# Patient Record
Sex: FEMALE | Race: WHITE | Employment: OTHER | URBAN - METROPOLITAN AREA
[De-identification: names, ages, dates, MRNs, and addresses within clinical notes are randomized per-mention and may not be internally consistent; named-entity substitution may affect disease eponyms.]

---

## 2015-11-25 LAB — PAP SMEAR - HIM PATIENT REPORTED: NEGATIVE

## 2017-01-18 ENCOUNTER — TRANSFERRED RECORDS (OUTPATIENT)
Dept: HEALTH INFORMATION MANAGEMENT | Facility: CLINIC | Age: 62
End: 2017-01-18

## 2017-01-18 LAB — PAP SMEAR - HIM PATIENT REPORTED: NEGATIVE

## 2017-12-08 ENCOUNTER — TRANSFERRED RECORDS (OUTPATIENT)
Dept: HEALTH INFORMATION MANAGEMENT | Facility: CLINIC | Age: 62
End: 2017-12-08

## 2018-04-16 ENCOUNTER — TRANSFERRED RECORDS (OUTPATIENT)
Dept: HEALTH INFORMATION MANAGEMENT | Facility: CLINIC | Age: 63
End: 2018-04-16

## 2018-04-16 LAB — PAP SMEAR - HIM PATIENT REPORTED: NEGATIVE

## 2018-06-18 ENCOUNTER — TRANSFERRED RECORDS (OUTPATIENT)
Dept: HEALTH INFORMATION MANAGEMENT | Facility: CLINIC | Age: 63
End: 2018-06-18

## 2018-07-23 ENCOUNTER — OFFICE VISIT (OUTPATIENT)
Dept: PEDIATRICS | Facility: CLINIC | Age: 63
End: 2018-07-23
Payer: COMMERCIAL

## 2018-07-23 VITALS
SYSTOLIC BLOOD PRESSURE: 124 MMHG | WEIGHT: 175 LBS | HEIGHT: 63 IN | BODY MASS INDEX: 31.01 KG/M2 | TEMPERATURE: 98.4 F | HEART RATE: 61 BPM | OXYGEN SATURATION: 97 % | DIASTOLIC BLOOD PRESSURE: 86 MMHG

## 2018-07-23 DIAGNOSIS — M54.41 ACUTE MIDLINE LOW BACK PAIN WITH RIGHT-SIDED SCIATICA: Primary | ICD-10-CM

## 2018-07-23 PROCEDURE — 99213 OFFICE O/P EST LOW 20 MIN: CPT | Performed by: PHYSICIAN ASSISTANT

## 2018-07-23 RX ORDER — CYCLOBENZAPRINE HCL 10 MG
10 TABLET ORAL 3 TIMES DAILY PRN
Qty: 15 TABLET | Refills: 0 | Status: SHIPPED | OUTPATIENT
Start: 2018-07-23 | End: 2019-02-18

## 2018-07-23 NOTE — PATIENT INSTRUCTIONS
Low Back Pain            What is low back pain?   Low back pain is pain and stiffness in the lower back. It is one of the most common reasons people miss work.   How does it occur?   Your lower back is called your lumbar spine. It is made up of 5 bones called lumbar vertebrae. In between the vertebrae are shock absorbers called disks. Back pain can occur from an injury to the vertebrae or when a disk bulges or herniates.   Low back pain is usually caused when a ligament or muscle holding a vertebra in its proper position is strained. Vertebrae are bones that make up the spinal column through which the spinal cord passes. When these muscles or ligaments become weak or strained, the spine loses its stability, resulting in pain.   Low back pain can occur if your job involves lifting and carrying heavy objects, or if you spend a lot of time sitting or standing in one position or bending over. It can be caused by a fall or by unusually strenuous exercise. It can be brought on by the tension and stress that cause headaches in some people. It can even be brought on by violent sneezing or coughing.   People who are overweight may have low back pain because of the added stress on their back.   Back pain may occur when the muscles, joints, bones, and connective tissues of the back become inflamed as a result of an infection or an immune system problem. Arthritic disorders as well as some congenital and degenerative conditions may cause back pain.   Back pain accompanied by loss of bladder or bowel control, trouble moving your legs, or numbness or tingling in your arms or legs requires immediate medical treatment.   What are the symptoms?   Symptoms include:   pain in the back or legs   stiffness, spasm, or limited motion   The pain may be constant or may happen only in certain positions. It may get worse when you cough, sneeze, bend, twist, or strain during a bowel movement. The pain may be in only one spot or may  spread to other areas, most commonly down the buttocks and into the back of the thigh.   A low back strain typically does not produce pain past the knee into the calf or foot. Tingling or numbness in the calf or foot may indicate a herniated disk or pinched nerve.   Be sure to see your healthcare provider if:   You have weakness in your leg, especially if you cannot lift your foot, because this may be a sign of nerve damage.   You have new bowel or bladder problems as well as back pain, which may be a sign of severe injury to your spinal cord.   You have pain that gets worse despite treatment.   How is it diagnosed?   Your healthcare provider will review your medical history and examine you. You may have X-rays, an MRI, CT scan, or a bone scan.   How is it treated?   To treat this condition:   Put an ice pack, gel pack, or package of frozen vegetables, wrapped in a cloth on the area every 3 to 4 hours, for up to 20 minutes at a time for the first 2 or 3 days.   Use a heating pad or hot water bottle. Don't let the heating pad get too hot, and don't fall asleep with it. You could get a burn.   Rest in bed on a firm mattress. Often it helps to lie on your back with your knees raised on a pillow. However, some people prefer to lie on their side with their knees bent. It's best to try to stay active, so try not to rest in bed longer than 1 to 2 days.   Take muscle relaxants as recommended by your healthcare provider.   Take an anti-inflammatory such as ibuprofen, or other medicine as directed by your provider. Nonsteroidal anti-inflammatory medicines (NSAIDs) may cause stomach bleeding and other problems. These risks increase with age. Read the label and take as directed. Unless recommended by your healthcare provider, do not take for more than 10 days.   Get a back massage by a trained person.   Wear a belt or corset to support your back.   Do the exercises recommended by your provider. Your provider may also prescribe  physical therapy.   Talk with a counselor, if your back pain is related to tension caused by emotional problems.   When the pain is gone, ask your healthcare provider about starting an exercise program such as the following:   Exercise moderately every day, using stretching and warm-up exercises suggested by your provider or physical therapist.   Exercise vigorously for about 30 minutes 3 times a week by walking, swimming, using a stationary bicycle, or doing low-impact aerobics.   Exercising regularly will not only help your back, it will also help keep you healthier overall.   How long will the effects last?   The effects of back pain last as long as the cause exists or until your body recovers from the strain, usually a day or two but sometimes weeks.   How can I take care of myself?   In addition to the treatment described above, keep in mind these suggestions:   Practice good posture. Stand with your head up, shoulders straight, chest forward, weight balanced evenly on both feet, and pelvis tucked in.   Lose weight if you are overweight   Keep your core muscles strong. These are your abdominal and back muscles.   Sleep without a pillow under your head.   Pain is the best way to  the pace you should set in increasing your activity and exercise. Minor discomfort, stiffness, soreness, and mild aches need not interfere with activity. However, limit your activities temporarily if:   Your symptoms return.   The pain increases when you are more active.   The pain increases within 24 hours after a new or higher level of activity.   When can I return to my normal activities?   Everyone recovers from an injury at a different rate. Return to your activities depends on how soon your back recovers, not by how many days or weeks it has been since your injury has occurred. In general, the longer you have symptoms before you start treatment, the longer it will take to get better. The goal is to return to your normal  activities as soon as is safely possible. If you return too soon you may worsen your injury.   It is important that you have fully recovered from your low back pain before you return to any strenuous activity. You must be able to have the same range of motion that you had before your injury. You must be able to walk and twist without pain.   What can I do to help prevent low back pain?   You can reduce the strain on your back by doing the following:   Don't push with your arms when you move a heavy object. Turn around and push backwards so the strain is taken by your legs.   Whenever you sit, sit in a straight-backed chair and hold your spine against the back of the chair.   Bend your knees and hips and keep your back straight when you lift a heavy object.   Avoid lifting heavy objects higher than your waist.   Hold packages you carry close to your body, with your arms bent.   Use a footrest for one foot when you stand or sit in one spot for a long time. This keeps your back straight.   Bend your knees when you bend over.   Sit close to the pedals when you drive and use your seat belt and a hard backrest or pillow.   Lie on your side with your knees bent when you sleep or rest. It may help to put a pillow between your knees.   Put a pillow under your knees when you sleep on your back.   Raise the foot of the bed 8 inches to discourage sleeping on your stomach unless you have other problems that require that you keep your head elevated.   To rest your back, hold each of these positions for 5?minutes or longer:   Lie on your back, bend your knees, and put pillows under your knees.   Lie on your back on the floor with a pillow under your neck. Bend your knees to a 90-degree angle, and put your lower legs and feet on a chair.   Lie on your back, bend your knees, and bring one knee up to your chest and hold it there. Repeat with the other knee, then bring both knees to your chest. When holding your knee to your chest,  grab your thigh rather than your lower leg to avoid over flexing your knee.     Published by [employer].  This content is reviewed periodically and is subject to change as new health information becomes available. The information is intended to inform and educate and is not a replacement for medical evaluation, advice, diagnosis or treatment by a healthcare professional.   Developed by Yolanda Alexis RN, [place], and [facility].   ? 2010 [facility] and/or its affiliates. All Rights Reserved.           Low Back Pain Exercise          Standing hamstring stretch: Put the heel of one leg on a stool about 15 inches high. Keep your leg straight. Lean forward, bending at the hips until you feel a mild stretch in the back of your thigh. Make sure you do not roll your shoulders or bend at the waist when doing this. You want to stretch your leg, not your lower back. Hold the stretch for 15 to 30 seconds. Repeat with each leg 3 times.   Cat and camel: Get down on your hands and knees. Let your stomach sag, allowing your back to curve downward. Hold this position for 5 seconds. Then arch your back and hold for 5 seconds. Do 3 sets of 10.   Quadruped arm and leg raise: Get down on your hands and knees. Pull in your belly button and tighten your abdominal muscles to stiffen your spine. While keeping your abdominals tight, raise one arm and the opposite leg away from you. Hold this position for 5 seconds. Lower your arm and leg slowly and change sides. Do this 10 times on each side.   Pelvic tilt: Lie on your back with your knees bent and your feet flat on the floor. Tighten your abdominal muscles and push your lower back into the floor. Hold this position for 5 seconds, then relax. Do 3 sets of 10.   Partial curl: Lie on your back with your knees bent and your feet flat on the floor. Tighten your stomach muscles. Tuck your chin to your chest. With your hands stretched out in front of you, curl your upper body forward until your  shoulders clear the floor. Hold this position for 3 seconds. Don't hold your breath. It helps to breathe out as you lift your shoulders up. Relax back to the floor. Repeat 10 times. Build to 3 sets of 10. To challenge yourself, clasp your hands behind your head and keep your elbows out to the side.   Gluteal stretch: Lie on your back with both knees bent. Rest the ankle of one leg over the knee of your other leg. Grasp the thigh of the bottom leg and pull toward your chest. You will feel a stretch along the buttocks and possibly along the outside of your hip. Hold the stretch for 15 to 30 seconds. Repeat 3 times with each leg.   Extension exercise:   0. Lie face down on the floor for 5 minutes. If this hurts too much, lie face down with a pillow under your stomach. This should relieve your leg or back pain. When you can lie on your stomach for 5 minutes without a pillow, you can continue with Part B of this exercise.   0. After lying on your stomach for 5 minutes, prop yourself up on your elbows for another 5 minutes. If you can do this without having more leg or buttock pain, you can start doing part C of this exercise.   0. Lie on your stomach with your hands under your shoulders. Then press down on your hands and extend your elbows while keeping your hips flat on the floor. Hold for 1 second and lower yourself to the floor. Do 3 to 5 sets of 10 repetitions. Rest for 1 minute between sets. You should have no pain in your legs when you do this, but it is normal to feel some pain in your lower back.   Do this exercise several times a day.   Side plank: Lie on your side with your legs, hips, and shoulders in a straight line. Prop yourself up onto your forearm so your elbow is directly under your shoulder. Lift your hips off the floor and balance on your forearm and the outside of your foot. Try to hold this position for 15 seconds, then slowly lower your hip to the ground. Switch sides and repeat. Work up to holding  for 1 minute or longer. This exercise can be made easier by starting with your knees and hips flexed toward your chest.   Published by [employer].  This content is reviewed periodically and is subject to change as new health information becomes available. The information is intended to inform and educate and is not a replacement for medical evaluation, advice, diagnosis or treatment by a healthcare professional.   Written by Romana Augustin, MS, PT, and Yolanda Bui PT, [facility], [facility], for [facility]   ? 2010 [facility] and/or its affiliates. All Rights Reserved.         Copyright   [employer] 2011

## 2018-07-23 NOTE — PROGRESS NOTES
"  SUBJECTIVE:   Marivel Hernandez is a 63 year old female who presents to clinic today for the following health issues:    Back Pain     Duration: 1 day ago        Specific cause: turning/bending    Description:   Location of pain: low back right  Character of pain: sharp, dull ache and constant  Pain radiation:radiates into the right leg  New numbness or weakness in legs, not attributed to pain:  no     Intensity: Currently 5/10, At its worst 9/10    History:   Pain interferes with job: YES  History of back problems: no prior back problems  Any previous MRI or X-rays: None  Sees a specialist for back pain:  No  Therapies tried without relief: ibuprofen, stretching and cold    Alleviating factors:   Improved by: rest      Precipitating factors:  Worsened by: any type of movement    Accompanying Signs & Symptoms:  Risk of Fracture:  None  Risk of Cauda Equina:  None  Risk of Infection:  None  Risk of Cancer:  None  Risk of Ankylosing Spondylitis:  Onset at age <35, male, AND morning back stiffness. no     ROS:  ROS otherwise negative    OBJECTIVE:                                                    /86 (BP Location: Right arm, Cuff Size: Adult Regular)  Pulse 61  Temp 98.4  F (36.9  C) (Oral)  Ht 5' 3\" (1.6 m)  Wt 175 lb (79.4 kg)  SpO2 97%  BMI 31 kg/m2  Body mass index is 31 kg/(m^2).   GENERAL: healthy, alert, well nourished, well hydrated, no distress  Lumber/Thoracic Spine Exam: Tender:  lumbar spinous processes, right para lumbar muscles  Range of Motion:  lumbar flexion  decreased, painful, left lateral lumbar bending  decreased, painful, right lateral lumbar bending  full  Strength: full  Special tests:  Unable to test SLR due to pain    Diagnostic test results:  No results found for this or any previous visit (from the past 24 hour(s)).     ASSESSMENT/PLAN:                                                    (M54.41) Acute midline low back pain with right-sided sciatica  (primary encounter " diagnosis)  Comment: begin heat, NSAIDs and flexeril PRN. Gentle stretching.   Plan: cyclobenzaprine (FLEXERIL) 10 MG tablet          See Patient Instructions    Arabella Butcher PA-C  [facility]

## 2018-07-23 NOTE — MR AVS SNAPSHOT
After Visit Summary   7/23/2018    Marivel Hernandez    MRN: [id number]           Patient Information     Date Of Birth          1955        Visit Information        Provider Department      7/23/2018 10:50 AM Arabella Butcher PA-C [facility]        Today's Diagnoses     Acute midline low back pain with right-sided sciatica    -  1      Care Instructions             Low Back Pain            What is low back pain?   Low back pain is pain and stiffness in the lower back. It is one of the most common reasons people miss work.   How does it occur?   Your lower back is called your lumbar spine. It is made up of 5 bones called lumbar vertebrae. In between the vertebrae are shock absorbers called disks. Back pain can occur from an injury to the vertebrae or when a disk bulges or herniates.   Low back pain is usually caused when a ligament or muscle holding a vertebra in its proper position is strained. Vertebrae are bones that make up the spinal column through which the spinal cord passes. When these muscles or ligaments become weak or strained, the spine loses its stability, resulting in pain.   Low back pain can occur if your job involves lifting and carrying heavy objects, or if you spend a lot of time sitting or standing in one position or bending over. It can be caused by a fall or by unusually strenuous exercise. It can be brought on by the tension and stress that cause headaches in some people. It can even be brought on by violent sneezing or coughing.   People who are overweight may have low back pain because of the added stress on their back.   Back pain may occur when the muscles, joints, bones, and connective tissues of the back become inflamed as a result of an infection or an immune system problem. Arthritic disorders as well as some congenital and degenerative conditions may cause back pain.   Back pain accompanied by loss of bladder or bowel control, trouble moving  your legs, or numbness or tingling in your arms or legs requires immediate medical treatment.   What are the symptoms?   Symptoms include:   pain in the back or legs   stiffness, spasm, or limited motion   The pain may be constant or may happen only in certain positions. It may get worse when you cough, sneeze, bend, twist, or strain during a bowel movement. The pain may be in only one spot or may spread to other areas, most commonly down the buttocks and into the back of the thigh.   A low back strain typically does not produce pain past the knee into the calf or foot. Tingling or numbness in the calf or foot may indicate a herniated disk or pinched nerve.   Be sure to see your healthcare provider if:   You have weakness in your leg, especially if you cannot lift your foot, because this may be a sign of nerve damage.   You have new bowel or bladder problems as well as back pain, which may be a sign of severe injury to your spinal cord.   You have pain that gets worse despite treatment.   How is it diagnosed?   Your healthcare provider will review your medical history and examine you. You may have X-rays, an MRI, CT scan, or a bone scan.   How is it treated?   To treat this condition:   Put an ice pack, gel pack, or package of frozen vegetables, wrapped in a cloth on the area every 3 to 4 hours, for up to 20 minutes at a time for the first 2 or 3 days.   Use a heating pad or hot water bottle. Don't let the heating pad get too hot, and don't fall asleep with it. You could get a burn.   Rest in bed on a firm mattress. Often it helps to lie on your back with your knees raised on a pillow. However, some people prefer to lie on their side with their knees bent. It's best to try to stay active, so try not to rest in bed longer than 1 to 2 days.   Take muscle relaxants as recommended by your healthcare provider.   Take an anti-inflammatory such as ibuprofen, or other medicine as directed by your provider. Nonsteroidal  anti-inflammatory medicines (NSAIDs) may cause stomach bleeding and other problems. These risks increase with age. Read the label and take as directed. Unless recommended by your healthcare provider, do not take for more than 10 days.   Get a back massage by a trained person.   Wear a belt or corset to support your back.   Do the exercises recommended by your provider. Your provider may also prescribe physical therapy.   Talk with a counselor, if your back pain is related to tension caused by emotional problems.   When the pain is gone, ask your healthcare provider about starting an exercise program such as the following:   Exercise moderately every day, using stretching and warm-up exercises suggested by your provider or physical therapist.   Exercise vigorously for about 30 minutes 3 times a week by walking, swimming, using a stationary bicycle, or doing low-impact aerobics.   Exercising regularly will not only help your back, it will also help keep you healthier overall.   How long will the effects last?   The effects of back pain last as long as the cause exists or until your body recovers from the strain, usually a day or two but sometimes weeks.   How can I take care of myself?   In addition to the treatment described above, keep in mind these suggestions:   Practice good posture. Stand with your head up, shoulders straight, chest forward, weight balanced evenly on both feet, and pelvis tucked in.   Lose weight if you are overweight   Keep your core muscles strong. These are your abdominal and back muscles.   Sleep without a pillow under your head.   Pain is the best way to  the pace you should set in increasing your activity and exercise. Minor discomfort, stiffness, soreness, and mild aches need not interfere with activity. However, limit your activities temporarily if:   Your symptoms return.   The pain increases when you are more active.   The pain increases within 24 hours after a new or higher level  of activity.   When can I return to my normal activities?   Everyone recovers from an injury at a different rate. Return to your activities depends on how soon your back recovers, not by how many days or weeks it has been since your injury has occurred. In general, the longer you have symptoms before you start treatment, the longer it will take to get better. The goal is to return to your normal activities as soon as is safely possible. If you return too soon you may worsen your injury.   It is important that you have fully recovered from your low back pain before you return to any strenuous activity. You must be able to have the same range of motion that you had before your injury. You must be able to walk and twist without pain.   What can I do to help prevent low back pain?   You can reduce the strain on your back by doing the following:   Don't push with your arms when you move a heavy object. Turn around and push backwards so the strain is taken by your legs.   Whenever you sit, sit in a straight-backed chair and hold your spine against the back of the chair.   Bend your knees and hips and keep your back straight when you lift a heavy object.   Avoid lifting heavy objects higher than your waist.   Hold packages you carry close to your body, with your arms bent.   Use a footrest for one foot when you stand or sit in one spot for a long time. This keeps your back straight.   Bend your knees when you bend over.   Sit close to the pedals when you drive and use your seat belt and a hard backrest or pillow.   Lie on your side with your knees bent when you sleep or rest. It may help to put a pillow between your knees.   Put a pillow under your knees when you sleep on your back.   Raise the foot of the bed 8 inches to discourage sleeping on your stomach unless you have other problems that require that you keep your head elevated.   To rest your back, hold each of these positions for 5?minutes or longer:   Lie on your  back, bend your knees, and put pillows under your knees.   Lie on your back on the floor with a pillow under your neck. Bend your knees to a 90-degree angle, and put your lower legs and feet on a chair.   Lie on your back, bend your knees, and bring one knee up to your chest and hold it there. Repeat with the other knee, then bring both knees to your chest. When holding your knee to your chest, grab your thigh rather than your lower leg to avoid over flexing your knee.     Published by [employer].  This content is reviewed periodically and is subject to change as new health information becomes available. The information is intended to inform and educate and is not a replacement for medical evaluation, advice, diagnosis or treatment by a healthcare professional.   Developed by Yolanda Alexis RN, [place], and [employer].   ? 2010 [facility] and/or its affiliates. All Rights Reserved.           Low Back Pain Exercise          Standing hamstring stretch: Put the heel of one leg on a stool about 15 inches high. Keep your leg straight. Lean forward, bending at the hips until you feel a mild stretch in the back of your thigh. Make sure you do not roll your shoulders or bend at the waist when doing this. You want to stretch your leg, not your lower back. Hold the stretch for 15 to 30 seconds. Repeat with each leg 3 times.   Cat and camel: Get down on your hands and knees. Let your stomach sag, allowing your back to curve downward. Hold this position for 5 seconds. Then arch your back and hold for 5 seconds. Do 3 sets of 10.   Quadruped arm and leg raise: Get down on your hands and knees. Pull in your belly button and tighten your abdominal muscles to stiffen your spine. While keeping your abdominals tight, raise one arm and the opposite leg away from you. Hold this position for 5 seconds. Lower your arm and leg slowly and change sides. Do this 10 times on each side.   Pelvic tilt: Lie on your back with your knees bent and  your feet flat on the floor. Tighten your abdominal muscles and push your lower back into the floor. Hold this position for 5 seconds, then relax. Do 3 sets of 10.   Partial curl: Lie on your back with your knees bent and your feet flat on the floor. Tighten your stomach muscles. Tuck your chin to your chest. With your hands stretched out in front of you, curl your upper body forward until your shoulders clear the floor. Hold this position for 3 seconds. Don't hold your breath. It helps to breathe out as you lift your shoulders up. Relax back to the floor. Repeat 10 times. Build to 3 sets of 10. To challenge yourself, clasp your hands behind your head and keep your elbows out to the side.   Gluteal stretch: Lie on your back with both knees bent. Rest the ankle of one leg over the knee of your other leg. Grasp the thigh of the bottom leg and pull toward your chest. You will feel a stretch along the buttocks and possibly along the outside of your hip. Hold the stretch for 15 to 30 seconds. Repeat 3 times with each leg.   Extension exercise:   0. Lie face down on the floor for 5 minutes. If this hurts too much, lie face down with a pillow under your stomach. This should relieve your leg or back pain. When you can lie on your stomach for 5 minutes without a pillow, you can continue with Part B of this exercise.   0. After lying on your stomach for 5 minutes, prop yourself up on your elbows for another 5 minutes. If you can do this without having more leg or buttock pain, you can start doing part C of this exercise.   0. Lie on your stomach with your hands under your shoulders. Then press down on your hands and extend your elbows while keeping your hips flat on the floor. Hold for 1 second and lower yourself to the floor. Do 3 to 5 sets of 10 repetitions. Rest for 1 minute between sets. You should have no pain in your legs when you do this, but it is normal to feel some pain in your lower back.   Do this exercise several  times a day.   Side plank: Lie on your side with your legs, hips, and shoulders in a straight line. Prop yourself up onto your forearm so your elbow is directly under your shoulder. Lift your hips off the floor and balance on your forearm and the outside of your foot. Try to hold this position for 15 seconds, then slowly lower your hip to the ground. Switch sides and repeat. Work up to holding for 1 minute or longer. This exercise can be made easier by starting with your knees and hips flexed toward your chest.   Published by [employer].  This content is reviewed periodically and is subject to change as new health information becomes available. The information is intended to inform and educate and is not a replacement for medical evaluation, advice, diagnosis or treatment by a healthcare professional.   Written by Romana Augustin MS, PT, and Yolanda Bui PT, [facility], \[facility] [place] [facility]\"", for [facility]   ? 2010 [facility] and/or its affiliates. All Rights Reserved.         Copyright   [employer] 2011                      Follow-ups after your visit        Who to contact     If you have questions or need follow up information about today's clinic visit or your schedule please contact [facility] directly at [phone].  Normal or non-critical lab and imaging results will be communicated to you by MyChart, letter or phone within 4 business days after the clinic has received the results. If you do not hear from us within 7 days, please contact the clinic through MyChart or phone. If you have a critical or abnormal lab result, we will notify you by phone as soon as possible.  Submit refill requests through [employer] or call your pharmacy and they will forward the refill request to us. Please allow 3 business days for your refill to be completed.          Additional Information About Your Visit        [employer] Information     [employer] lets you send messages to your doctor, view your test results, renew your  "prescriptions, schedule appointments and more. To sign up, go to [email or web address] . Click on \"Log in\" on the left side of the screen, which will take you to the Welcome page. Then click on \"Sign up Now\" on the right side of the page.     You will be asked to enter the access code listed below, as well as some personal information. Please follow the directions to create your username and password.     Your access code is: [email or web address]  Expires: 10/21/2018 11:13 AM     Your access code will  in 90 days. If you need help or a new code, please call your [place] clinic or [phone].        Care EveryWhere ID     This is your Care EveryWhere ID. This could be used by other organizations to access your [place] medical records  [id number]        Your Vitals Were     Pulse Temperature Height Pulse Oximetry BMI (Body Mass Index)       61 98.4  F (36.9  C) (Oral) 5' 3\" (1.6 m) 97% 31 kg/m2        Blood Pressure from Last 3 Encounters:   18 124/86   16 136/86   14 122/72    Weight from Last 3 Encounters:   18 175 lb (79.4 kg)   16 166 lb (75.3 kg)   14 162 lb 11.2 oz (73.8 kg)              Today, you had the following     No orders found for display         Today's Medication Changes          These changes are accurate as of 18 11:13 AM.  If you have any questions, ask your nurse or doctor.               Start taking these medicines.        Dose/Directions    cyclobenzaprine 10 MG tablet   Commonly known as:  FLEXERIL   Used for:  Acute midline low back pain with right-sided sciatica   Started by:  Arabella Butcher PA-C        Dose:  10 mg   Take 1 tablet (10 mg) by mouth 3 times daily as needed   Quantity:  15 tablet   Refills:  0            Where to get your medicines      These medications were sent to [id number]  [place], [place] - [address] [id number] [facility], [place] [address]     Phone:  [phone]     cyclobenzaprine 10 MG tablet "                Primary Care Provider Office Phone # Fax #    Consuelo Pimentel [phone] [address] [place] [address]        Equal Access to Services     HAILEE SIERRA : Hadelaine joe curtis lexijane Qi, walavonneda luqadaha, qaybta kamarcusda laverne, herminia castaneda trentonreese pedraza lagennymorgan liang. So [facility] [phone].    ATENCIÓN: Si habla español, tiene a del toro disposición servicios gratuitos de asistencia lingüística. Llame al [phone].    We comply with applicable federal civil rights laws and [place] laws. We do not discriminate on the basis of race, color, national origin, age, disability, sex, sexual orientation, or gender identity.            Thank you!     Thank you for choosing [facility]  for your care. Our goal is always to provide you with excellent care. Hearing back from our patients is one way we can continue to improve our services. Please take a few minutes to complete the written survey that you may receive in the mail after your visit with us. Thank you!             Your Updated Medication List - Protect others around you: Learn how to safely use, store and throw away your medicines at www.disposemymeds.org.          This list is accurate as of 7/23/18 11:13 AM.  Always use your most recent med list.                   Brand Name Dispense Instructions for use Diagnosis    calcium carbonate 500 MG tablet    OS-AMBER 500 mg [place]. Ca     Take 500 mg by mouth 2 times daily        cyclobenzaprine 10 MG tablet    FLEXERIL    15 tablet    Take 1 tablet (10 mg) by mouth 3 times daily as needed    Acute midline low back pain with right-sided sciatica       Multi-vitamin Tabs tablet      Take 1 tablet by mouth daily        SUMAtriptan 100 MG tablet    IMITREX    18 tablet    Take 1 tablet (100 mg) by mouth at onset of headache for migraine May repeat in 2 hours if needed: max 2/day    Migraine without aura and without status migrainosus, not intractable

## 2018-11-12 ENCOUNTER — TELEPHONE (OUTPATIENT)
Dept: PEDIATRICS | Facility: CLINIC | Age: 63
End: 2018-11-12

## 2018-11-12 NOTE — TELEPHONE ENCOUNTER
Panel Management Review          Composite cancer screening  Chart review shows that this patient is due/due soon for the following Mammogram and Colonoscopy  Summary:    Patient is due/failing the following:   COLONOSCOPY and MAMMOGRAM    Action needed:   Patient needs referral/order: mammogram/colonoscopy    Type of outreach:    Sent letter.    Questions for provider review:    None                                                                                                                                    Michaela Arzate CMA(AAMA)

## 2018-11-12 NOTE — LETTER
November 12, 2018      Marivel Hernandez  [id number]  [place] [address]        Dear Marivel,       We care about your health and have reviewed your health plan including your medical conditions, medications, and lab results.  Based on this review, it is recommended that you follow up regarding the following health topic(s):  -Breast Cancer Screening  -Colon Cancer Screening    We recommend you take the following action(s):  -schedule a MAMMOGRAM which is due. Please disregard this reminder if you have had this exam elsewhere within the last 1-2 years please let us know so we can update your records.  -schedule a COLONOSCOPY to look for colon cancer (due every 10 years or 5 years in higher risk situations.)  Colonoscopies can prevent 90-95% of colon cancer deaths.  Problem lesions can be removed before they ever become cancer.  If you do not wish to do a colonoscopy or cannot afford to do one at this time, there is another option called a Fecal Immunochemical Occult Blood Test (FIT) a take home stool sample kit.  It does not replace the colonoscopy for colorectal cancer screening, but it can detect hidden bleeding in the lower colon.  It does need to be repeated every year and if a positive result is obtained, you would be referred for a colonoscopy.  If you have completed either one of these tests at another facility, please have the records sent to our clinic for our records.     Please call us at the [facility] - [phone] (or use [place]) to address the above recommendations.     Thank you for trusting [facility] and we appreciate the opportunity to serve you.  We look forward to supporting your healthcare needs in the future.    Healthy Regards,    Your Health Care Team  [facility]

## 2019-02-18 ENCOUNTER — OFFICE VISIT (OUTPATIENT)
Dept: PEDIATRICS | Facility: CLINIC | Age: 64
End: 2019-02-18
Payer: COMMERCIAL

## 2019-02-18 VITALS
HEIGHT: 64 IN | TEMPERATURE: 98.3 F | HEART RATE: 68 BPM | SYSTOLIC BLOOD PRESSURE: 122 MMHG | OXYGEN SATURATION: 98 % | WEIGHT: 177.2 LBS | BODY MASS INDEX: 30.25 KG/M2 | DIASTOLIC BLOOD PRESSURE: 70 MMHG

## 2019-02-18 DIAGNOSIS — Z00.00 ROUTINE GENERAL MEDICAL EXAMINATION AT A HEALTH CARE FACILITY: Primary | ICD-10-CM

## 2019-02-18 DIAGNOSIS — G43.009 MIGRAINE WITHOUT AURA AND WITHOUT STATUS MIGRAINOSUS, NOT INTRACTABLE: ICD-10-CM

## 2019-02-18 DIAGNOSIS — Z13.1 SCREENING FOR DIABETES MELLITUS: ICD-10-CM

## 2019-02-18 DIAGNOSIS — Z13.220 SCREENING CHOLESTEROL LEVEL: ICD-10-CM

## 2019-02-18 DIAGNOSIS — E66.811 CLASS 1 OBESITY DUE TO EXCESS CALORIES WITHOUT SERIOUS COMORBIDITY WITH BODY MASS INDEX (BMI) OF 30.0 TO 30.9 IN ADULT: ICD-10-CM

## 2019-02-18 DIAGNOSIS — E66.09 CLASS 1 OBESITY DUE TO EXCESS CALORIES WITHOUT SERIOUS COMORBIDITY WITH BODY MASS INDEX (BMI) OF 30.0 TO 30.9 IN ADULT: ICD-10-CM

## 2019-02-18 DIAGNOSIS — Z12.31 VISIT FOR SCREENING MAMMOGRAM: ICD-10-CM

## 2019-02-18 DIAGNOSIS — Z11.4 SCREENING FOR HIV (HUMAN IMMUNODEFICIENCY VIRUS): ICD-10-CM

## 2019-02-18 DIAGNOSIS — Z11.59 NEED FOR HEPATITIS C SCREENING TEST: ICD-10-CM

## 2019-02-18 LAB
ALBUMIN SERPL-MCNC: 4 G/DL (ref 3.4–5)
ALP SERPL-CCNC: 117 U/L (ref 40–150)
ALT SERPL W P-5'-P-CCNC: 22 U/L (ref 0–50)
ANION GAP SERPL CALCULATED.3IONS-SCNC: 4 MMOL/L (ref 3–14)
AST SERPL W P-5'-P-CCNC: 19 U/L (ref 0–45)
BILIRUB SERPL-MCNC: 0.4 MG/DL (ref 0.2–1.3)
BUN SERPL-MCNC: 21 MG/DL (ref 7–30)
CALCIUM SERPL-MCNC: 9.4 MG/DL (ref 8.5–10.1)
CHLORIDE SERPL-SCNC: 108 MMOL/L (ref 94–109)
CHOLEST SERPL-MCNC: 194 MG/DL
CO2 SERPL-SCNC: 28 MMOL/L (ref 20–32)
CREAT SERPL-MCNC: 0.97 MG/DL (ref 0.52–1.04)
GFR SERPL CREATININE-BSD FRML MDRD: 62 ML/MIN/{1.73_M2}
GLUCOSE SERPL-MCNC: 96 MG/DL (ref 70–99)
HCV AB SERPL QL IA: NONREACTIVE
HDLC SERPL-MCNC: 59 MG/DL
HIV 1+2 AB+HIV1 P24 AG SERPL QL IA: NONREACTIVE
LDLC SERPL CALC-MCNC: 104 MG/DL
NONHDLC SERPL-MCNC: 135 MG/DL
POTASSIUM SERPL-SCNC: 4 MMOL/L (ref 3.4–5.3)
PROT SERPL-MCNC: 7.5 G/DL (ref 6.8–8.8)
SODIUM SERPL-SCNC: 140 MMOL/L (ref 133–144)
TRIGL SERPL-MCNC: 155 MG/DL

## 2019-02-18 PROCEDURE — 80053 COMPREHEN METABOLIC PANEL: CPT | Performed by: INTERNAL MEDICINE

## 2019-02-18 PROCEDURE — 87389 HIV-1 AG W/HIV-1&-2 AB AG IA: CPT | Performed by: INTERNAL MEDICINE

## 2019-02-18 PROCEDURE — 99396 PREV VISIT EST AGE 40-64: CPT | Performed by: INTERNAL MEDICINE

## 2019-02-18 PROCEDURE — 80061 LIPID PANEL: CPT | Performed by: INTERNAL MEDICINE

## 2019-02-18 PROCEDURE — 86803 HEPATITIS C AB TEST: CPT | Performed by: INTERNAL MEDICINE

## 2019-02-18 PROCEDURE — 36415 COLL VENOUS BLD VENIPUNCTURE: CPT | Performed by: INTERNAL MEDICINE

## 2019-02-18 RX ORDER — SUMATRIPTAN 100 MG/1
TABLET, FILM COATED ORAL
Qty: 9 TABLET | Refills: 3 | Status: SHIPPED | OUTPATIENT
Start: 2019-02-18 | End: 2019-11-14

## 2019-02-18 ASSESSMENT — ENCOUNTER SYMPTOMS
NERVOUS/ANXIOUS: 0
EYE PAIN: 0
HEADACHES: 0
DYSURIA: 0
DIZZINESS: 0
SORE THROAT: 0
FEVER: 0
JOINT SWELLING: 0
HEARTBURN: 0
DIARRHEA: 0
CONSTIPATION: 0
MYALGIAS: 1
ABDOMINAL PAIN: 0
SHORTNESS OF BREATH: 0
PARESTHESIAS: 0
HEMATURIA: 0
CHILLS: 0
BREAST MASS: 0
NAUSEA: 0
COUGH: 0
PALPITATIONS: 0
FREQUENCY: 0
ARTHRALGIAS: 0
HEMATOCHEZIA: 0

## 2019-02-18 ASSESSMENT — MIFFLIN-ST. JEOR: SCORE: 1340.83

## 2019-02-18 NOTE — PATIENT INSTRUCTIONS
It was nice to see you in clinic.    Please stop at the lab on your way out of clinic. I will be in touch with your results.    Mammogram should call you to schedule.    If you are over 50 I recommend the new Shingrix vaccine. Two doses of Shingrix provides more than 90% protection against shingles and postherpetic neuralgia (PHN), a type of chronic pain that is the most common complication of shingles.   - even if you've had the older shingles vaccine (Zostavax) it's okay to get the new vaccine.   - even if you've had singles before the vaccine can be helpful.    Typically the best (and cheapest!) place to get this vaccine is our pharmacy downstairs. You do not need an appointment and can walk in any time they are open. The vaccine is a two shot series - I recommend getting the second shot 2-6 months after the first dose.    You may have a sore arm and feel mild flu-like symptoms for a day or two after the vaccine. Most people do not need to adjust their regular activities. It's okay to take tylenol or ibuprofen if you have side effects.       Preventive Health Recommendations  Female Ages 50 - 64    Yearly exam: See your health care provider every year in order to  o Review health changes.   o Discuss preventive care.    o Review your medicines if your doctor has prescribed any.      Get a Pap test every three years (unless you have an abnormal result and your provider advises testing more often).    If you get Pap tests with HPV test, you only need to test every 5 years, unless you have an abnormal result.     You do not need a Pap test if your uterus was removed (hysterectomy) and you have not had cancer.    You should be tested each year for STDs (sexually transmitted diseases) if you're at risk.     Have a mammogram every 1 to 2 years.    Have a colonoscopy at age 50, or have a yearly FIT test (stool test). These exams screen for colon cancer.      Have a cholesterol test every 5 years, or more often if  advised.    Have a diabetes test (fasting glucose) every three years. If you are at risk for diabetes, you should have this test more often.     If you are at risk for osteoporosis (brittle bone disease), think about having a bone density scan (DEXA).    Shots: Get a flu shot each year. Get a tetanus shot every 10 years.    Nutrition:     Eat at least 5 servings of fruits and vegetables each day.    Eat whole-grain bread, whole-wheat pasta and brown rice instead of white grains and rice.    Get adequate Calcium and Vitamin D.     Lifestyle    Exercise at least 150 minutes a week (30 minutes a day, 5 days a week). This will help you control your weight and prevent disease.    Limit alcohol to one drink per day.    No smoking.     Wear sunscreen to prevent skin cancer.     See your dentist every six months for an exam and cleaning.    See your eye doctor every 1 to 2 years.

## 2019-02-18 NOTE — PROGRESS NOTES
SUBJECTIVE:   CC: Marivel Hernandez is an 63 year old woman who presents for preventive health visit.     Physical   Annual:     Getting at least 3 servings of Calcium per day:  Yes    Bi-annual eye exam:  Yes    Dental care twice a year:  Yes    Sleep apnea or symptoms of sleep apnea:  None    Diet:  Regular (no restrictions)    Frequency of exercise:  None    Taking medications regularly:  Yes    Medication side effects:  None    Additional concerns today:  No    PHQ-2 Total Score: 0    # Migraine  - goes in spurts, will go 3-4 months without one  - related to stress and positioning at the keyboard  - prescription usually lasts 6 months    # history of uterine prolapse  - partial hysterectomy   - sees OB-Gyn - Roel Valenzuela MD  - still doing pap smears    # Colonoscopy  - Did one within the last few years in [place].  - will get records    Today's PHQ-2 Score:   PHQ-2 ( 1999 Pfizer) 2/18/2019   Q1: Little interest or pleasure in doing things 0   Q2: Feeling down, depressed or hopeless 0   PHQ-2 Score 0   Q1: Little interest or pleasure in doing things Not at all   Q2: Feeling down, depressed or hopeless Not at all   PHQ-2 Score 0       Abuse: Current or Past(Physical, Sexual or Emotional)- No  Do you feel safe in your environment? Yes    Social History     Tobacco Use     Smoking status: Never Smoker     Smokeless tobacco: Never Used   Substance Use Topics     Alcohol use: Yes     Comment: occasional     Alcohol Use 2/18/2019   If you drink alcohol do you typically have greater than 3 drinks per day OR greater than 7 drinks per week? No     Reviewed orders with patient.  Reviewed health maintenance and updated orders accordingly - Yes  Patient Active Problem List   Diagnosis     Migraine     Sciatica     CARDIOVASCULAR SCREENING; LDL GOAL LESS THAN 160     Class 1 obesity due to excess calories without serious comorbidity with body mass index (BMI) of 30.0 to 30.9 in adult     Past Surgical History:   Procedure  "Laterality Date     C NONSPECIFIC PROCEDURE  1994    Tubal Ligation     HYSTERECTOMY, AMY  2006       Social History     Tobacco Use     Smoking status: Never Smoker     Smokeless tobacco: Never Used   Substance Use Topics     Alcohol use: Yes     Comment: occasional     Family History   Problem Relation Age of Onset     Hypertension Mother      Cancer Mother         colon at age 90     Heart Disease Father 60        aneurysm     Breast Cancer Sister 47     Hypertension Maternal Grandmother      Diabetes Maternal Aunt         all 6 aunts (Mother's sisters)         Current Outpatient Medications   Medication Sig Dispense Refill     calcium carbonate (OS-AMBER 500 MG [place]. CA) 500 MG tablet Take 500 mg by mouth 2 times daily       multivitamin, therapeutic with minerals (MULTI-VITAMIN) TABS Take 1 tablet by mouth daily       SUMAtriptan (IMITREX) 100 MG tablet TAKE ONE TABLET BY MOUTH AT ONSET OF HEADACHE FOR MIGRAINE. MAY REPEAT IN 2 HOURS IF NEEDED. MAX OF 2 PER DAY 9 tablet 3     Allergies   Allergen Reactions     No Known Allergies        Mammogram Screening: Patient over age 50, mutual decision to screen reflected in health maintenance.    Pertinent mammograms are reviewed under the imaging tab.  History of abnormal Pap smear: NO - age 30-65 PAP every 5 years with negative HPV co-testing recommended  -- had \"partial hysterectomy\" - will get OB records as she has been doing paps with them.     Reviewed and updated as needed this visit by clinical staff  Tobacco  Allergies  Meds  Enrico Hx         Reviewed and updated as needed this visit by Provider  Enrico Blanco        Past Medical History:   Diagnosis Date     Migraine, unspecified, without mention of intractable migraine without mention of status migrainosus      Sciatica     MRI 1999; L4L5 mild disc protrusion     Ulcerative colitis (H)       Past Surgical History:   Procedure Laterality Date     C NONSPECIFIC PROCEDURE  1994    Tubal Ligation     HYSTERECTOMY, AMY  " "2006       Review of Systems   Constitutional: Negative for chills and fever.   HENT: Negative for congestion, ear pain, hearing loss and sore throat.    Eyes: Negative for pain and visual disturbance.   Respiratory: Negative for cough and shortness of breath.    Cardiovascular: Negative for chest pain, palpitations and peripheral edema.   Gastrointestinal: Negative for abdominal pain, constipation, diarrhea, heartburn, hematochezia and nausea.   Breasts:  Negative for tenderness, breast mass and discharge.   Genitourinary: Negative for dysuria, frequency, genital sores, hematuria, pelvic pain, urgency, vaginal bleeding and vaginal discharge.   Musculoskeletal: Positive for myalgias. Negative for arthralgias and joint swelling.   Skin: Negative for rash.   Neurological: Negative for dizziness, headaches and paresthesias.   Psychiatric/Behavioral: Negative for mood changes. The patient is not nervous/anxious.        OBJECTIVE:   /70 (BP Location: Right arm, Patient Position: Sitting, Cuff Size: Adult Regular)   Pulse 68   Temp 98.3  F (36.8  C) (Tympanic)   Ht 1.613 m (5' 3.5\")   Wt 80.4 kg (177 lb 3.2 oz)   SpO2 98%   BMI 30.90 kg/m    Physical Exam  GENERAL: healthy, alert and no distress  EYES: Eyes grossly normal to inspection, PERRL and conjunctivae and sclerae normal  HENT: ear canals and TM's normal, nose and mouth without ulcers or lesions  NECK: no adenopathy, no asymmetry, masses, or scars and thyroid normal to palpation  RESP: lungs clear to auscultation - no rales, rhonchi or wheezes  CV: regular rate and rhythm, normal S1 S2, no S3 or S4, no murmur, click or rub, no peripheral edema and peripheral pulses strong  ABDOMEN: soft, nontender, no hepatosplenomegaly, no masses and bowel sounds normal  MS: no gross musculoskeletal defects noted, no edema  SKIN: no suspicious lesions or rashes  NEURO: Normal strength and tone, mentation intact and speech normal  PSYCH: mentation appears normal, affect " normal/bright    Diagnostic Test Results:  See lab results.    ASSESSMENT/PLAN:       ICD-10-CM    1. Routine general medical examination at a health care facility Z00.00    2. Class 1 obesity due to excess calories without serious comorbidity with body mass index (BMI) of 30.0 to 30.9 in adult E66.09 Comprehensive metabolic panel    Z68.30 Lipid panel reflex to direct LDL Fasting   3. Migraine without aura and without status migrainosus, not intractable G43.009 SUMAtriptan (IMITREX) 100 MG tablet   4. Visit for screening mammogram Z12.31 MA SCREENING DIGITAL BILAT - Future  (s+30)   5. Need for hepatitis C screening test Z11.59 Hepatitis C Screen Reflex to HCV RNA Quant and Genotype   6. Screening for HIV (human immunodeficiency virus) Z11.4 HIV Screening   7. Screening for diabetes mellitus Z13.1 Comprehensive metabolic panel   8. Screening cholesterol level Z13.220 Lipid panel reflex to direct LDL Fasting     Will get records from OB and GI.     Refilled imitrex.     She likes exercising with people but  has arthritis - will think about getting together with a girlfriend to walk.    --------  PATIENT INSTRUCTIONS    It was nice to see you in clinic.    Please stop at the lab on your way out of clinic. I will be in touch with your results.    Mammogram should call you to schedule.    If you are over 50 I recommend the new Shingrix vaccine. Two doses of Shingrix provides more than 90% protection against shingles and postherpetic neuralgia (PHN), a type of chronic pain that is the most common complication of shingles.   - even if you've had the older shingles vaccine (Zostavax) it's okay to get the new vaccine.   - even if you've had singles before the vaccine can be helpful.    Typically the best (and cheapest!) place to get this vaccine is our pharmacy downstairs. You do not need an appointment and can walk in any time they are open. The vaccine is a two shot series - I recommend getting the second shot 2-6  "months after the first dose.    You may have a sore arm and feel mild flu-like symptoms for a day or two after the vaccine. Most people do not need to adjust their regular activities. It's okay to take tylenol or ibuprofen if you have side effects.   -------------------    COUNSELING:  Reviewed preventive health counseling, as reflected in patient instructions       Regular exercise       Healthy diet/nutrition       Immunizations    Discussed shingles.       Colon cancer screening       Consider Hep C screening for patients born between 1945 and        HIV screeninx in teen years, 1x in adult years, and at intervals if high risk    BP Readings from Last 1 Encounters:   19 122/70     Estimated body mass index is 30.9 kg/m  as calculated from the following:    Height as of this encounter: 1.613 m (5' 3.5\").    Weight as of this encounter: 80.4 kg (177 lb 3.2 oz).           reports that  has never smoked. she has never used smokeless tobacco.      Counseling Resources:  ATP IV Guidelines  Pooled Cohorts Equation Calculator  Breast Cancer Risk Calculator  FRAX Risk Assessment  ICSI Preventive Guidelines  Dietary Guidelines for Americans,   USDA's MyPlate  ASA Prophylaxis  Lung CA Screening    Daniel Lao MD  [facility] [place]  "

## 2019-02-19 ENCOUNTER — ANCILLARY PROCEDURE (OUTPATIENT)
Dept: MAMMOGRAPHY | Facility: CLINIC | Age: 64
End: 2019-02-19
Attending: INTERNAL MEDICINE
Payer: COMMERCIAL

## 2019-02-19 DIAGNOSIS — Z12.31 VISIT FOR SCREENING MAMMOGRAM: ICD-10-CM

## 2019-02-19 PROCEDURE — 77067 SCR MAMMO BI INCL CAD: CPT | Mod: TC

## 2019-02-20 PROBLEM — M85.88 OSTEOPENIA OF SPINE: Status: ACTIVE | Noted: 2019-02-20

## 2019-09-26 ENCOUNTER — OFFICE VISIT (OUTPATIENT)
Dept: PEDIATRICS | Facility: CLINIC | Age: 64
End: 2019-09-26
Payer: COMMERCIAL

## 2019-09-26 VITALS
DIASTOLIC BLOOD PRESSURE: 102 MMHG | HEART RATE: 69 BPM | SYSTOLIC BLOOD PRESSURE: 170 MMHG | WEIGHT: 178 LBS | OXYGEN SATURATION: 96 % | BODY MASS INDEX: 30.39 KG/M2 | HEIGHT: 64 IN | TEMPERATURE: 98.4 F

## 2019-09-26 DIAGNOSIS — R03.0 ELEVATED BLOOD PRESSURE READING WITHOUT DIAGNOSIS OF HYPERTENSION: Primary | ICD-10-CM

## 2019-09-26 PROCEDURE — 99213 OFFICE O/P EST LOW 20 MIN: CPT | Mod: GE | Performed by: STUDENT IN AN ORGANIZED HEALTH CARE EDUCATION/TRAINING PROGRAM

## 2019-09-26 PROCEDURE — 93000 ELECTROCARDIOGRAM COMPLETE: CPT | Performed by: STUDENT IN AN ORGANIZED HEALTH CARE EDUCATION/TRAINING PROGRAM

## 2019-09-26 PROCEDURE — 36415 COLL VENOUS BLD VENIPUNCTURE: CPT | Performed by: INTERNAL MEDICINE

## 2019-09-26 PROCEDURE — 80053 COMPREHEN METABOLIC PANEL: CPT | Performed by: INTERNAL MEDICINE

## 2019-09-26 PROCEDURE — 84443 ASSAY THYROID STIM HORMONE: CPT | Performed by: INTERNAL MEDICINE

## 2019-09-26 RX ORDER — LISINOPRIL 10 MG/1
10 TABLET ORAL DAILY
Qty: 30 TABLET | Refills: 1 | Status: SHIPPED | OUTPATIENT
Start: 2019-09-26 | End: 2019-11-14

## 2019-09-26 ASSESSMENT — ANXIETY QUESTIONNAIRES
2. NOT BEING ABLE TO STOP OR CONTROL WORRYING: SEVERAL DAYS
IF YOU CHECKED OFF ANY PROBLEMS ON THIS QUESTIONNAIRE, HOW DIFFICULT HAVE THESE PROBLEMS MADE IT FOR YOU TO DO YOUR WORK, TAKE CARE OF THINGS AT HOME, OR GET ALONG WITH OTHER PEOPLE: NOT DIFFICULT AT ALL
GAD7 TOTAL SCORE: 3
1. FEELING NERVOUS, ANXIOUS, OR ON EDGE: SEVERAL DAYS
5. BEING SO RESTLESS THAT IT IS HARD TO SIT STILL: NOT AT ALL
7. FEELING AFRAID AS IF SOMETHING AWFUL MIGHT HAPPEN: NOT AT ALL
6. BECOMING EASILY ANNOYED OR IRRITABLE: SEVERAL DAYS
3. WORRYING TOO MUCH ABOUT DIFFERENT THINGS: NOT AT ALL

## 2019-09-26 ASSESSMENT — MIFFLIN-ST. JEOR: SCORE: 1339.46

## 2019-09-26 ASSESSMENT — PATIENT HEALTH QUESTIONNAIRE - PHQ9: 5. POOR APPETITE OR OVEREATING: NOT AT ALL

## 2019-09-26 NOTE — PATIENT INSTRUCTIONS
I would recommend purchasing a home blood pressure cuff and checking your blood pressure once/day. Record these numbers and bring them to your next clinic visit. Check your blood pressure while seated.     Please follow up in clinic in 4 weeks to recheck your blood pressure and new medication.

## 2019-09-26 NOTE — PROGRESS NOTES
Subjective     Marivel Hernandez is a 64 year old female who presents to clinic today for the following health issues:    Hypertension: She presents for follow up of hypertension.  She does not check blood pressure  regularly outside of the clinic. Outside blood pressures have been over 140/90. She does not follow a low salt diet.   History of Present Illness        Hypertension: She presents for follow up of hypertension.  She does not check blood pressure  regularly outside of the clinic. Outside blood pressures have been over 140/90. She does not follow a low salt diet.     She eats 2-3 servings of fruits and vegetables daily.She consumes 0 sweetened beverage(s) daily.  She is taking medications regularly.     Hypertension Follow-up    She recently visited her gynecologist a month ago and her blood pressure was elevated, so she was advised to follow up with her PCP to have it checked out - however she felt fine so she did not schedule an appointment at that time. She then saw her dentist a few weeks ago and it was elevated again so she decided to make an appointment.    She is otherwise feeling well - has some brief ringing in her ears in the morning when she wakes up and has noticed she has been veering off pathway when she walks. She feels like she has to be more careful when walking down stairs due to feeling off balance. She did just get glasses this year.     She drinks about a cup and a half of coffee in the morning; no caffeine the rest of the day. She takes no medications on a daily basis; takes imitrex maybe once a month for migraines. She takes calcium, multi-vitamin, and fish oil. 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 She does not follow any particular diet; she does not watch sodium content of foods.    She has had some weight gain over past two years - she thinks due to eating out more often and having a  "desk job.    No recent fevers, chills. Has occasional night sweats for past 6-9 months. No increased urinary frequency. No lower extremity swelling. No chest pain (w/ or w/o exertion). No lightheadedness, dizziness. No headaches other than migraines. Occasionally she feels a sensation of her heart pounding, palpitations - usually while she is sitting, resting.    No history of coronary artery disease, heart failure, CABG.      Current Outpatient Medications   Medication Sig Dispense Refill     lisinopril (PRINIVIL/ZESTRIL) 10 MG tablet Take 1 tablet (10 mg) by mouth daily 30 tablet 1     calcium carbonate (OS-AMBER 500 MG [place]. CA) 500 MG tablet Take 500 mg by mouth 2 times daily       multivitamin, therapeutic with minerals (MULTI-VITAMIN) TABS Take 1 tablet by mouth daily       SUMAtriptan (IMITREX) 100 MG tablet TAKE ONE TABLET BY MOUTH AT ONSET OF HEADACHE FOR MIGRAINE. MAY REPEAT IN 2 HOURS IF NEEDED. MAX OF 2 PER DAY 9 tablet 3     Reviewed and updated as needed this visit by Provider       Review of Systems   ROS COMP: Constitutional, HEENT, cardiovascular, pulmonary, gi and gu systems are negative, except as otherwise noted.      Objective    BP (!) 170/102 (BP Location: Right arm, Cuff Size: Adult Large)   Pulse 69   Temp 98.4  F (36.9  C) (Oral)   Ht 1.613 m (5' 3.5\")   Wt 80.7 kg (178 lb)   SpO2 96%   BMI 31.04 kg/m    Body mass index is 31.04 kg/m .  Physical Exam   GENERAL: healthy, alert and no distress  NECK: no adenopathy, no asymmetry, masses, or scars and thyroid normal to palpation  RESP: lungs clear to auscultation - no rales, rhonchi or wheezes  CV: regular rate and rhythm, normal S1 S2, no S3 or S4, no murmur, click or rub, no peripheral edema and peripheral pulses strong  ABDOMEN: soft, nontender, no hepatosplenomegaly, no masses and bowel sounds normal  MS: no gross musculoskeletal defects noted, no edema  NEURO: normal strength and tone, mentation intact and speech normal  PSYCH: " "mentation appears normal, affect normal/bright    Diagnostic Test Results:  Labs reviewed in Epic  No results found for this or any previous visit (from the past 24 hour(s)).        Assessment & Plan     1. Elevated blood pressure reading without diagnosis of hypertension  BP quite elevated in clinic today - initially 170/102, 160/88 on re-check during visit. This is a significant and somewhat unexpected change from just 7 months ago when her blood pressure was well within normal range at 122/70, however it seems per her report that it was elevated in the last several months at other clinic visits (gyn, dentist). Considered etiologies other than essential HTN like renal artery stenosis, pheo, however these seem unlikely and with her FH of HTN it is most likely essential hypertension. Will check baseline labs and EKG today and start lisinopril with plan for close follow-up. Reviewed possible side effects of lisinopril including cough; she will call with any concerning adverse effects.   - EKG 12-lead complete w/read - Clinics  - Comprehensive metabolic panel (BMP + Alb, Alk Phos, ALT, AST, Total. Bili, TP)  - TSH  - lisinopril (PRINIVIL/ZESTRIL) 10 MG tablet; Take 1 tablet (10 mg) by mouth daily  Dispense: 30 tablet; Refill: 1  - Recommended she purchase a home blood pressure cuff, check BP once daily and record + bring to next visit  - RTC in 4 weeks for BP re-check, med check, BMP     BMI:   Estimated body mass index is 31.04 kg/m  as calculated from the following:    Height as of this encounter: 1.613 m (5' 3.5\").    Weight as of this encounter: 80.7 kg (178 lb).   Weight management plan: Discussed healthy diet and exercise guidelines      Return in about 4 weeks (around 10/24/2019) for BP Recheck.    Jeannette Canchola MD  [facility] [place]    I discussed this case in depth with Dr. Canchola and agree with the key components of the history, assessment and plan.      Viji Najera MD          "

## 2019-09-27 LAB
ALBUMIN SERPL-MCNC: 4.1 G/DL (ref 3.4–5)
ALP SERPL-CCNC: 107 U/L (ref 40–150)
ALT SERPL W P-5'-P-CCNC: 28 U/L (ref 0–50)
ANION GAP SERPL CALCULATED.3IONS-SCNC: 5 MMOL/L (ref 3–14)
AST SERPL W P-5'-P-CCNC: 19 U/L (ref 0–45)
BILIRUB SERPL-MCNC: 0.3 MG/DL (ref 0.2–1.3)
BUN SERPL-MCNC: 20 MG/DL (ref 7–30)
CALCIUM SERPL-MCNC: 10.1 MG/DL (ref 8.5–10.1)
CHLORIDE SERPL-SCNC: 105 MMOL/L (ref 94–109)
CO2 SERPL-SCNC: 30 MMOL/L (ref 20–32)
CREAT SERPL-MCNC: 1.04 MG/DL (ref 0.52–1.04)
GFR SERPL CREATININE-BSD FRML MDRD: 57 ML/MIN/{1.73_M2}
GLUCOSE SERPL-MCNC: 84 MG/DL (ref 70–99)
POTASSIUM SERPL-SCNC: 3.9 MMOL/L (ref 3.4–5.3)
PROT SERPL-MCNC: 7.4 G/DL (ref 6.8–8.8)
SODIUM SERPL-SCNC: 140 MMOL/L (ref 133–144)
TSH SERPL DL<=0.005 MIU/L-ACNC: 2.94 MU/L (ref 0.4–4)

## 2019-09-27 ASSESSMENT — ANXIETY QUESTIONNAIRES: GAD7 TOTAL SCORE: 3

## 2019-10-30 ENCOUNTER — HEALTH MAINTENANCE LETTER (OUTPATIENT)
Age: 64
End: 2019-10-30

## 2019-11-14 ENCOUNTER — OFFICE VISIT (OUTPATIENT)
Dept: PEDIATRICS | Facility: CLINIC | Age: 64
End: 2019-11-14
Payer: COMMERCIAL

## 2019-11-14 VITALS
OXYGEN SATURATION: 98 % | HEART RATE: 76 BPM | DIASTOLIC BLOOD PRESSURE: 68 MMHG | HEIGHT: 64 IN | TEMPERATURE: 97.9 F | SYSTOLIC BLOOD PRESSURE: 126 MMHG | BODY MASS INDEX: 30.4 KG/M2 | RESPIRATION RATE: 16 BRPM | WEIGHT: 178.1 LBS

## 2019-11-14 DIAGNOSIS — R03.0 ELEVATED BLOOD PRESSURE READING WITHOUT DIAGNOSIS OF HYPERTENSION: Primary | ICD-10-CM

## 2019-11-14 DIAGNOSIS — G43.009 MIGRAINE WITHOUT AURA AND WITHOUT STATUS MIGRAINOSUS, NOT INTRACTABLE: ICD-10-CM

## 2019-11-14 PROCEDURE — 99213 OFFICE O/P EST LOW 20 MIN: CPT | Mod: GE | Performed by: STUDENT IN AN ORGANIZED HEALTH CARE EDUCATION/TRAINING PROGRAM

## 2019-11-14 PROCEDURE — 84132 ASSAY OF SERUM POTASSIUM: CPT | Performed by: INTERNAL MEDICINE

## 2019-11-14 PROCEDURE — 82565 ASSAY OF CREATININE: CPT | Performed by: INTERNAL MEDICINE

## 2019-11-14 PROCEDURE — 36415 COLL VENOUS BLD VENIPUNCTURE: CPT | Performed by: INTERNAL MEDICINE

## 2019-11-14 RX ORDER — SUMATRIPTAN 100 MG/1
TABLET, FILM COATED ORAL
Qty: 9 TABLET | Refills: 3 | Status: SHIPPED | OUTPATIENT
Start: 2019-11-14 | End: 2020-10-05

## 2019-11-14 RX ORDER — LISINOPRIL 10 MG/1
10 TABLET ORAL DAILY
Qty: 90 TABLET | Refills: 1 | Status: SHIPPED | OUTPATIENT
Start: 2019-11-14 | End: 2020-06-01

## 2019-11-14 ASSESSMENT — MIFFLIN-ST. JEOR: SCORE: 1339.92

## 2019-11-14 NOTE — PROGRESS NOTES
Subjective     Marivel Heranndez is a 64 year old female who presents to clinic today for the following health issues:    HPI   Hypertension Follow-up      Do you check your blood pressure regularly outside of the clinic? Yes - brought results     Are you following a low salt diet? Not at this time     Are your blood pressures ever more than 140 on the top number (systolic) OR more   than 90 on the bottom number (diastolic), for example 140/90? Yes a few times - mostly 120s over 60-70s    How many servings of fruits and vegetables do you eat daily?  2-3    On average, how many sweetened beverages do you drink each day (soda, juice, sweet tea, etc)?   0  How many days per week do you miss taking your medication? 1    What makes it hard for you to take your medications?  remembering to take     No lightheadedness or dizziness upon standing since starting the blood pressure medication.  Notes that she does have a dry cough that started in the last day or 2 however she also has some sinus congestion and thinks that this is just due to a cold.  Notes she has not really been watching her salt at this time but does think she is adding less salt to things.  She would like a refill of the lisinopril with 90 days because that is what her insurance covers best      Also, notes she would like a refill of her Imitrex for migraines.    -------------------------------------    Patient Active Problem List   Diagnosis     Migraine     Sciatica     CARDIOVASCULAR SCREENING; LDL GOAL LESS THAN 160     Class 1 obesity due to excess calories without serious comorbidity with body mass index (BMI) of 30.0 to 30.9 in adult     Osteopenia of spine     Past Surgical History:   Procedure Laterality Date     C NONSPECIFIC PROCEDURE  1994    Tubal Ligation     HYSTERECTOMY, [facility]  2006       Social History     Tobacco Use     Smoking status: Never Smoker     Smokeless tobacco: Never Used   Substance Use Topics     Alcohol use: Yes     Comment:  "occasional     Family History   Problem Relation Age of Onset     Hypertension Mother      Cancer Mother         colon at age 90     Heart Disease Father 60        aneurysm     Breast Cancer Sister 47     Hypertension Maternal Grandmother      Diabetes Maternal Aunt         all 6 aunts (Mother's sisters)           -------------------------------------  Reviewed and updated as needed this visit by Provider  Tobacco  Allergies  Meds  Problems  Med Hx  Surg Hx  Fam Hx         Review of Systems   ROS COMP: Constitutional, HEENT, cardiovascular, pulmonary, GI, , musculoskeletal, neuro, skin, endocrine and psych systems are negative, except as otherwise noted.      Objective    /68 (BP Location: Right arm, Patient Position: Chair, Cuff Size: Adult Regular)   Pulse 76   Temp 97.9  F (36.6  C) (Oral)   Resp 16   Ht 1.613 m (5' 3.5\")   Wt 80.8 kg (178 lb 1.6 oz)   SpO2 98%   BMI 31.05 kg/m    Body mass index is 31.05 kg/m .  Physical Exam   GENERAL: healthy, alert and no distress  NECK: no adenopathy, no asymmetry, masses, or scars and thyroid normal to palpation  RESP: lungs clear to auscultation - no rales, rhonchi or wheezes  CV: regular rate and rhythm, normal S1 S2, no S3 or S4, no murmur, click or rub, no peripheral edema and peripheral pulses strong  ABDOMEN: soft, nontender, no hepatosplenomegaly, no masses and bowel sounds normal  MS: no gross musculoskeletal defects noted, no edema    Diagnostic Test Results:  Labs reviewed in Epic        Assessment & Plan     1. Elevated blood pressure reading without diagnosis of hypertension  Blood pressure has come down nicely since starting the 10 mg of lisinopril daily at the end of September.  She does not appear to be having any side effects of the medication.  Will plan to recheck creatinine and potassium today.  Medication was refilled and plan for follow-up for annual exam sometimes in the next 6 months or so.  - lisinopril (PRINIVIL/ZESTRIL) 10 MG " "tablet; Take 1 tablet (10 mg) by mouth daily  Dispense: 90 tablet; Refill: 1  - Creatinine  - Potassium    2. Migraine without aura and without status migrainosus, not intractable  - SUMAtriptan (IMITREX) 100 MG tablet; TAKE ONE TABLET BY MOUTH AT ONSET OF HEADACHE FOR MIGRAINE. MAY REPEAT IN 2 HOURS IF NEEDED. MAX OF 2 PER DAY  Dispense: 9 tablet; Refill: 3     BMI:   Estimated body mass index is 31.05 kg/m  as calculated from the following:    Height as of this encounter: 1.613 m (5' 3.5\").    Weight as of this encounter: 80.8 kg (178 lb 1.6 oz).   Weight management plan: Discussed healthy diet and exercise guidelines    Return in about 3 months (around 2/14/2020) for Physical Exam.     Irina Arteaga MD  Internal Medicine & Pediatrics PGY3  [facility]      I have discussed the patient with Dr. Arteaga and agree with the jointly developed plan as documented above.    Irina Manning MD   Internal Medicine-Pediatrics            "

## 2019-11-15 LAB
CREAT SERPL-MCNC: 1.2 MG/DL (ref 0.52–1.04)
GFR SERPL CREATININE-BSD FRML MDRD: 48 ML/MIN/{1.73_M2}
POTASSIUM SERPL-SCNC: 4.2 MMOL/L (ref 3.4–5.3)

## 2020-02-24 ENCOUNTER — TELEPHONE (OUTPATIENT)
Dept: PEDIATRICS | Facility: CLINIC | Age: 65
End: 2020-02-24

## 2020-02-24 NOTE — TELEPHONE ENCOUNTER
Dr. Pimentel,    Please see message and advise    Call placed to Marivel to get more details    Pt reports off and on itching for 1 month. Concerned this may be related to Lisinopril  Started Lisinopril in Sept 2019  -back of hands/wrists bilaterally, jawline face and neck  -no redness, no rash  -itches more after touching that area  -uses lotion every day  -hydrocortisone crm not helpful  -no soap, lotion or dietary changes    Due for refill next week. Wants to know if she should start another medication  Pt will need a call back with recommendations    LOV: 11/14/19 HTN follow up  1. Elevated blood pressure reading without diagnosis of hypertension  Blood pressure has come down nicely since starting the 10 mg of lisinopril daily at the end of September.  She does not appear to be having any side effects of the medication.  Will plan to recheck creatinine and potassium today.  Medication was refilled and plan for follow-up for annual exam sometimes in the next 6 months or so.  - lisinopril (PRINIVIL/ZESTRIL) 10 MG tablet; Take 1 tablet (10 mg) by mouth daily  Dispense: 90 tablet; Refill: 1  - Creatinine  - Potassium    Thank you, Anabel SHERMAN

## 2020-02-24 NOTE — TELEPHONE ENCOUNTER
Itching is an uncommon reaction to lisinopril (1%).  Given the timing, more concern that it is something else.  She should be seen. Has been seeing residents (last saw me 3 yrs ago), so may be able to get her in quickly there.    Consuelo Pimentel MD

## 2020-02-24 NOTE — TELEPHONE ENCOUNTER
Medication Question or Refill  Who is calling: Pt  What medication are you calling about (include dose and sig)?: lisinopril    Pt is having skin irritations-sometimes on face, hands, elbow-on and off x 1 month.    Pt is due for a refill but asking for a different BP med as this may be a side effect of lisinopril.  Please advise.    Controlled Substance Agreement on file: n/a  Who prescribed the medication?: unsure  Do you need a refill? Question/side effects  When did you use the medication last? daily  Patient offered an appointment? No  Do you have any questions or concerns?  Yes  Requested Pharmacy: n/a  Okay to leave a detailed message?: Yes at Cell number on file:    Telephone Information:   Mobile [phone]

## 2020-02-27 ENCOUNTER — OFFICE VISIT (OUTPATIENT)
Dept: PEDIATRICS | Facility: CLINIC | Age: 65
End: 2020-02-27
Payer: COMMERCIAL

## 2020-02-27 VITALS — DIASTOLIC BLOOD PRESSURE: 80 MMHG | SYSTOLIC BLOOD PRESSURE: 117 MMHG | HEART RATE: 7 BPM

## 2020-02-27 DIAGNOSIS — I10 BENIGN ESSENTIAL HYPERTENSION: ICD-10-CM

## 2020-02-27 DIAGNOSIS — L29.9 ITCHING: Primary | ICD-10-CM

## 2020-02-27 LAB
BASOPHILS # BLD AUTO: 0 10E9/L (ref 0–0.2)
BASOPHILS NFR BLD AUTO: 0.1 %
DIFFERENTIAL METHOD BLD: NORMAL
EOSINOPHIL # BLD AUTO: 0.1 10E9/L (ref 0–0.7)
EOSINOPHIL NFR BLD AUTO: 1.4 %
ERYTHROCYTE [DISTWIDTH] IN BLOOD BY AUTOMATED COUNT: 12.9 % (ref 10–15)
ERYTHROCYTE [SEDIMENTATION RATE] IN BLOOD BY WESTERGREN METHOD: 18 MM/H (ref 0–30)
HCT VFR BLD AUTO: 38.2 % (ref 35–47)
HGB BLD-MCNC: 12.3 G/DL (ref 11.7–15.7)
LYMPHOCYTES # BLD AUTO: 2 10E9/L (ref 0.8–5.3)
LYMPHOCYTES NFR BLD AUTO: 27.5 %
MCH RBC QN AUTO: 30.3 PG (ref 26.5–33)
MCHC RBC AUTO-ENTMCNC: 32.2 G/DL (ref 31.5–36.5)
MCV RBC AUTO: 94 FL (ref 78–100)
MONOCYTES # BLD AUTO: 0.6 10E9/L (ref 0–1.3)
MONOCYTES NFR BLD AUTO: 7.6 %
NEUTROPHILS # BLD AUTO: 4.6 10E9/L (ref 1.6–8.3)
NEUTROPHILS NFR BLD AUTO: 63.4 %
PLATELET # BLD AUTO: 216 10E9/L (ref 150–450)
RBC # BLD AUTO: 4.06 10E12/L (ref 3.8–5.2)
WBC # BLD AUTO: 7.3 10E9/L (ref 4–11)

## 2020-02-27 PROCEDURE — 80053 COMPREHEN METABOLIC PANEL: CPT | Performed by: INTERNAL MEDICINE

## 2020-02-27 PROCEDURE — 36415 COLL VENOUS BLD VENIPUNCTURE: CPT | Performed by: INTERNAL MEDICINE

## 2020-02-27 PROCEDURE — 85652 RBC SED RATE AUTOMATED: CPT | Performed by: INTERNAL MEDICINE

## 2020-02-27 PROCEDURE — 85025 COMPLETE CBC W/AUTO DIFF WBC: CPT | Performed by: INTERNAL MEDICINE

## 2020-02-27 PROCEDURE — 99213 OFFICE O/P EST LOW 20 MIN: CPT | Performed by: INTERNAL MEDICINE

## 2020-02-27 PROCEDURE — 86140 C-REACTIVE PROTEIN: CPT | Performed by: INTERNAL MEDICINE

## 2020-02-27 ASSESSMENT — MIFFLIN-ST. JEOR: SCORE: 1328.22

## 2020-02-27 NOTE — PATIENT INSTRUCTIONS
Please follow up with your primary care provider or Dr. Genao after your dermatology appointment, sooner if any increasing, worsening or new symptoms as discussed.

## 2020-02-27 NOTE — PROGRESS NOTES
Subjective     Marivel Hernandez is a 64 year old female who presents to clinic today for the following health issues:    HPI   Rash      Duration: 3x wks     Description  Location: bilateral jaw line and outer wrists  Itching: moderate    Intensity:  moderate    Accompanying signs and symptoms: None    History (similar episodes/previous evaluation): None    Precipitating or alleviating factors:  New exposures:  None and lotions same brand with added SF  New in the past 6 months   Recent travel: no      Therapies tried and outcome: hydrocortisone cream -  not effective    Patient is a 64 year old female with a history of HTN (on lisinopril sin 9/19) who presents with itching only on her outer wrists and along her jaw line.  She cannot think of any specific etiology and denies any changes in lotions detergents or soaps.  She says that she has been using the same shampoo for a while and she does not think that that is really related.  She is wondering about lisinopril but she has been on that since September and does not have any rash or diffuse body itching.  She says nothing in particular is made it better.  She is tried lotion hydrocortisone without help.  She has not noticed any redness or swelling of any her joints.  She will maybe will note of little bit of faint erythema over the wrists but no cruz rash.  She says it is worse in the evening right before going to bed.  She denies any hives or raised lesions.  She has had no other new medications.  Review of systems was reviewed in its entirety and is unremarkable.  She has no other complaints today.    Patient Active Problem List   Diagnosis     Migraine     Sciatica     CARDIOVASCULAR SCREENING; LDL GOAL LESS THAN 160     Class 1 obesity due to excess calories without serious comorbidity with body mass index (BMI) of 30.0 to 30.9 in adult     Osteopenia of spine     Benign essential hypertension     Past Surgical History:   Procedure Laterality Date     C  "NONSPECIFIC PROCEDURE  1994    Tubal Ligation     HYSTERECTOMY, [facility]  2006       Social History     Tobacco Use     Smoking status: Never Smoker     Smokeless tobacco: Never Used   Substance Use Topics     Alcohol use: Yes     Comment: occasional     Family History   Problem Relation Age of Onset     Hypertension Mother      Cancer Mother         colon at age 90     Heart Disease Father 60        aneurysm     Breast Cancer Sister 47     Hypertension Maternal Grandmother      Diabetes Maternal Aunt         all 6 aunts (Mother's sisters)         Current Outpatient Medications   Medication Sig Dispense Refill     calcium carbonate (OS-AMBER 500 MG [place]. [place]) 500 MG tablet Take 500 mg by mouth 2 times daily       lisinopril (PRINIVIL/ZESTRIL) 10 MG tablet Take 1 tablet (10 mg) by mouth daily 90 tablet 1     multivitamin, therapeutic with minerals (MULTI-VITAMIN) TABS Take 1 tablet by mouth daily       Omega-3 Fatty Acids (FISH OIL BURP-LESS PO)        SUMAtriptan (IMITREX) 100 MG tablet TAKE ONE TABLET BY MOUTH AT ONSET OF HEADACHE FOR MIGRAINE. MAY REPEAT IN 2 HOURS IF NEEDED. MAX OF 2 PER DAY 9 tablet 3     Allergies   Allergen Reactions     Chocolate Headache     Dairy Digestive      No Known Allergies       ROS: 10 point ROS neg other than the symptoms noted above in the HPI.      Objective    /80   Pulse (P) 71   Temp (P) 98.4  F (36.9  C) (Oral)   Ht (P) 1.592 m (5' 2.68\")   Wt (P) 80.9 kg (178 lb 6.4 oz)   SpO2 (P) 98%   BMI (P) 31.93 kg/m    Body mass index is 31.93 kg/m  (pended).  Physical Exam   GENERAL: alert and no distress  EYES: conjunctivae and sclerae normal  HENT: oropharynx clear  NECK: supple  RESP: lungs clear to auscultation - no rales, rhonchi or wheezes  CV: regular rate and rhythm, normal S1 S2, no S3 or S4, no murmur, click or rub, no peripheral edema   SKIN: no suspicious lesions or rashes.  Specifically no rash or erythema over the wrists or jaw line.  She does have findings " consistent with rosacea on her face.    Assessment/Plan:    1. Benign essential hypertension:  Under good control  Plan:  On lisinopril    2. Itching:  Etiology not clear.  Less likely lisinopril since there is no rash or systemic symptoms.  Plan  - CBC with platelets differential  - Comprehensive metabolic panel  - CRP inflammation  - Erythrocyte sedimentation rate auto  - DERMATOLOGY REFERRAL    Signs and symptoms that should prompt immediate reevaluation were discussed at length with the patient today.    Please follow up with your primary care provider or Dr. Genao after your dermatology appointment, sooner if any increasing, worsening or new symptoms as discussed.

## 2020-02-28 LAB
ALBUMIN SERPL-MCNC: 3.8 G/DL (ref 3.4–5)
ALP SERPL-CCNC: 112 U/L (ref 40–150)
ALT SERPL W P-5'-P-CCNC: 30 U/L (ref 0–50)
ANION GAP SERPL CALCULATED.3IONS-SCNC: 2 MMOL/L (ref 3–14)
AST SERPL W P-5'-P-CCNC: 15 U/L (ref 0–45)
BILIRUB SERPL-MCNC: 0.3 MG/DL (ref 0.2–1.3)
BUN SERPL-MCNC: 22 MG/DL (ref 7–30)
CALCIUM SERPL-MCNC: 9.7 MG/DL (ref 8.5–10.1)
CHLORIDE SERPL-SCNC: 107 MMOL/L (ref 94–109)
CO2 SERPL-SCNC: 30 MMOL/L (ref 20–32)
CREAT SERPL-MCNC: 1.07 MG/DL (ref 0.52–1.04)
CRP SERPL-MCNC: 4.8 MG/L (ref 0–8)
GFR SERPL CREATININE-BSD FRML MDRD: 55 ML/MIN/{1.73_M2}
GLUCOSE SERPL-MCNC: 86 MG/DL (ref 70–99)
POTASSIUM SERPL-SCNC: 4.4 MMOL/L (ref 3.4–5.3)
PROT SERPL-MCNC: 7.7 G/DL (ref 6.8–8.8)
SODIUM SERPL-SCNC: 139 MMOL/L (ref 133–144)

## 2020-02-28 NOTE — TELEPHONE ENCOUNTER
I called and spoke to the pt and she was seen in clinic yesterday.   Lucy Haque on 2/28/2020 at 9:00 AM

## 2020-03-03 ENCOUNTER — TELEPHONE (OUTPATIENT)
Dept: PEDIATRICS | Facility: CLINIC | Age: 65
End: 2020-03-03

## 2020-03-03 NOTE — TELEPHONE ENCOUNTER
Please notify patient of test results.  CBC with diff, ESR and CRP are acceptable.  CMP shows mildly elevated creatinine/decreased GFR similar to previous readings, otherwise looks good.    Plan:  Follow up with Dermatology as discussed.  Follow up with PCP or Dr. Genao after Dermatology appointment, sooner if needed.

## 2020-03-22 ENCOUNTER — HEALTH MAINTENANCE LETTER (OUTPATIENT)
Age: 65
End: 2020-03-22

## 2020-06-17 ENCOUNTER — ANCILLARY PROCEDURE (OUTPATIENT)
Dept: MAMMOGRAPHY | Facility: CLINIC | Age: 65
End: 2020-06-17
Attending: INTERNAL MEDICINE
Payer: COMMERCIAL

## 2020-06-17 DIAGNOSIS — Z12.31 VISIT FOR SCREENING MAMMOGRAM: ICD-10-CM

## 2020-06-17 PROCEDURE — 77067 SCR MAMMO BI INCL CAD: CPT | Mod: TC

## 2020-09-02 DIAGNOSIS — I10 BENIGN ESSENTIAL HYPERTENSION: Primary | ICD-10-CM

## 2020-09-02 DIAGNOSIS — Z13.6 CARDIOVASCULAR SCREENING; LDL GOAL LESS THAN 160: ICD-10-CM

## 2020-09-02 DIAGNOSIS — R03.0 ELEVATED BLOOD PRESSURE READING WITHOUT DIAGNOSIS OF HYPERTENSION: ICD-10-CM

## 2020-09-02 RX ORDER — LISINOPRIL 10 MG/1
TABLET ORAL
Qty: 30 TABLET | Refills: 0 | Status: SHIPPED | OUTPATIENT
Start: 2020-09-02 | End: 2020-10-05

## 2020-09-02 NOTE — TELEPHONE ENCOUNTER
Routing refill request to provider for review/approval because:  Labs out of range:  Creatinine    Flor Harmon RN

## 2020-09-02 NOTE — TELEPHONE ENCOUNTER
I have not seen her for 4 yrs.  Is very overdue for follow-up.  Please call to schedule lab and BP check then virtual visit or else physical with [facility]

## 2020-09-02 NOTE — TELEPHONE ENCOUNTER
Spoke with pt and states she is wanting to est care with Tashia who she had seen her last Px with in 2019. She declines scheduling labs and BP check and would rather come for just a Px will call back in 1-2wks to schedule    Felipa Beckham MA 3:35 PM 9/2/2020

## 2020-10-01 NOTE — PROGRESS NOTES
# HTN  BP Readings from Last 6 Encounters:   02/27/20 117/80   11/14/19 126/68   09/26/19 (!) 170/102   02/18/19 122/70   07/23/18 124/86   12/23/16 136/86   - lisinopril 10mg     # Migraine  - imitrex    # Osteopenia   - last dexa 2018    # history of colonic polyps  - last colon 12/2017 - need path to determine q5 or q10    # HCM  - mammo utd  - colon utd  - due for zoster  - pap utd

## 2020-10-05 ENCOUNTER — TELEPHONE (OUTPATIENT)
Dept: PEDIATRICS | Facility: CLINIC | Age: 65
End: 2020-10-05

## 2020-10-05 ENCOUNTER — OFFICE VISIT (OUTPATIENT)
Dept: PEDIATRICS | Facility: CLINIC | Age: 65
End: 2020-10-05
Payer: COMMERCIAL

## 2020-10-05 VITALS
DIASTOLIC BLOOD PRESSURE: 68 MMHG | WEIGHT: 182.9 LBS | RESPIRATION RATE: 14 BRPM | BODY MASS INDEX: 32.41 KG/M2 | HEART RATE: 66 BPM | OXYGEN SATURATION: 95 % | SYSTOLIC BLOOD PRESSURE: 124 MMHG | HEIGHT: 63 IN | TEMPERATURE: 98.2 F

## 2020-10-05 DIAGNOSIS — E83.52 HYPERCALCEMIA: ICD-10-CM

## 2020-10-05 DIAGNOSIS — Z00.00 ROUTINE GENERAL MEDICAL EXAMINATION AT A HEALTH CARE FACILITY: Primary | ICD-10-CM

## 2020-10-05 DIAGNOSIS — M85.88 OSTEOPENIA OF SPINE: ICD-10-CM

## 2020-10-05 DIAGNOSIS — I10 BENIGN ESSENTIAL HYPERTENSION: ICD-10-CM

## 2020-10-05 DIAGNOSIS — E66.09 CLASS 1 OBESITY DUE TO EXCESS CALORIES WITH SERIOUS COMORBIDITY AND BODY MASS INDEX (BMI) OF 32.0 TO 32.9 IN ADULT: ICD-10-CM

## 2020-10-05 DIAGNOSIS — G43.009 MIGRAINE WITHOUT AURA AND WITHOUT STATUS MIGRAINOSUS, NOT INTRACTABLE: ICD-10-CM

## 2020-10-05 DIAGNOSIS — N18.31 STAGE 3A CHRONIC KIDNEY DISEASE (H): ICD-10-CM

## 2020-10-05 DIAGNOSIS — E66.811 CLASS 1 OBESITY DUE TO EXCESS CALORIES WITH SERIOUS COMORBIDITY AND BODY MASS INDEX (BMI) OF 32.0 TO 32.9 IN ADULT: ICD-10-CM

## 2020-10-05 DIAGNOSIS — R42 DIZZINESS: ICD-10-CM

## 2020-10-05 PROCEDURE — 36415 COLL VENOUS BLD VENIPUNCTURE: CPT | Performed by: INTERNAL MEDICINE

## 2020-10-05 PROCEDURE — 80048 BASIC METABOLIC PNL TOTAL CA: CPT | Performed by: INTERNAL MEDICINE

## 2020-10-05 PROCEDURE — 82306 VITAMIN D 25 HYDROXY: CPT | Performed by: INTERNAL MEDICINE

## 2020-10-05 PROCEDURE — 99213 OFFICE O/P EST LOW 20 MIN: CPT | Mod: 25 | Performed by: INTERNAL MEDICINE

## 2020-10-05 PROCEDURE — 99396 PREV VISIT EST AGE 40-64: CPT | Performed by: INTERNAL MEDICINE

## 2020-10-05 RX ORDER — LISINOPRIL 10 MG/1
10 TABLET ORAL DAILY
Qty: 90 TABLET | Refills: 3 | Status: SHIPPED | OUTPATIENT
Start: 2020-10-05 | End: 2021-07-05

## 2020-10-05 RX ORDER — SUMATRIPTAN 100 MG/1
TABLET, FILM COATED ORAL
Qty: 9 TABLET | Refills: 3 | Status: SHIPPED | OUTPATIENT
Start: 2020-10-05 | End: 2021-07-05

## 2020-10-05 ASSESSMENT — ENCOUNTER SYMPTOMS
ARTHRALGIAS: 0
WEAKNESS: 0
FEVER: 0
COUGH: 0
HEMATOCHEZIA: 0
HEADACHES: 0
PALPITATIONS: 0
HEARTBURN: 0
ABDOMINAL PAIN: 0
SORE THROAT: 0
NERVOUS/ANXIOUS: 1
PARESTHESIAS: 0
DIZZINESS: 1
HEMATURIA: 0
CONSTIPATION: 0
NAUSEA: 0
CHILLS: 0
EYE PAIN: 0
FREQUENCY: 0
DYSURIA: 0
SHORTNESS OF BREATH: 0
DIARRHEA: 0
JOINT SWELLING: 0
MYALGIAS: 0

## 2020-10-05 ASSESSMENT — MIFFLIN-ST. JEOR: SCORE: 1348.76

## 2020-10-05 NOTE — LETTER
October 9, 2020      Marivel Hernandez  [address] [place] [address]        Hi Ms. Marivel TRUONG David,     It was great to see you in clinic.     I did get a copy of your colonoscopy pathology report and you're right - 10 years!     Your vitamin D level was normal.     Your kidney function and your calcium were both a little elevated- I wonder if you were a bit dehydrated. I'd like to repeat these next week and make sure you're hydrated. If the calcium is still elevated I'll have you back off the supplement.     My team will call you to help schedule this.     Please let me know if you have any questions,   E. Lashae Lao MD   Internal Medicine-Pediatrics     Resulted Orders   **Vitamin D Deficiency FUTURE anytime   Result Value Ref Range    Vitamin D Deficiency screening 72 20 - 75 ug/L      Comment:      Season, race, dietary intake, and treatment affect the concentration of   25-hydroxy-Vitamin D. Values may decrease during winter months and increase   during summer months. Values 20-29 ug/L may indicate Vitamin D insufficiency   and values <20 ug/L may indicate Vitamin D deficiency.  Vitamin D determination is routinely performed by an immunoassay specific for   25 hydroxyvitamin D3.  If an individual is on vitamin D2 (ergocalciferol)   supplementation, please specify 25 OH vitamin D2 and D3 level determination by   LCMSMS test [id number].     Basic metabolic panel  (Ca, Cl, CO2, Creat, Gluc, K, Na, BUN)   Result Value Ref Range    Sodium 139 133 - 144 mmol/L    Potassium 4.1 3.4 - 5.3 mmol/L    Chloride 105 94 - 109 mmol/L    Carbon Dioxide 30 20 - 32 mmol/L    Anion Gap 4 3 - 14 mmol/L    Glucose 87 70 - 99 mg/dL    Urea Nitrogen 22 7 - 30 mg/dL    Creatinine 1.13 (H) 0.52 - 1.04 mg/dL    GFR Estimate 51 (L) >60 mL/min/[1.73_m2]      Comment:      Non  GFR Calc  Starting 12/18/2018, serum creatinine based estimated GFR (eGFR) will be   calculated using the Chronic Kidney Disease  Epidemiology Collaboration   (CKD-EPI) equation.      GFR Estimate If Black 59 (L) >60 mL/min/[1.73_m2]      Comment:       GFR Calc  Starting 12/18/2018, serum creatinine based estimated GFR (eGFR) will be   calculated using the Chronic Kidney Disease Epidemiology Collaboration   (CKD-EPI) equation.      Calcium 10.8 (H) 8.5 - 10.1 mg/dL

## 2020-10-05 NOTE — PATIENT INSTRUCTIONS
For the dizziness I do wonder about BPPV.   Let me know if not improving in the next few weeks or new neuro symptoms.   [employer] - these are the exercises that can be helpful.     I do highly recommend the flu shot.     If you are over 50 I recommend the new Shingrix vaccine. Two doses of Shingrix provides more than 90% protection against shingles and postherpetic neuralgia (PHN), a type of chronic pain that is the most common complication of shingles.   - even if you've had the older shingles vaccine (Zostavax) it's okay to get the new vaccine.   - even if you've had singles before the vaccine can be helpful.    Typically the best (and cheapest!) place to get this vaccine is our pharmacy downstairs. You do not need an appointment and can walk in any time they are open. The vaccine is a two shot series - I recommend getting the second shot 2-6 months after the first dose.    You may have a sore arm and feel mild flu-like symptoms for a day or two after the vaccine. Most people do not need to adjust their regular activities. It's okay to take tylenol or ibuprofen if you have side effects.     Preventive Health Recommendations  Female Ages 50 - 64    Yearly exam: See your health care provider every year in order to  o Review health changes.   o Discuss preventive care.    o Review your medicines if your doctor has prescribed any.      Get a Pap test every three years (unless you have an abnormal result and your provider advises testing more often).    If you get Pap tests with HPV test, you only need to test every 5 years, unless you have an abnormal result.     You do not need a Pap test if your uterus was removed (hysterectomy) and you have not had cancer.    You should be tested each year for STDs (sexually transmitted diseases) if you're at risk.     Have a mammogram every 1 to 2 years.    Have a colonoscopy at age 50, or have a yearly FIT test (stool test). These exams screen for colon cancer.       Have a cholesterol test every 5 years, or more often if advised.    Have a diabetes test (fasting glucose) every three years. If you are at risk for diabetes, you should have this test more often.     If you are at risk for osteoporosis (brittle bone disease), think about having a bone density scan (DEXA).    Shots: Get a flu shot each year. Get a tetanus shot every 10 years.    Nutrition:     Eat at least 5 servings of fruits and vegetables each day.    Eat whole-grain bread, whole-wheat pasta and brown rice instead of white grains and rice.    Get adequate Calcium and Vitamin D.     Lifestyle    Exercise at least 150 minutes a week (30 minutes a day, 5 days a week). This will help you control your weight and prevent disease.    Limit alcohol to one drink per day.    No smoking.     Wear sunscreen to prevent skin cancer.     See your dentist every six months for an exam and cleaning.    See your eye doctor every 1 to 2 years.    Preventive Health Recommendations  Female Ages 50 - 64    Yearly exam: See your health care provider every year in order to  o Review health changes.   o Discuss preventive care.    o Review your medicines if your doctor has prescribed any.      Get a Pap test every three years (unless you have an abnormal result and your provider advises testing more often).    If you get Pap tests with HPV test, you only need to test every 5 years, unless you have an abnormal result.     You do not need a Pap test if your uterus was removed (hysterectomy) and you have not had cancer.    You should be tested each year for STDs (sexually transmitted diseases) if you're at risk.     Have a mammogram every 1 to 2 years.    Have a colonoscopy at age 50, or have a yearly FIT test (stool test). These exams screen for colon cancer.      Have a cholesterol test every 5 years, or more often if advised.    Have a diabetes test (fasting glucose) every three years. If you are at risk for diabetes, you should have  this test more often.     If you are at risk for osteoporosis (brittle bone disease), think about having a bone density scan (DEXA).    Shots: Get a flu shot each year. Get a tetanus shot every 10 years.    Nutrition:     Eat at least 5 servings of fruits and vegetables each day.    Eat whole-grain bread, whole-wheat pasta and brown rice instead of white grains and rice.    Get adequate Calcium and Vitamin D.     Lifestyle    Exercise at least 150 minutes a week (30 minutes a day, 5 days a week). This will help you control your weight and prevent disease.    Limit alcohol to one drink per day.    No smoking.     Wear sunscreen to prevent skin cancer.     See your dentist every six months for an exam and cleaning.    See your eye doctor every 1 to 2 years.

## 2020-10-05 NOTE — PROGRESS NOTES
SUBJECTIVE:   CC: Marivel Hernandez is an 65 year old woman who presents for preventive health visit.     Patient has been advised of split billing requirements and indicates understanding: Yes  Healthy Habits:     Getting at least 3 servings of Calcium per day:  Yes    Bi-annual eye exam:  Yes    Dental care twice a year:  Yes    Sleep apnea or symptoms of sleep apnea:  None    Diet:  Regular (no restrictions)    Frequency of exercise:  2-3 days/week    Duration of exercise:  Less than 15 minutes    Taking medications regularly:  Yes    Medication side effects:  None    PHQ-2 Total Score: 0    Additional concerns today:  Yes     Today's PHQ-2 Score:   PHQ-2 ( 1999 Pfizer) 10/5/2020   Q1: Little interest or pleasure in doing things 0   Q2: Feeling down, depressed or hopeless 0   PHQ-2 Score 0   Q1: Little interest or pleasure in doing things Not at all   Q2: Feeling down, depressed or hopeless Not at all   PHQ-2 Score 0     Abuse: Current or Past (Physical, Sexual or Emotional) - No  Do you feel safe in your environment? Yes    Have you ever done Advance Care Planning? (For example, a Health Directive, POLST, or a discussion with a medical provider or your loved ones about your wishes): Yes, patient states has an Advance Care Planning document and will bring a copy to the clinic.    Social History     Tobacco Use     Smoking status: Never Smoker     Smokeless tobacco: Never Used   Substance Use Topics     Alcohol use: Yes     Comment: occasional     Alcohol Use 10/5/2020   Prescreen: >3 drinks/day or >7 drinks/week? No   Prescreen: >3 drinks/day or >7 drinks/week? -     Reviewed orders with patient.  Reviewed health maintenance and updated orders accordingly - Yes  Patient Active Problem List   Diagnosis     Migraine     Sciatica     CARDIOVASCULAR SCREENING; LDL GOAL LESS THAN 160     Class 1 obesity due to excess calories without serious comorbidity with body mass index (BMI) of 30.0 to 30.9 in adult      Osteopenia of spine     Benign essential hypertension     Past Surgical History:   Procedure Laterality Date     HYSTERECTOMY, AMY  2006     ZZC NONSPECIFIC PROCEDURE  1994    Tubal Ligation       Social History     Tobacco Use     Smoking status: Never Smoker     Smokeless tobacco: Never Used   Substance Use Topics     Alcohol use: Yes     Comment: occasional     Family History   Problem Relation Age of Onset     Hypertension Mother      Cancer Mother         colon at age 90     Heart Disease Father 60        aneurysm     Breast Cancer Sister 47     Hypertension Maternal Grandmother      Diabetes Maternal Aunt         all 6 aunts (Mother's sisters)         Current Outpatient Medications   Medication Sig Dispense Refill     calcium carbonate (OS-AMBER 500 MG [place]. [place]) 500 MG tablet Take 500 mg by mouth 2 times daily       lisinopril (ZESTRIL) 10 MG tablet Take 1 tablet (10 mg) by mouth daily 90 tablet 3     multivitamin, therapeutic with minerals (MULTI-VITAMIN) TABS Take 1 tablet by mouth daily       SUMAtriptan (IMITREX) 100 MG tablet TAKE ONE TABLET BY MOUTH AT ONSET OF HEADACHE FOR MIGRAINE. MAY REPEAT IN 2 HOURS IF NEEDED. MAX OF 2 PER DAY 9 tablet 3     Omega-3 Fatty Acids (FISH OIL BURP-LESS PO)        Allergies   Allergen Reactions     Chocolate Headache     Dairy Digestive      No Known Allergies        Mammogram Screening: Patient over age 50, mutual decision to screen reflected in health maintenance.    Pertinent mammograms are reviewed under the imaging tab.  History of abnormal Pap smear: NO - age 65 - see link Cervical Cytology Screening Guidelines     Reviewed and updated as needed this visit by clinical staff  Tobacco  Allergies    Med Hx  Surg Hx  Fam Hx  Soc Hx        Reviewed and updated as needed this visit by Provider                Past Medical History:   Diagnosis Date     Migraine, unspecified, without mention of intractable migraine without mention of status migrainosus      Sciatica      MRI 1999; L4L5 mild disc protrusion     Ulcerative colitis (H)       Past Surgical History:   Procedure Laterality Date     HYSTERECTOMY, AMY  2006     Z NONSPECIFIC PROCEDURE  1994    Tubal Ligation      # HTN  BP Readings from Last 6 Encounters:   02/27/20 117/80   11/14/19 126/68   09/26/19 (!) 170/102   02/18/19 122/70   07/23/18 124/86   12/23/16 136/86   - lisinopril 10mg     # Migraine  - imitrex    # Osteopenia   - last dexa 2018    # history of colonic polyps  - last colon 12/2017 - need path to determine q5 or q10    The 10-year ASCVD risk score (Eric LLANES Jr., et al., 2013) is: 6%    Values used to calculate the score:      Age: 65 years      Sex: Female      Is Non- : No      Diabetic: No      Tobacco smoker: No      Systolic Blood Pressure: 124 mmHg      Is BP treated: Yes      HDL Cholesterol: 59 mg/dL      Total Cholesterol: 194 mg/dL    # Lightheaded  - started Friday  - looking down or laying in the bed  - no ear pain  - not going away  - bp was good okay  - no recent colds   - left ear occasionally hurts but better if she pushes on it.  - no speech/senstaion/numbness/tingling  - had a migraine last week - went away with one imitrex   - lasts for seconds     # Mental health   - some days a little down  - does have a little anxiety  Luiz in the car - worse as passendger, doesn't like to drive bc her reaction time has slowed down    # HCM  - mammo utd  - colon utd  - due for zoster  - pap utd    Review of Systems   Constitutional: Negative for chills and fever.   HENT: Negative for congestion, ear pain, hearing loss and sore throat.    Eyes: Negative for pain and visual disturbance.   Respiratory: Negative for cough and shortness of breath.    Cardiovascular: Negative for chest pain, palpitations and peripheral edema.   Gastrointestinal: Negative for abdominal pain, constipation, diarrhea, heartburn, hematochezia and nausea.   Genitourinary: Negative for dysuria, frequency,  "genital sores, hematuria and urgency.   Musculoskeletal: Negative for arthralgias, joint swelling and myalgias.   Skin: Negative for rash.   Neurological: Positive for dizziness. Negative for weakness, headaches and paresthesias.   Psychiatric/Behavioral: Negative for mood changes. The patient is nervous/anxious.      CONSTITUTIONAL: NEGATIVE for fever, chills, change in weight  INTEGUMENTARY/SKIN: NEGATIVE for worrisome rashes, moles or lesions  EYES: NEGATIVE for vision changes or irritation  ENT: NEGATIVE for ear, mouth and throat problems  RESP: NEGATIVE for significant cough or SOB  BREAST: NEGATIVE for masses, tenderness or discharge  CV: NEGATIVE for chest pain, palpitations or peripheral edema  GI: NEGATIVE for nausea, abdominal pain, heartburn, or change in bowel habits  : NEGATIVE for unusual urinary or vaginal symptoms. No vaginal bleeding.  MUSCULOSKELETAL: NEGATIVE for significant arthralgias or myalgia  NEURO: NEGATIVE for weakness, dizziness or paresthesias  PSYCHIATRIC: NEGATIVE for changes in mood or affect      OBJECTIVE:   /68 (BP Location: Right arm, Patient Position: Chair, Cuff Size: Adult Large)   Pulse 66   Temp 98.2  F (36.8  C) (Tympanic)   Resp 14   Ht 1.6 m (5' 3\")   Wt 83 kg (182 lb 14.4 oz)   SpO2 95%   Breastfeeding No   BMI 32.40 kg/m    Physical Exam  GENERAL: healthy, alert and no distress  EYES: Eyes grossly normal to inspection, PERRL and conjunctivae and sclerae normal  HENT: ear canals and TM's normal, nose and mouth without ulcers or lesions  NECK: no adenopathy, no asymmetry, masses, or scars and thyroid normal to palpation  RESP: lungs clear to auscultation - no rales, rhonchi or wheezes  CV: regular rate and rhythm, normal S1 S2, no S3 or S4, no murmur, click or rub, no peripheral edema and peripheral pulses strong  ABDOMEN: soft, nontender, no hepatosplenomegaly, no masses and bowel sounds normal  MS: no gross musculoskeletal defects noted, no edema  SKIN: " no suspicious lesions or rashes  NEURO: Normal strength and tone, mentation intact and speech normal; CN2-12 grossly intact, normal finger/nose, normal rapid hand movement, normal gait  PSYCH: mentation appears normal, affect normal/bright    Diagnostic Test Results:  Labs reviewed in Epic    ASSESSMENT/PLAN:   1. Routine general medical examination at a health care facility  - completed paps  - utd mammo  - will get [facility] from [facility] to determine colonoscopy frequency (pt recalls q10)  - declines flu vaccine today   - will look into shingles vaccine    2. Class 1 obesity due to excess calories with serious comorbidity and body mass index (BMI) of 32.0 to 32.9 in adult  Discussed staying active.     3. Benign essential hypertension  BP Readings from Last 6 Encounters:   10/05/20 124/68   02/27/20 117/80   11/14/19 126/68   09/26/19 (!) 170/102   02/18/19 122/70   07/23/18 124/86   BP looks good - continue meds.  - Albumin Random Urine Quantitative with Creat Ratio; Future  - Basic metabolic panel  (Ca, Cl, CO2, Creat, Gluc, K, Na, BUN)  - lisinopril (ZESTRIL) 10 MG tablet; Take 1 tablet (10 mg) by mouth daily  Dispense: 90 tablet; Refill: 3    4. Dizziness  Suspect BPPV. No s/s of posterior circulation stroke. If no improvement she will call and can refer to vestibular physical therapy.    5. Osteopenia of spine  - **Vitamin D Deficiency FUTURE anytime    6. Migraine without aura and without status migrainosus, not intractable  Good control.   - SUMAtriptan (IMITREX) 100 MG tablet; TAKE ONE TABLET BY MOUTH AT ONSET OF HEADACHE FOR MIGRAINE. MAY REPEAT IN 2 HOURS IF NEEDED. MAX OF 2 PER DAY  Dispense: 9 tablet; Refill: 3    COUNSELING:  Reviewed preventive health counseling, as reflected in patient instructions       Regular exercise       Healthy diet/nutrition       Immunizations       Osteoporosis Prevention/Bone Health    Estimated body mass index is 32.4 kg/m  as calculated from the following:    Height as of  "this encounter: 1.6 m (5' 3\").    Weight as of this encounter: 83 kg (182 lb 14.4 oz).    Weight management plan: Discussed healthy diet and exercise guidelines    She reports that she has never smoked. She has never used smokeless tobacco.      Counseling Resources:  ATP IV Guidelines  Pooled Cohorts Equation Calculator  Breast Cancer Risk Calculator  BRCA-Related Cancer Risk Assessment: FHS-7 Tool  FRAX Risk Assessment  ICSI Preventive Guidelines  Dietary Guidelines for Americans, 2010  USDA's MyPlate  ASA Prophylaxis  Lung CA Screening    Daniel Lao MD  [facility] [place]  "

## 2020-10-05 NOTE — PROGRESS NOTES
"   SUBJECTIVE:   CC: Marivel Hernandez is an 65 year old woman who presents for preventive health visit.     Patient has been advised of split billing requirements and indicates understanding: Yes     HPI              Today's PHQ-2 Score:   PHQ-2 ( 1999 [employer]) 9/26/2019   Q1: Little interest or pleasure in doing things 0   Q2: Feeling down, depressed or hopeless 0   PHQ-2 Score 0   Q1: Little interest or pleasure in doing things Not at all   Q2: Feeling down, depressed or hopeless Not at all   PHQ-2 Score 0   Abuse: Current or Past (Physical, Sexual or Emotional) - { :294060}  Do you feel safe in your environment? { :092829}  Have you ever done Advance Care Planning? (For example, a Health Directive, POLST, or a discussion with a medical provider or your loved ones about your wishes): { :060200}    Social History     Tobacco Use     Smoking status: Never Smoker     Smokeless tobacco: Never Used   Substance Use Topics     Alcohol use: Yes     Comment: occasional         Alcohol Use 2/18/2019   Prescreen: >3 drinks/day or >7 drinks/week? No   Prescreen: >3 drinks/day or >7 drinks/week? -       Reviewed orders with patient.  Reviewed health maintenance and updated orders accordingly - { :712994::\"Yes\"}  {Chronicprobdata (optional):070647}    {Mammo Decision Support (Optional):998383}    Pertinent mammograms are reviewed under the imaging tab.  History of abnormal Pap smear: { :877056}     Reviewed and updated as needed this visit by clinical staff                 Reviewed and updated as needed this visit by Provider                {HISTORY OPTIONS (Optional):621853}    Review of Systems  {FEMALE ROS (Optional):091537}     OBJECTIVE:   There were no vitals taken for this visit.  Physical Exam  {Exam Choices (Optional):390588}    {Diagnostic Test Results (Optional):497713::\"Diagnostic Test Results:\",\"Labs reviewed in Epic\"}    ASSESSMENT/PLAN:   {Diag Picklist:590835}    Patient has been advised of split billing " "requirements and indicates understanding: {YES / NO:918212::\"Yes\"}  COUNSELING:  {FEMALE COUNSELING MESSAGES:246704::\"Reviewed preventive health counseling, as reflected in patient instructions\"}    Estimated body mass index is 31.93 kg/m  (pended) as calculated from the following:    Height as of 2/27/20: (P) 1.592 m (5' 2.68\").    Weight as of 2/27/20: (P) 80.9 kg (178 lb 6.4 oz).    {Weight Management Plan (ACO) Complete if BMI is abnormal-  Ages 18-64  BMI >24.9.  Age 65+ with BMI <23 or >30 (Optional):875692}    She reports that she has never smoked. She has never used smokeless tobacco.      Counseling Resources:  ATP IV Guidelines  Pooled Cohorts Equation Calculator  Breast Cancer Risk Calculator  BRCA-Related Cancer Risk Assessment: FHS-7 Tool  FRAX Risk Assessment  ICSI Preventive Guidelines  Dietary Guidelines for Americans, 2010  USDA's MyPlate  ASA Prophylaxis  Lung CA Screening    Daniel Lao MD  [facility] [place]  "

## 2020-10-06 LAB
ANION GAP SERPL CALCULATED.3IONS-SCNC: 4 MMOL/L (ref 3–14)
BUN SERPL-MCNC: 22 MG/DL (ref 7–30)
CALCIUM SERPL-MCNC: 10.8 MG/DL (ref 8.5–10.1)
CHLORIDE SERPL-SCNC: 105 MMOL/L (ref 94–109)
CO2 SERPL-SCNC: 30 MMOL/L (ref 20–32)
CREAT SERPL-MCNC: 1.13 MG/DL (ref 0.52–1.04)
DEPRECATED CALCIDIOL+CALCIFEROL SERPL-MC: 72 UG/L (ref 20–75)
GFR SERPL CREATININE-BSD FRML MDRD: 51 ML/MIN/{1.73_M2}
GLUCOSE SERPL-MCNC: 87 MG/DL (ref 70–99)
POTASSIUM SERPL-SCNC: 4.1 MMOL/L (ref 3.4–5.3)
SODIUM SERPL-SCNC: 139 MMOL/L (ref 133–144)

## 2020-10-06 NOTE — TELEPHONE ENCOUNTER
Please call [facility] and ask for most recent colon + pathology to be faxed (need path to determine interval).     ABY Lao MD  Internal Medicine-Pediatrics

## 2020-10-20 DIAGNOSIS — I10 BENIGN ESSENTIAL HYPERTENSION: ICD-10-CM

## 2020-10-20 PROCEDURE — 80048 BASIC METABOLIC PNL TOTAL CA: CPT | Performed by: INTERNAL MEDICINE

## 2020-10-20 PROCEDURE — 82043 UR ALBUMIN QUANTITATIVE: CPT | Performed by: INTERNAL MEDICINE

## 2020-10-20 PROCEDURE — 36415 COLL VENOUS BLD VENIPUNCTURE: CPT | Performed by: INTERNAL MEDICINE

## 2020-10-22 PROBLEM — N18.30 CHRONIC KIDNEY DISEASE, STAGE 3 (H): Status: ACTIVE | Noted: 2020-10-22

## 2020-10-22 LAB
ANION GAP SERPL CALCULATED.3IONS-SCNC: 5 MMOL/L (ref 3–14)
BUN SERPL-MCNC: 22 MG/DL (ref 7–30)
CALCIUM SERPL-MCNC: 10.6 MG/DL (ref 8.5–10.1)
CHLORIDE SERPL-SCNC: 106 MMOL/L (ref 94–109)
CO2 SERPL-SCNC: 30 MMOL/L (ref 20–32)
CREAT SERPL-MCNC: 1.22 MG/DL (ref 0.52–1.04)
CREAT UR-MCNC: 35 MG/DL
GFR SERPL CREATININE-BSD FRML MDRD: 47 ML/MIN/{1.73_M2}
GLUCOSE SERPL-MCNC: 72 MG/DL (ref 70–99)
MICROALBUMIN UR-MCNC: <5 MG/L
MICROALBUMIN/CREAT UR: NORMAL MG/G CR (ref 0–25)
POTASSIUM SERPL-SCNC: 4.2 MMOL/L (ref 3.4–5.3)
SODIUM SERPL-SCNC: 141 MMOL/L (ref 133–144)

## 2020-12-21 DIAGNOSIS — E83.52 HYPERCALCEMIA: ICD-10-CM

## 2020-12-21 PROCEDURE — 36415 COLL VENOUS BLD VENIPUNCTURE: CPT | Performed by: INTERNAL MEDICINE

## 2020-12-21 PROCEDURE — 80048 BASIC METABOLIC PNL TOTAL CA: CPT | Performed by: INTERNAL MEDICINE

## 2020-12-22 LAB
ANION GAP SERPL CALCULATED.3IONS-SCNC: 2 MMOL/L (ref 3–14)
BUN SERPL-MCNC: 17 MG/DL (ref 7–30)
CALCIUM SERPL-MCNC: 10.1 MG/DL (ref 8.5–10.1)
CHLORIDE SERPL-SCNC: 107 MMOL/L (ref 94–109)
CO2 SERPL-SCNC: 30 MMOL/L (ref 20–32)
CREAT SERPL-MCNC: 1.07 MG/DL (ref 0.52–1.04)
GFR SERPL CREATININE-BSD FRML MDRD: 55 ML/MIN/{1.73_M2}
GLUCOSE SERPL-MCNC: 124 MG/DL (ref 70–99)
POTASSIUM SERPL-SCNC: 3.9 MMOL/L (ref 3.4–5.3)
SODIUM SERPL-SCNC: 139 MMOL/L (ref 133–144)

## 2021-01-15 ENCOUNTER — HEALTH MAINTENANCE LETTER (OUTPATIENT)
Age: 66
End: 2021-01-15

## 2021-06-17 DIAGNOSIS — Z12.31 VISIT FOR SCREENING MAMMOGRAM: ICD-10-CM

## 2021-06-17 PROCEDURE — 77067 SCR MAMMO BI INCL CAD: CPT | Mod: TC | Performed by: RADIOLOGY

## 2021-07-05 ENCOUNTER — OFFICE VISIT (OUTPATIENT)
Dept: PEDIATRICS | Facility: CLINIC | Age: 66
End: 2021-07-05
Payer: COMMERCIAL

## 2021-07-05 VITALS
BODY MASS INDEX: 30.42 KG/M2 | TEMPERATURE: 98.4 F | OXYGEN SATURATION: 97 % | HEART RATE: 69 BPM | RESPIRATION RATE: 18 BRPM | DIASTOLIC BLOOD PRESSURE: 70 MMHG | HEIGHT: 63 IN | SYSTOLIC BLOOD PRESSURE: 110 MMHG | WEIGHT: 171.7 LBS

## 2021-07-05 DIAGNOSIS — R19.7 DIARRHEA, UNSPECIFIED TYPE: ICD-10-CM

## 2021-07-05 DIAGNOSIS — I10 BENIGN ESSENTIAL HYPERTENSION: ICD-10-CM

## 2021-07-05 DIAGNOSIS — M85.88 OSTEOPENIA OF SPINE: ICD-10-CM

## 2021-07-05 DIAGNOSIS — Z00.00 ENCOUNTER FOR MEDICARE ANNUAL WELLNESS EXAM: Primary | ICD-10-CM

## 2021-07-05 DIAGNOSIS — G43.009 MIGRAINE WITHOUT AURA AND WITHOUT STATUS MIGRAINOSUS, NOT INTRACTABLE: ICD-10-CM

## 2021-07-05 DIAGNOSIS — N18.31 STAGE 3A CHRONIC KIDNEY DISEASE (H): ICD-10-CM

## 2021-07-05 LAB
ERYTHROCYTE [DISTWIDTH] IN BLOOD BY AUTOMATED COUNT: 13 % (ref 10–15)
HCT VFR BLD AUTO: 37.2 % (ref 35–47)
HGB BLD-MCNC: 11.8 G/DL (ref 11.7–15.7)
MCH RBC QN AUTO: 30.9 PG (ref 26.5–33)
MCHC RBC AUTO-ENTMCNC: 31.7 G/DL (ref 31.5–36.5)
MCV RBC AUTO: 97 FL (ref 78–100)
PLATELET # BLD AUTO: 219 10E9/L (ref 150–450)
RBC # BLD AUTO: 3.82 10E12/L (ref 3.8–5.2)
WBC # BLD AUTO: 8.1 10E9/L (ref 4–11)

## 2021-07-05 PROCEDURE — 82306 VITAMIN D 25 HYDROXY: CPT | Performed by: INTERNAL MEDICINE

## 2021-07-05 PROCEDURE — 99397 PER PM REEVAL EST PAT 65+ YR: CPT | Mod: 25 | Performed by: INTERNAL MEDICINE

## 2021-07-05 PROCEDURE — 90670 PCV13 VACCINE IM: CPT | Performed by: INTERNAL MEDICINE

## 2021-07-05 PROCEDURE — 82043 UR ALBUMIN QUANTITATIVE: CPT | Performed by: INTERNAL MEDICINE

## 2021-07-05 PROCEDURE — 85027 COMPLETE CBC AUTOMATED: CPT | Performed by: INTERNAL MEDICINE

## 2021-07-05 PROCEDURE — 90471 IMMUNIZATION ADMIN: CPT | Performed by: INTERNAL MEDICINE

## 2021-07-05 PROCEDURE — 36415 COLL VENOUS BLD VENIPUNCTURE: CPT | Performed by: INTERNAL MEDICINE

## 2021-07-05 PROCEDURE — 80053 COMPREHEN METABOLIC PANEL: CPT | Performed by: INTERNAL MEDICINE

## 2021-07-05 PROCEDURE — 99214 OFFICE O/P EST MOD 30 MIN: CPT | Mod: 25 | Performed by: INTERNAL MEDICINE

## 2021-07-05 RX ORDER — LISINOPRIL 10 MG/1
10 TABLET ORAL DAILY
Qty: 90 TABLET | Refills: 3 | Status: SHIPPED | OUTPATIENT
Start: 2021-07-05 | End: 2022-08-05

## 2021-07-05 RX ORDER — SUMATRIPTAN 100 MG/1
TABLET, FILM COATED ORAL
Qty: 9 TABLET | Refills: 3 | Status: SHIPPED | OUTPATIENT
Start: 2021-07-05 | End: 2023-08-20

## 2021-07-05 ASSESSMENT — ACTIVITIES OF DAILY LIVING (ADL): CURRENT_FUNCTION: NO ASSISTANCE NEEDED

## 2021-07-05 ASSESSMENT — MIFFLIN-ST. JEOR: SCORE: 1292.96

## 2021-07-05 NOTE — PATIENT INSTRUCTIONS
Great to see you!    If you are over 50 I recommend the new Shingrix vaccine. Two doses of Shingrix provides more than 90% protection against shingles and postherpetic neuralgia (PHN), a type of chronic pain that is the most common complication of shingles.   - even if you've had the older shingles vaccine (Zostavax) it's okay to get the new vaccine.   - even if you've had singles before the vaccine can be helpful.    Typically the best (and cheapest!) place to get this vaccine is our pharmacy downstairs. You do not need an appointment and can walk in any time they are open. The vaccine is a two shot series - I recommend getting the second shot 2-6 months after the first dose.    You may have a sore arm and feel mild flu-like symptoms for a day or two after the vaccine. Most people do not need to adjust their regular activities. It's okay to take tylenol or ibuprofen if you have side effects.     Patient Education   Personalized Prevention Plan  You are due for the preventive services outlined below.  Your care team is available to assist you in scheduling these services.  If you have already completed any of these items, please share that information with your care team to update in your medical record.  Health Maintenance Due   Topic Date Due     ANNUAL REVIEW OF HM ORDERS  Never done     Zoster (Shingles) Vaccine (1 of 2) Never done     PHQ-2  01/01/2021     FALL RISK ASSESSMENT  Never done     Pneumococcal Vaccine (1 of 1 - PPSV23) 06/17/2021

## 2021-07-05 NOTE — PROGRESS NOTES
"SUBJECTIVE:   Marivel Hernandez is a 66 year old female who presents for Preventive Visit.  Patient has been advised of split billing requirements and indicates understanding: Yes   Are you in the first 12 months of your Medicare coverage?  Yes,  Patient declined visual acuity due to annual exams with an eye doctor.     Healthy Habits:    In general, how would you rate your overall health?  Excellent    Frequency of exercise:  2-3 days/week    Duration of exercise:  Greater than 60 minutes    Do you usually eat at least 4 servings of fruit and vegetables a day, include whole grains    & fiber and avoid regularly eating high fat or \"junk\" foods?  No    Taking medications regularly:  Yes    Barriers to taking medications:  None    Medication side effects:  None    Ability to successfully perform activities of daily living:  No assistance needed    Home Safety:  No safety concerns identified    Hearing Impairment:  No hearing concerns    In the past 6 months, have you been bothered by leaking of urine?  No    In general, how would you rate your overall mental or emotional health?  Excellent      PHQ-2 Total Score:    Additional concerns today:  No     # Social  - went on vacation - [place], [place]. \[facility].\""    # HTN  BP Readings from Last 6 Encounters:   07/05/21 110/70   10/05/20 124/68   02/27/20 117/80   11/14/19 126/68   09/26/19 (!) 170/102   02/18/19 122/70   - Lisinopril 10    # Migraine  - imitrex    # CKD stage 3  creatinine stable ~1.1    # Osteopenia  - last dexa 2018  Vitamin D Deficiency Screening Results:  Lab Results   Component Value Date    VITDT 72 10/05/2020     The 10-year ASCVD risk score (Eric LLANES Jr., et al., 2013) is: 5.3%    Values used to calculate the score:      Age: 66 years      Sex: Female      Is Non- : No      Diabetic: No      Tobacco smoker: No      Systolic Blood Pressure: 110 mmHg      Is BP treated: Yes      HDL Cholesterol: 59 mg/dL      Total " Cholesterol: 194 mg/dL    # Stomach   - bad cramping in lower pelvic area & diarrhea -> lasts 2-3 days and then back to normal ... cramping lessened over time  - noticed it with raw onions  - happened beginning of may and just a few days ago    Do you feel safe in your environment? Yes  Have you ever done Advance Care Planning? (For example, a Health Directive, POLST, or a discussion with a medical provider or your loved ones about your wishes): Yes, advance care planning is on file.     Fall risk  Fallen 2 or more times in the past year?: No  Any fall with injury in the past year?: No    Cognitive Screening   1) Repeat 3 items (Leader, Season, Table)    2) Clock draw: NORMAL  3) 3 item recall: Recalls 3 objects  Results: 3 items recalled: COGNITIVE IMPAIRMENT LESS LIKELY    Mini-CogTM Copyright S Santos. Licensed by the author for use in [facility]; reprinted with permission (janes@[facility]). All rights reserved.        Reviewed and updated as needed this visit by clinical staff  Tobacco  Allergies  Meds   Med Hx  Surg Hx  Fam Hx  Soc Hx        Reviewed and updated as needed this visit by Provider    Meds             Social History     Tobacco Use     Smoking status: Never Smoker     Smokeless tobacco: Never Used   Substance Use Topics     Alcohol use: Yes     Comment: occasional       Alcohol Use 10/5/2020   Prescreen: >3 drinks/day or >7 drinks/week? No   Prescreen: >3 drinks/day or >7 drinks/week? -         Current providers sharing in care for this patient include:   Patient Care Team:  Daniel Lao MD as PCP - General (Internal Medicine)  Daniel Lao MD as Assigned PCP    The following health maintenance items are reviewed in Epic and correct as of today:  Health Maintenance Due   Topic Date Due     ANNUAL REVIEW OF HM ORDERS  Never done     ZOSTER IMMUNIZATION (1 of 2) Never done     PHQ-2  01/01/2021     FALL RISK ASSESSMENT  Never done     Lab work is in  "process  Labs reviewed in EPIC  Pneumonia Vaccine:Adults age 65+ who have not received previous Pneumovax (PPSV23) or PCV13 as an adult: Should first be given PCV13 AND then should be given PPSV23 6-12 months after PCV13  Any new diagnosis of family breast, ovarian, or bowel cancer? No    FHS-7: No flowsheet data found.     Pertinent mammograms are reviewed under the imaging tab.    Review of Systems  Constitutional, HEENT, cardiovascular, pulmonary, gi and gu systems are negative, except as otherwise noted.    OBJECTIVE:   /70 (BP Location: Right arm, Patient Position: Sitting, Cuff Size: Adult Regular)   Pulse 69   Temp 98.4  F (36.9  C) (Tympanic)   Resp 18   Ht 1.6 m (5' 3\")   Wt 77.9 kg (171 lb 11.2 oz)   SpO2 97%   BMI 30.42 kg/m   Estimated body mass index is 30.42 kg/m  as calculated from the following:    Height as of this encounter: 1.6 m (5' 3\").    Weight as of this encounter: 77.9 kg (171 lb 11.2 oz).  Physical Exam  GENERAL: healthy, alert and no distress  EYES: Eyes grossly normal to inspection, PERRL and conjunctivae and sclerae normal  HENT: ear canals and TM's normal, nose and mouth without ulcers or lesions  NECK: no adenopathy, no asymmetry, masses, or scars and thyroid normal to palpation  RESP: lungs clear to auscultation - no rales, rhonchi or wheezes  CV: regular rate and rhythm, normal S1 S2, no S3 or S4, no murmur, click or rub, no peripheral edema and peripheral pulses strong  ABDOMEN: soft, nontender, no hepatosplenomegaly, no masses and bowel sounds normal  MS: no gross musculoskeletal defects noted, no edema  SKIN: no suspicious lesions or rashes  NEURO: Normal strength and tone, mentation intact and speech normal  PSYCH: mentation appears normal, affect normal/bright    Diagnostic Test Results:  Labs reviewed in Epic    ASSESSMENT / PLAN:   1. Encounter for Medicare annual wellness exam  - pap no longer indicated  - utd mammo  - next dexa 2022/2023  - colonoscopy utd (10 " years)  - she will get shingrix in the next year at pharmacy.    The 10-year ASCVD risk score (Eric LLANES Jr., et al., 2013) is: 5.3%    Values used to calculate the score:      Age: 66 years      Sex: Female      Is Non- : No      Diabetic: No      Tobacco smoker: No      Systolic Blood Pressure: 110 mmHg      Is BP treated: Yes      HDL Cholesterol: 59 mg/dL      Total Cholesterol: 194 mg/dL    2. Benign essential hypertension  BP Readings from Last 6 Encounters:   07/05/21 110/70   10/05/20 124/68   02/27/20 117/80   11/14/19 126/68   09/26/19 (!) 170/102   02/18/19 122/70     - Albumin Random Urine Quantitative with Creat Ratio  - lisinopril (ZESTRIL) 10 MG tablet; Take 1 tablet (10 mg) by mouth daily  Dispense: 90 tablet; Refill: 3  - Comprehensive metabolic panel (BMP + Alb, Alk Phos, ALT, AST, Total. Bili, TP)    3. Stage 3a chronic kidney disease  Stable - follow annually.    4. Migraine without aura and without status migrainosus, not intractable  Rare use.   - SUMAtriptan (IMITREX) 100 MG tablet; TAKE ONE TABLET BY MOUTH AT ONSET OF HEADACHE FOR MIGRAINE. MAY REPEAT IN 2 HOURS IF NEEDED. MAX OF 2 PER DAY  Dispense: 9 tablet; Refill: 3    5. Osteopenia of spine  Vitamin D Deficiency Screening Results:  Lab Results   Component Value Date    VITDT 72 10/05/2020   Dexa in 2022/2023  Stopped oscal supplementation with hypercalcemia.  - Vitamin D Deficiency    6. Diarrhea, unspecified type  ? If related to food.   Will check some basic labs.   If persistent consider CT, inflammatory markers.  - Comprehensive metabolic panel (BMP + Alb, Alk Phos, ALT, AST, Total. Bili, TP)  - CBC with platelets    Patient has been advised of split billing requirements and indicates understanding: No  COUNSELING:  Reviewed preventive health counseling, as reflected in patient instructions       Regular exercise       Healthy diet/nutrition       Vision screening       Immunizations       Osteoporosis  "prevention/bone health       Colon cancer screening    Estimated body mass index is 30.42 kg/m  as calculated from the following:    Height as of this encounter: 1.6 m (5' 3\").    Weight as of this encounter: 77.9 kg (171 lb 11.2 oz).        She reports that she has never smoked. She has never used smokeless tobacco.      Appropriate preventive services were discussed with this patient, including applicable screening as appropriate for cardiovascular disease, diabetes, osteopenia/osteoporosis, and glaucoma.  As appropriate for age/gender, discussed screening for colorectal cancer, prostate cancer, breast cancer, and cervical cancer. Checklist reviewing preventive services available has been given to the patient.    Reviewed patients plan of care and provided an AVS. The Basic Care Plan (routine screening as documented in Health Maintenance) for Marivel meets the Care Plan requirement. This Care Plan has been established and reviewed with the Patient.    Counseling Resources:  ATP IV Guidelines  Pooled Cohorts Equation Calculator  Breast Cancer Risk Calculator  Breast Cancer: Medication to Reduce Risk  FRAX Risk Assessment  ICSI Preventive Guidelines  Dietary Guidelines for Americans, 2010  USDA's MyPlate  ASA Prophylaxis  Lung CA Screening    Daniel Lao MD  [facility] [place]    Identified Health Risks:  "

## 2021-07-06 LAB
ALBUMIN SERPL-MCNC: 3.5 G/DL (ref 3.4–5)
ALP SERPL-CCNC: 146 U/L (ref 40–150)
ALT SERPL W P-5'-P-CCNC: 31 U/L (ref 0–50)
ANION GAP SERPL CALCULATED.3IONS-SCNC: 4 MMOL/L (ref 3–14)
AST SERPL W P-5'-P-CCNC: 17 U/L (ref 0–45)
BILIRUB SERPL-MCNC: 0.2 MG/DL (ref 0.2–1.3)
BUN SERPL-MCNC: 24 MG/DL (ref 7–30)
CALCIUM SERPL-MCNC: 9.2 MG/DL (ref 8.5–10.1)
CHLORIDE SERPL-SCNC: 109 MMOL/L (ref 94–109)
CO2 SERPL-SCNC: 28 MMOL/L (ref 20–32)
CREAT SERPL-MCNC: 1.08 MG/DL (ref 0.52–1.04)
CREAT UR-MCNC: 66 MG/DL
DEPRECATED CALCIDIOL+CALCIFEROL SERPL-MC: 57 UG/L (ref 20–75)
GFR SERPL CREATININE-BSD FRML MDRD: 54 ML/MIN/{1.73_M2}
GLUCOSE SERPL-MCNC: 89 MG/DL (ref 70–99)
MICROALBUMIN UR-MCNC: <5 MG/L
MICROALBUMIN/CREAT UR: NORMAL MG/G CR (ref 0–25)
POTASSIUM SERPL-SCNC: 4 MMOL/L (ref 3.4–5.3)
PROT SERPL-MCNC: 7.1 G/DL (ref 6.8–8.8)
SODIUM SERPL-SCNC: 141 MMOL/L (ref 133–144)

## 2021-09-04 ENCOUNTER — HEALTH MAINTENANCE LETTER (OUTPATIENT)
Age: 66
End: 2021-09-04

## 2022-06-27 ENCOUNTER — ANCILLARY PROCEDURE (OUTPATIENT)
Dept: MAMMOGRAPHY | Facility: CLINIC | Age: 67
End: 2022-06-27
Attending: INTERNAL MEDICINE
Payer: COMMERCIAL

## 2022-06-27 DIAGNOSIS — Z12.31 VISIT FOR SCREENING MAMMOGRAM: ICD-10-CM

## 2022-06-27 PROCEDURE — 77067 SCR MAMMO BI INCL CAD: CPT | Mod: TC | Performed by: RADIOLOGY

## 2022-08-01 DIAGNOSIS — I10 BENIGN ESSENTIAL HYPERTENSION: ICD-10-CM

## 2022-08-01 NOTE — TELEPHONE ENCOUNTER
From Pharmacy:  Pt has appt at end of the month but will be out of med by the end of the week.  Need refill.

## 2022-08-04 NOTE — TELEPHONE ENCOUNTER
Routing refill request to provider for review/approval because:  Labs out of range:  CR  Labs not current:  K  Outdated BP    Creatinine   Date Value Ref Range Status   07/05/2021 1.08 (H) 0.52 - 1.04 mg/dL Final     Lyla Blair RN on 8/4/2022 at 3:40 PM

## 2022-08-05 RX ORDER — LISINOPRIL 10 MG/1
10 TABLET ORAL DAILY
Qty: 90 TABLET | Refills: 0 | Status: SHIPPED | OUTPATIENT
Start: 2022-08-05 | End: 2022-08-24

## 2022-08-06 ENCOUNTER — HEALTH MAINTENANCE LETTER (OUTPATIENT)
Age: 67
End: 2022-08-06

## 2022-08-23 SDOH — ECONOMIC STABILITY: TRANSPORTATION INSECURITY
IN THE PAST 12 MONTHS, HAS LACK OF TRANSPORTATION KEPT YOU FROM MEETINGS, WORK, OR FROM GETTING THINGS NEEDED FOR DAILY LIVING?: NO

## 2022-08-23 SDOH — ECONOMIC STABILITY: TRANSPORTATION INSECURITY
IN THE PAST 12 MONTHS, HAS THE LACK OF TRANSPORTATION KEPT YOU FROM MEDICAL APPOINTMENTS OR FROM GETTING MEDICATIONS?: NO

## 2022-08-23 SDOH — HEALTH STABILITY: PHYSICAL HEALTH: ON AVERAGE, HOW MANY MINUTES DO YOU ENGAGE IN EXERCISE AT THIS LEVEL?: 40 MIN

## 2022-08-23 SDOH — ECONOMIC STABILITY: INCOME INSECURITY: IN THE LAST 12 MONTHS, WAS THERE A TIME WHEN YOU WERE NOT ABLE TO PAY THE MORTGAGE OR RENT ON TIME?: NO

## 2022-08-23 SDOH — ECONOMIC STABILITY: FOOD INSECURITY: WITHIN THE PAST 12 MONTHS, YOU WORRIED THAT YOUR FOOD WOULD RUN OUT BEFORE YOU GOT MONEY TO BUY MORE.: NEVER TRUE

## 2022-08-23 SDOH — HEALTH STABILITY: PHYSICAL HEALTH: ON AVERAGE, HOW MANY DAYS PER WEEK DO YOU ENGAGE IN MODERATE TO STRENUOUS EXERCISE (LIKE A BRISK WALK)?: 2 DAYS

## 2022-08-23 SDOH — ECONOMIC STABILITY: INCOME INSECURITY: HOW HARD IS IT FOR YOU TO PAY FOR THE VERY BASICS LIKE FOOD, HOUSING, MEDICAL CARE, AND HEATING?: NOT HARD AT ALL

## 2022-08-23 SDOH — ECONOMIC STABILITY: FOOD INSECURITY: WITHIN THE PAST 12 MONTHS, THE FOOD YOU BOUGHT JUST DIDN'T LAST AND YOU DIDN'T HAVE MONEY TO GET MORE.: NEVER TRUE

## 2022-08-23 ASSESSMENT — ENCOUNTER SYMPTOMS
DIARRHEA: 0
EYE PAIN: 0
SHORTNESS OF BREATH: 0
HEARTBURN: 0
JOINT SWELLING: 0
SORE THROAT: 0
BREAST MASS: 0
NERVOUS/ANXIOUS: 1
NAUSEA: 0
HEMATURIA: 0
FEVER: 0
PALPITATIONS: 0
WEAKNESS: 0
MYALGIAS: 0
COUGH: 0
FREQUENCY: 0
CONSTIPATION: 0
CHILLS: 0
HEADACHES: 0
DYSURIA: 0
PARESTHESIAS: 0
ARTHRALGIAS: 1
ABDOMINAL PAIN: 0
HEMATOCHEZIA: 0
DIZZINESS: 0

## 2022-08-23 ASSESSMENT — SOCIAL DETERMINANTS OF HEALTH (SDOH)
HOW OFTEN DO YOU ATTEND CHURCH OR RELIGIOUS SERVICES?: PATIENT DECLINED
DO YOU BELONG TO ANY CLUBS OR ORGANIZATIONS SUCH AS CHURCH GROUPS UNIONS, FRATERNAL OR ATHLETIC GROUPS, OR SCHOOL GROUPS?: NO
HOW OFTEN DO YOU GET TOGETHER WITH FRIENDS OR RELATIVES?: ONCE A WEEK
IN A TYPICAL WEEK, HOW MANY TIMES DO YOU TALK ON THE PHONE WITH FAMILY, FRIENDS, OR NEIGHBORS?: TWICE A WEEK

## 2022-08-23 ASSESSMENT — LIFESTYLE VARIABLES
HOW OFTEN DO YOU HAVE SIX OR MORE DRINKS ON ONE OCCASION: NEVER
SKIP TO QUESTIONS 9-10: 0
HOW MANY STANDARD DRINKS CONTAINING ALCOHOL DO YOU HAVE ON A TYPICAL DAY: 3 OR 4
AUDIT-C TOTAL SCORE: 4
HOW OFTEN DO YOU HAVE A DRINK CONTAINING ALCOHOL: 2-3 TIMES A WEEK

## 2022-08-23 ASSESSMENT — ACTIVITIES OF DAILY LIVING (ADL): CURRENT_FUNCTION: NO ASSISTANCE NEEDED

## 2022-08-24 ENCOUNTER — OFFICE VISIT (OUTPATIENT)
Dept: PEDIATRICS | Facility: CLINIC | Age: 67
End: 2022-08-24
Payer: COMMERCIAL

## 2022-08-24 VITALS
HEIGHT: 64 IN | SYSTOLIC BLOOD PRESSURE: 120 MMHG | RESPIRATION RATE: 16 BRPM | DIASTOLIC BLOOD PRESSURE: 70 MMHG | HEART RATE: 69 BPM | TEMPERATURE: 97.3 F | WEIGHT: 184.9 LBS | BODY MASS INDEX: 31.57 KG/M2 | OXYGEN SATURATION: 97 %

## 2022-08-24 DIAGNOSIS — Z00.00 ROUTINE HISTORY AND PHYSICAL EXAMINATION OF ADULT: Primary | ICD-10-CM

## 2022-08-24 DIAGNOSIS — Z13.220 SCREENING FOR HYPERLIPIDEMIA: ICD-10-CM

## 2022-08-24 DIAGNOSIS — N18.31 STAGE 3A CHRONIC KIDNEY DISEASE (H): ICD-10-CM

## 2022-08-24 DIAGNOSIS — D22.9 MULTIPLE PIGMENTED NEVI: ICD-10-CM

## 2022-08-24 DIAGNOSIS — F41.9 ANXIETY: ICD-10-CM

## 2022-08-24 DIAGNOSIS — I10 BENIGN ESSENTIAL HYPERTENSION: ICD-10-CM

## 2022-08-24 DIAGNOSIS — R10.13 EPIGASTRIC PAIN: ICD-10-CM

## 2022-08-24 DIAGNOSIS — R31.9 HEMATURIA, UNSPECIFIED TYPE: ICD-10-CM

## 2022-08-24 LAB
ANION GAP SERPL CALCULATED.3IONS-SCNC: 7 MMOL/L (ref 3–14)
BUN SERPL-MCNC: 21 MG/DL (ref 7–30)
CALCIUM SERPL-MCNC: 9.8 MG/DL (ref 8.5–10.1)
CHLORIDE BLD-SCNC: 108 MMOL/L (ref 94–109)
CHOLEST SERPL-MCNC: 197 MG/DL
CO2 SERPL-SCNC: 25 MMOL/L (ref 20–32)
CREAT SERPL-MCNC: 1.08 MG/DL (ref 0.52–1.04)
CREAT UR-MCNC: 96 MG/DL
FASTING STATUS PATIENT QL REPORTED: YES
GFR SERPL CREATININE-BSD FRML MDRD: 56 ML/MIN/1.73M2
GLUCOSE BLD-MCNC: 110 MG/DL (ref 70–99)
HDLC SERPL-MCNC: 52 MG/DL
HGB BLD-MCNC: 12.6 G/DL (ref 11.7–15.7)
LDLC SERPL CALC-MCNC: 101 MG/DL
MICROALBUMIN UR-MCNC: <5 MG/L
MICROALBUMIN/CREAT UR: NORMAL MG/G{CREAT}
NONHDLC SERPL-MCNC: 145 MG/DL
POTASSIUM BLD-SCNC: 4.5 MMOL/L (ref 3.4–5.3)
SODIUM SERPL-SCNC: 140 MMOL/L (ref 133–144)
TRIGL SERPL-MCNC: 221 MG/DL

## 2022-08-24 PROCEDURE — 82043 UR ALBUMIN QUANTITATIVE: CPT | Performed by: STUDENT IN AN ORGANIZED HEALTH CARE EDUCATION/TRAINING PROGRAM

## 2022-08-24 PROCEDURE — 36415 COLL VENOUS BLD VENIPUNCTURE: CPT | Performed by: STUDENT IN AN ORGANIZED HEALTH CARE EDUCATION/TRAINING PROGRAM

## 2022-08-24 PROCEDURE — 85018 HEMOGLOBIN: CPT | Performed by: STUDENT IN AN ORGANIZED HEALTH CARE EDUCATION/TRAINING PROGRAM

## 2022-08-24 PROCEDURE — 99214 OFFICE O/P EST MOD 30 MIN: CPT | Mod: 25 | Performed by: STUDENT IN AN ORGANIZED HEALTH CARE EDUCATION/TRAINING PROGRAM

## 2022-08-24 PROCEDURE — 80048 BASIC METABOLIC PNL TOTAL CA: CPT | Performed by: STUDENT IN AN ORGANIZED HEALTH CARE EDUCATION/TRAINING PROGRAM

## 2022-08-24 PROCEDURE — 80061 LIPID PANEL: CPT | Performed by: STUDENT IN AN ORGANIZED HEALTH CARE EDUCATION/TRAINING PROGRAM

## 2022-08-24 PROCEDURE — 99397 PER PM REEVAL EST PAT 65+ YR: CPT | Mod: GC | Performed by: STUDENT IN AN ORGANIZED HEALTH CARE EDUCATION/TRAINING PROGRAM

## 2022-08-24 RX ORDER — FAMOTIDINE 20 MG/1
20 TABLET, FILM COATED ORAL
Qty: 90 TABLET | Refills: 3 | Status: SHIPPED | OUTPATIENT
Start: 2022-08-24 | End: 2023-09-14

## 2022-08-24 RX ORDER — LISINOPRIL 10 MG/1
10 TABLET ORAL DAILY
Qty: 90 TABLET | Refills: 3 | Status: SHIPPED | OUTPATIENT
Start: 2022-08-24 | End: 2023-09-06

## 2022-08-24 ASSESSMENT — PAIN SCALES - GENERAL: PAINLEVEL: NO PAIN (0)

## 2022-08-24 ASSESSMENT — ENCOUNTER SYMPTOMS
NERVOUS/ANXIOUS: 1
MYALGIAS: 0
DYSURIA: 0
PALPITATIONS: 0
CONSTIPATION: 0
NAUSEA: 0
PARESTHESIAS: 0
CHILLS: 0
BREAST MASS: 0
DIARRHEA: 0
SHORTNESS OF BREATH: 0
FREQUENCY: 0
ARTHRALGIAS: 1
HEMATOCHEZIA: 0
DIZZINESS: 0
HEADACHES: 0
ABDOMINAL PAIN: 0
FEVER: 0
SORE THROAT: 0
HEARTBURN: 0
JOINT SWELLING: 0
WEAKNESS: 0
EYE PAIN: 0
HEMATURIA: 0
COUGH: 0

## 2022-08-24 ASSESSMENT — ACTIVITIES OF DAILY LIVING (ADL): CURRENT_FUNCTION: NO ASSISTANCE NEEDED

## 2022-08-24 NOTE — PROGRESS NOTES
"SUBJECTIVE:   Marivel Hernandez is a 67 year old female who presents for Preventive Visit.    Patient has been advised of split billing requirements and indicates understanding: Yes  Are you in the first 12 months of your Medicare coverage?  No    Has mole on right forearm is changing color for the past 3mo, not changing size. Has had it for the past 20yrs. No personal or family history of skin cancers.     Has had epigastric (stabby) abdominal pain, worse after eating (8hrs later). Mylanta is helpful. Happens once every few months, exacerbated by spicy and acidic foods. Notices hematuria the day after pain and also has diarrhea following this, then feels entirely normal between episodes. Denies heartburn, no dry cough. No dysuria, no vaginal bleeding.     Healthy Habits:     In general, how would you rate your overall health?  Excellent    Frequency of exercise:  2-3 days/week    Duration of exercise:  15-30 minutes    Do you usually eat at least 4 servings of fruit and vegetables a day, include whole grains    & fiber and avoid regularly eating high fat or \"junk\" foods?  Yes    Ability to successfully perform activities of daily living:  No assistance needed    Home Safety:  No safety concerns identified    Hearing Impairment:  No hearing concerns    In the past 6 months, have you been bothered by leaking of urine?  No    In general, how would you rate your overall mental or emotional health?  Good      PHQ-2 Total Score: 0    Additional concerns today:  Yes    Do you feel safe in your environment? Yes    Have you ever done Advance Care Planning? (For example, a Health Directive, POLST, or a discussion with a medical provider or your loved ones about your wishes): No, advance care planning information given to patient to review.  Patient declined advance care planning discussion at this time.    Fall risk  Fallen 2 or more times in the past year?: No  Any fall with injury in the past year?: No    Cognitive " Screening   1) Repeat 3 items (Leader, Season, Table)    2) Clock draw: NORMAL  3) 3 item recall: Recalls 1 object   Results: NORMAL clock, 1-2 items recalled: COGNITIVE IMPAIRMENT LESS LIKELY    Mini-CogTM Copyright NEPTALI Graham. Licensed by the author for use in [facility]; reprinted with permission (janes@[facility]). All rights reserved.      Do you have sleep apnea, excessive snoring or daytime drowsiness?: no    Reviewed and updated as needed this visit by clinical staff   Tobacco  Allergies  Meds   Med Hx  Surg Hx  Fam Hx          Social History     Tobacco Use     Smoking status: Never Smoker     Smokeless tobacco: Never Used   Substance Use Topics     Alcohol use: Yes     Comment: occasional     If you drink alcohol do you typically have >3 drinks per day or >7 drinks per week? No    Alcohol Use 8/24/2022   Prescreen: >3 drinks/day or >7 drinks/week? -   Prescreen: >3 drinks/day or >7 drinks/week? No     Current providers sharing in care for this patient include:   Patient Care Team:  Daniel Lao MD as PCP - General (Internal Medicine)  Daniel Lao MD as Assigned PCP    The following health maintenance items are reviewed in Epic and correct as of today:  Health Maintenance Due   Topic Date Due     ZOSTER IMMUNIZATION (1 of 2) Never done     LIPID  02/18/2020     COVID-19 Vaccine (4 - Booster for Pfizer series) 05/11/2022     BMP  07/05/2022     MICROALBUMIN  07/05/2022     Pneumococcal Vaccine: 65+ Years (2 - PPSV23 or PCV20) 07/05/2022     FHS-7:   Breast CA Risk Assessment (FHS-7) 6/27/2022 8/23/2022   Did any of your first-degree relatives have breast or ovarian cancer? Yes Yes   Did any of your relatives have bilateral breast cancer? No No   Did any man in your family have breast cancer? No No   Did any woman in your family have breast and ovarian cancer? No Yes   Did any woman in your family have breast cancer before age 50 y? No Yes   Do you have 2 or more  "relatives with breast and/or ovarian cancer? No No   Do you have 2 or more relatives with breast and/or bowel cancer? Yes No     Mammogram Screening: Recommended mammography every 1-2 years with patient discussion and risk factor consideration  Pertinent mammograms are reviewed under the imaging tab.    Review of Systems   Constitutional: Negative for chills and fever.   HENT: Negative for congestion, ear pain, hearing loss and sore throat.    Eyes: Positive for visual disturbance. Negative for pain.   Respiratory: Negative for cough and shortness of breath.    Cardiovascular: Negative for chest pain, palpitations and peripheral edema.   Gastrointestinal: Negative for abdominal pain, constipation, diarrhea, heartburn, hematochezia and nausea.   Breasts:  Negative for tenderness, breast mass and discharge.   Genitourinary: Negative for dysuria, frequency, genital sores, hematuria, pelvic pain, urgency, vaginal bleeding and vaginal discharge.   Musculoskeletal: Positive for arthralgias. Negative for joint swelling and myalgias.   Skin: Negative for rash.   Neurological: Negative for dizziness, weakness, headaches and paresthesias.   Psychiatric/Behavioral: Negative for mood changes. The patient is nervous/anxious.      OBJECTIVE:   /70   Pulse 69   Temp 97.3  F (36.3  C)   Resp 16   Ht 1.613 m (5' 3.5\")   Wt 83.9 kg (184 lb 14.4 oz)   SpO2 97%   BMI 32.24 kg/m   Estimated body mass index is 32.24 kg/m  as calculated from the following:    Height as of this encounter: 1.613 m (5' 3.5\").    Weight as of this encounter: 83.9 kg (184 lb 14.4 oz).     Physical Exam  GENERAL: healthy, alert and no distress  EYES: Eyes grossly normal to inspection, PERRL and conjunctivae and sclerae normal  HENT: ear canals and TM's normal, nose and mouth without ulcers or lesions  NECK: no adenopathy, no asymmetry, masses, or scars and thyroid normal to palpation  RESP: lungs clear to auscultation - no rales, rhonchi or " wheezes  CV: regular rate and rhythm, normal S1 S2, no S3 or S4, no murmur, click or rub, no peripheral edema and peripheral pulses strong  ABDOMEN: soft, nontender, no hepatosplenomegaly, no masses and bowel sounds normal  MS: no gross musculoskeletal defects noted, no edema  SKIN: no suspicious lesions or rashes, 5mm light brown raised mole on right forearm without concerning features  NEURO: Normal strength and tone, mentation intact and speech normal  PSYCH: mentation appears normal, affect normal/bright    ASSESSMENT / PLAN:   Marivel was seen today for physical.  Diagnoses and all orders for this visit:    Routine history and physical examination of adult - up to date with cancer screenings, no recent migraines. Will need colonoscopy in 2027, mammogram negative in June 2022.  -     UA Macro with Reflex to Micro and Culture - lab collect  -     Lipid panel reflex to direct LDL Non-fasting  -     BASIC METABOLIC PANEL  -     Albumin Random Urine Quantitative with Creat Ratio  -     Hemoglobin    Screening for hyperlipidemia  -     Lipid panel reflex to direct LDL Non-fasting    Benign essential hypertension - blood pressure well controlled on lisinopril, 120/70 today.   -     lisinopril (ZESTRIL) 10 MG tablet; Take 1 tablet (10 mg) by mouth daily  -     BASIC METABOLIC PANEL  -     Albumin Random Urine Quantitative with Creat Ratio    Anxiety - patient with anxiety when riding in the car such that she does not enjoy going out or spending time with her partner who enjoys driving. She feels that she assumes the worst will happen. Would like to talk to someone about this or about tools that she can use to redirect this anxiety.    -     Adult Mental Health  Referral; Future    Hematuria, unspecified type - very intermittent gross hematuria per patient, no clots, no dysuria. Will get UA to make sure that she does not have microhematuria, if present, would recommend urology referral.   -     UA Macro with  "Reflex to Micro and Culture - lab collect    Stage 3a chronic kidney disease (H)  -     Lipid panel reflex to direct LDL Non-fasting  -     BASIC METABOLIC PANEL  -     Albumin Random Urine Quantitative with Creat Ratio  -     Hemoglobin    Multiple pigmented nevi  -     Adult Dermatology Referral; Future    Epigastric pain - Epigastric pain after meals once every few months, described at stabbing. Not associated with other symptoms, worse with acidic or spicy foods. Favor gastritis or esophagitis, will trial pepcid PRN.   -     famotidine (PEPCID) 20 MG tablet; Take 1 tablet (20 mg) by mouth nightly as needed (for epigastric pain)    Patient has been advised of split billing requirements and indicates understanding: Yes    COUNSELING:  Reviewed preventive health counseling, as reflected in patient instructions       Regular exercise       Healthy diet/nutrition       Vision screening       Colon cancer screening    Estimated body mass index is 32.24 kg/m  as calculated from the following:    Height as of this encounter: 1.613 m (5' 3.5\").    Weight as of this encounter: 83.9 kg (184 lb 14.4 oz).    Weight management plan: Discussed healthy diet and exercise guidelines    She reports that she has never smoked. She has never used smokeless tobacco.    Appropriate preventive services were discussed with this patient, including applicable screening as appropriate for cardiovascular disease, diabetes, osteopenia/osteoporosis, and glaucoma.  As appropriate for age/gender, discussed screening for colorectal cancer, prostate cancer, breast cancer, and cervical cancer. Checklist reviewing preventive services available has been given to the patient.    Reviewed patients plan of care and provided an AVS. The Basic Care Plan (routine screening as documented in Health Maintenance) for Marivel meets the Care Plan requirement. This Care Plan has been established and reviewed with the Patient.    Counseling Resources:  ATP IV " Guidelines  Pooled Cohorts Equation Calculator  Breast Cancer Risk Calculator  Breast Cancer: Medication to Reduce Risk  FRAX Risk Assessment  ICSI Preventive Guidelines  Dietary Guidelines for Americans, 2010  USDA's MyPlate  ASA Prophylaxis  Lung CA Screening    Natacha Miles MD  Med/Peds, PGY-3  [facility]    I have seen the patient, discussed with the resident and agree with the history, physical and plan as documented above.    Katie Bates MD  Internal Medicine - Pediatrics

## 2022-10-22 ENCOUNTER — HEALTH MAINTENANCE LETTER (OUTPATIENT)
Age: 67
End: 2022-10-22

## 2022-12-13 ENCOUNTER — OFFICE VISIT (OUTPATIENT)
Dept: URGENT CARE | Facility: URGENT CARE | Age: 67
End: 2022-12-13
Payer: COMMERCIAL

## 2022-12-13 VITALS
TEMPERATURE: 98 F | SYSTOLIC BLOOD PRESSURE: 145 MMHG | OXYGEN SATURATION: 98 % | HEART RATE: 74 BPM | DIASTOLIC BLOOD PRESSURE: 78 MMHG | RESPIRATION RATE: 18 BRPM

## 2022-12-13 DIAGNOSIS — J39.2 THROAT MASS: Primary | ICD-10-CM

## 2022-12-13 DIAGNOSIS — J02.9 SORE THROAT: ICD-10-CM

## 2022-12-13 LAB
DEPRECATED S PYO AG THROAT QL EIA: NEGATIVE
GROUP A STREP BY PCR: NOT DETECTED

## 2022-12-13 PROCEDURE — 87651 STREP A DNA AMP PROBE: CPT | Performed by: PHYSICIAN ASSISTANT

## 2022-12-13 PROCEDURE — 99203 OFFICE O/P NEW LOW 30 MIN: CPT | Performed by: PHYSICIAN ASSISTANT

## 2022-12-13 NOTE — PROGRESS NOTES
Sore throat  - Streptococcus A Rapid Screen w/Reflex to PCR  - Group A Streptococcus PCR Throat Swab  - amoxicillin-clavulanate (AUGMENTIN) 875-125 MG tablet; Take 1 tablet by mouth 2 times daily for 10 days  - Adult ENT  Referral; Future    Throat mass  - amoxicillin-clavulanate (AUGMENTIN) 875-125 MG tablet; Take 1 tablet by mouth 2 times daily for 10 days  - Adult ENT  Referral; Future    Differential includes salivary stone, lipoma, cyst. I'd like the patient to take Augmentin and do salt water gargles for 10 days. Follow up with ENT if no improvement after that time.     LALIT Brandt [facility]    Subjective   67 year old who presents to clinic today for the following health issues:    Pharyngitis       HPI     Acute Illness  Acute illness concerns: Sore throat and white mass on the right side of her throat. No difficulty breathing. Slight difficulty swallowing.   Onset/Duration: 1 week  Symptoms:  Fever: No  Chills/Sweats: No  Headache (location?): No  Sinus Pressure: No  Conjunctivitis:  No  Ear Pain: no  Rhinorrhea: No  Congestion: No  Sore Throat: YES  Cough: no  Wheeze: No  Decreased Appetite: No  Nausea: No  Vomiting: No  Diarrhea: No  Dysuria/Freq.: No  Dysuria or Hematuria: No  Fatigue/Achiness: No  Sick/Strep Exposure: No  Therapies tried and outcome: None    Review of Systems   Review of Systems   See HPI    Objective    Temp: 98  F (36.7  C)   BP: (!) 145/78 Pulse: 74   Resp: 18 SpO2: 98 %       Physical Exam   Physical Exam  Constitutional:       General: She is not in acute distress.     Appearance: Normal appearance. She is not ill-appearing, toxic-appearing or diaphoretic.   HENT:      Head: Normocephalic and atraumatic.      Mouth/Throat:        Comments: Patient has a smooth, soft, white, round mass in the area shown above that is nontender to touch.  No surrounding erythema.  No bleeding or exudate.  Cardiovascular:      Rate and Rhythm: Normal  rate.      Pulses: Normal pulses.   Pulmonary:      Effort: Pulmonary effort is normal. No respiratory distress.   Neurological:      Mental Status: She is alert.   Psychiatric:         Mood and Affect: Mood normal.         Behavior: Behavior normal.         Thought Content: Thought content normal.         Judgment: Judgment normal.          Results for orders placed or performed in visit on 12/13/22 (from the past 24 hour(s))   Streptococcus A Rapid Screen w/Reflex to PCR    Specimen: Throat; Swab   Result Value Ref Range    Group A Strep antigen Negative Negative

## 2023-01-17 ENCOUNTER — OFFICE VISIT (OUTPATIENT)
Dept: OTOLARYNGOLOGY | Facility: CLINIC | Age: 68
End: 2023-01-17
Attending: PHYSICIAN ASSISTANT
Payer: COMMERCIAL

## 2023-01-17 DIAGNOSIS — Z00.00 ROUTINE HISTORY AND PHYSICAL EXAMINATION OF ADULT: ICD-10-CM

## 2023-01-17 DIAGNOSIS — R31.9 HEMATURIA, UNSPECIFIED TYPE: ICD-10-CM

## 2023-01-17 DIAGNOSIS — J35.8 MUCOCELE OF TONSIL: Primary | ICD-10-CM

## 2023-01-17 PROCEDURE — 42800 BIOPSY OF THROAT: CPT | Performed by: OTOLARYNGOLOGY

## 2023-01-17 PROCEDURE — 99203 OFFICE O/P NEW LOW 30 MIN: CPT | Mod: 25 | Performed by: OTOLARYNGOLOGY

## 2023-01-17 PROCEDURE — 88305 TISSUE EXAM BY PATHOLOGIST: CPT | Performed by: PATHOLOGY

## 2023-01-17 NOTE — LETTER
1/17/2023         RE: Marivel Hernandez  [address] [place] [address]        Dear Colleague,    Thank you for referring your patient, Marivel Hernandez, to the [facility]. Please see a copy of my visit note below.    HPI: This patient is a 66yo F who presents to clinic for evaluation of a tonsil mass at the request of Go Hunt PA-C. It has been present on the right side for a few months now. She has a history of mucus cysts in her mouth/throat that have come and gone. This one has not gone and has gotten large enough that she feels it most of the time. Denies fevers, unintentional weight loss, odynophagia, dysphagia, hemoptysis, voice changes, and shortness of breath.     Past medical history, surgical history, social history, family history, medications, and allergies have been reviewed with the patient and are documented above.    Review of Systems: a 10-system review was performed. Pertinent positives are noted in the HPI and on a separate scanned document in the chart.    PHYSICAL EXAMINATION:  GEN: no acute distress, normocephalic  EYES: extraocular movements are intact, pupils are equal and round. Sclera clear.   EARS: auricles are normally formed. The external auditory canals are clear with minimal to no cerumen. Tympanic membranes are intact bilaterally with no signs of infection, effusion, retractions, or perforations.  NOSE: anterior nares are patent. There are no masses or lesions. The septum is non-obstructing.  OC/OP: clear, dentition is in good repair. The tongue and palate are fully mobile and symmetric. The floor of mouth, base of tongue, and tonsils are soft and symmetric. The tonsils are 1+ bilaterally, the right tonsil is notable for a 1.5cm mucus cyst on the surface.  NECK: soft and supple. No lymphadenopathy or masses. Airway is midline.  NEURO: CN VII and XII symmetric. alert and oriented. No spontaneous nystagmus. Gait is normal.  PULM: breathing  comfortably on room air, normal chest expansion with respiration  CARDS: no cyanosis or clubbing, normal carotid pulses    PROCEDURE: Discussed unroofing the cyst so that she does not feel it anymore and she was on-board with this. Consent was obtained and scanned into the chart as a separate document. The right tonsil was anesthetized with topical cetacaine followed by 1% lidocaine with 1:100k epinephrine. A biopsy was taken without difficulty and about 2cc of mucus was expressed from the cyst. The patient tolerated the procedure well.     MEDICAL DECISION-MAKING: This patient is a 68yo F with a right tonsillar mucus cyst. It was unroofed today, the specimen will be sent for confirmation, but reassurance was already given that this will not be anything malignant. Will call her to confirm benign results when the pathology is available.         Again, thank you for allowing me to participate in the care of your patient.        Sincerely,        Kay Gee MD

## 2023-01-17 NOTE — PROGRESS NOTES
HPI: This patient is a 68yo F who presents to clinic for evaluation of a tonsil mass at the request of Go Hunt PA-C. It has been present on the right side for a few months now. She has a history of mucus cysts in her mouth/throat that have come and gone. This one has not gone and has gotten large enough that she feels it most of the time. Denies fevers, unintentional weight loss, odynophagia, dysphagia, hemoptysis, voice changes, and shortness of breath.     Past medical history, surgical history, social history, family history, medications, and allergies have been reviewed with the patient and are documented above.    Review of Systems: a 10-system review was performed. Pertinent positives are noted in the HPI and on a separate scanned document in the chart.    PHYSICAL EXAMINATION:  GEN: no acute distress, normocephalic  EYES: extraocular movements are intact, pupils are equal and round. Sclera clear.   EARS: auricles are normally formed. The external auditory canals are clear with minimal to no cerumen. Tympanic membranes are intact bilaterally with no signs of infection, effusion, retractions, or perforations.  NOSE: anterior nares are patent. There are no masses or lesions. The septum is non-obstructing.  OC/OP: clear, dentition is in good repair. The tongue and palate are fully mobile and symmetric. The floor of mouth, base of tongue, and tonsils are soft and symmetric. The tonsils are 1+ bilaterally, the right tonsil is notable for a 1.5cm mucus cyst on the surface.  NECK: soft and supple. No lymphadenopathy or masses. Airway is midline.  NEURO: CN VII and XII symmetric. alert and oriented. No spontaneous nystagmus. Gait is normal.  PULM: breathing comfortably on room air, normal chest expansion with respiration  CARDS: no cyanosis or clubbing, normal carotid pulses    PROCEDURE: Discussed unroofing the cyst so that she does not feel it anymore and she was on-board with this. Consent was obtained and  scanned into the chart as a separate document. The right tonsil was anesthetized with topical cetacaine followed by 1% lidocaine with 1:100k epinephrine. A biopsy was taken without difficulty and about 2cc of mucus was expressed from the cyst. The patient tolerated the procedure well.     MEDICAL DECISION-MAKING: This patient is a 66yo F with a right tonsillar mucus cyst. It was unroofed today, the specimen will be sent for confirmation, but reassurance was already given that this will not be anything malignant. Will call her to confirm benign results when the pathology is available.

## 2023-01-19 LAB
PATH REPORT.COMMENTS IMP SPEC: NORMAL
PATH REPORT.COMMENTS IMP SPEC: NORMAL
PATH REPORT.FINAL DX SPEC: NORMAL
PATH REPORT.GROSS SPEC: NORMAL
PATH REPORT.MICROSCOPIC SPEC OTHER STN: NORMAL
PATH REPORT.RELEVANT HX SPEC: NORMAL
PHOTO IMAGE: NORMAL

## 2023-01-24 ENCOUNTER — TELEPHONE (OUTPATIENT)
Dept: OTOLARYNGOLOGY | Facility: CLINIC | Age: 68
End: 2023-01-24
Payer: COMMERCIAL

## 2023-01-24 NOTE — TELEPHONE ENCOUNTER
Called pt and left a VM to call back about results.    [facility]      Alis Mcknight RN  [facility]  ENT  [address] [address]  [email or web address]   Office:[phone]  Employed by [facility]

## 2023-01-24 NOTE — TELEPHONE ENCOUNTER
----- Message from Kay Gee MD sent at 1/19/2023  6:53 PM CST -----  Can you please contact Marivel and confirm for her that the pathology from the biopsy the other day is basically normal, tonsillar tissue? No f/up needed. Thanks.

## 2023-02-01 NOTE — TELEPHONE ENCOUNTER
Spoke with Marivel and gave results listed below per Dr. Gee.  Pt expressed understating of results.    [facility]      Alis Mcknight RN  [facility]  ENT  [address] [address]  [email or web address]   Office:[phone]  Employed by [facility]

## 2023-06-01 ENCOUNTER — NURSE TRIAGE (OUTPATIENT)
Dept: PEDIATRICS | Facility: CLINIC | Age: 68
End: 2023-06-01
Payer: COMMERCIAL

## 2023-06-01 NOTE — TELEPHONE ENCOUNTER
"Nurse Triage SBAR    Is this a 2nd Level Triage? NO    Situation: Pt called with complaints of a cough starting one week ago.    Background: Pt reports being \"around someone who now has pneumonia\". Pt states she has tried resting, and was in bed for 2 days last weekend. Her cough has improved some. The cough disturbs her sleep, and she has tried mucinex and states it was not effective for her.     Assessment: Pt states she has a fullness in chest of phlegm that she can't loosen. Sometimes when she does cough up sputum it is yellow in color. Reports occasional runny nose. Reports no one else is sick at home. Cough worsens at night.     Denies SOB/difficulty breathing, chest pain, numbness/tingling, fever. Denies hx of cardiac disease, lung disease or blood clots.Denies traveling recently.     Protocol Recommended Disposition: Pt wants appointment next week.     Recommendation: Pt requesting appointment for next week, will attend if cough not approved- will cancel if feeling better. Will call back if symptoms worsen. Pt verbalizes understanding and agrees with plan.    Darian Carmichael RN on 6/1/2023 at 9:39 AM            "

## 2023-07-11 ENCOUNTER — ANCILLARY PROCEDURE (OUTPATIENT)
Dept: MAMMOGRAPHY | Facility: CLINIC | Age: 68
End: 2023-07-11
Attending: INTERNAL MEDICINE
Payer: COMMERCIAL

## 2023-07-11 DIAGNOSIS — Z12.31 VISIT FOR SCREENING MAMMOGRAM: ICD-10-CM

## 2023-07-11 PROCEDURE — 77067 SCR MAMMO BI INCL CAD: CPT | Mod: TC | Performed by: RADIOLOGY

## 2023-07-11 PROCEDURE — 77063 BREAST TOMOSYNTHESIS BI: CPT | Mod: TC | Performed by: RADIOLOGY

## 2023-08-20 ENCOUNTER — APPOINTMENT (OUTPATIENT)
Dept: ULTRASOUND IMAGING | Facility: CLINIC | Age: 68
DRG: 418 | End: 2023-08-20
Attending: EMERGENCY MEDICINE
Payer: COMMERCIAL

## 2023-08-20 ENCOUNTER — HOSPITAL ENCOUNTER (INPATIENT)
Facility: CLINIC | Age: 68
LOS: 6 days | Discharge: HOME | DRG: 418 | End: 2023-08-26
Attending: EMERGENCY MEDICINE | Admitting: INTERNAL MEDICINE
Payer: COMMERCIAL

## 2023-08-20 ENCOUNTER — APPOINTMENT (OUTPATIENT)
Dept: CT IMAGING | Facility: CLINIC | Age: 68
DRG: 418 | End: 2023-08-20
Attending: EMERGENCY MEDICINE
Payer: COMMERCIAL

## 2023-08-20 DIAGNOSIS — E87.20 LACTIC ACIDOSIS: ICD-10-CM

## 2023-08-20 DIAGNOSIS — K85.10 GALL STONE PANCREATITIS: ICD-10-CM

## 2023-08-20 LAB
ALBUMIN SERPL BCG-MCNC: 4.6 G/DL (ref 3.5–5.2)
ALBUMIN UR-MCNC: 30 MG/DL
ALP SERPL-CCNC: 294 U/L (ref 35–104)
ALT SERPL W P-5'-P-CCNC: 875 U/L (ref 0–50)
ANION GAP SERPL CALCULATED.3IONS-SCNC: 14 MMOL/L (ref 7–15)
APPEARANCE UR: CLEAR
AST SERPL W P-5'-P-CCNC: 901 U/L (ref 0–45)
BASOPHILS # BLD AUTO: 0.1 10E3/UL (ref 0–0.2)
BASOPHILS NFR BLD AUTO: 0 %
BILIRUB SERPL-MCNC: 2.4 MG/DL
BILIRUB UR QL STRIP: NEGATIVE
BUN SERPL-MCNC: 15.1 MG/DL (ref 8–23)
CALCIUM SERPL-MCNC: 10.4 MG/DL (ref 8.8–10.2)
CHLORIDE SERPL-SCNC: 101 MMOL/L (ref 98–107)
COLOR UR AUTO: YELLOW
CREAT SERPL-MCNC: 1.38 MG/DL (ref 0.51–0.95)
DEPRECATED HCO3 PLAS-SCNC: 25 MMOL/L (ref 22–29)
EOSINOPHIL # BLD AUTO: 0 10E3/UL (ref 0–0.7)
EOSINOPHIL NFR BLD AUTO: 0 %
ERYTHROCYTE [DISTWIDTH] IN BLOOD BY AUTOMATED COUNT: 13.2 % (ref 10–15)
GFR SERPL CREATININE-BSD FRML MDRD: 42 ML/MIN/1.73M2
GLUCOSE BLDC GLUCOMTR-MCNC: 122 MG/DL (ref 70–99)
GLUCOSE BLDC GLUCOMTR-MCNC: 132 MG/DL (ref 70–99)
GLUCOSE BLDC GLUCOMTR-MCNC: 135 MG/DL (ref 70–99)
GLUCOSE SERPL-MCNC: 250 MG/DL (ref 70–99)
GLUCOSE UR STRIP-MCNC: NEGATIVE MG/DL
HBA1C MFR BLD: 5.8 %
HCT VFR BLD AUTO: 46.5 % (ref 35–47)
HGB BLD-MCNC: 15.3 G/DL (ref 11.7–15.7)
HGB UR QL STRIP: ABNORMAL
IMM GRANULOCYTES # BLD: 0.1 10E3/UL
IMM GRANULOCYTES NFR BLD: 1 %
KETONES UR STRIP-MCNC: NEGATIVE MG/DL
LACTATE SERPL-SCNC: 2.8 MMOL/L (ref 0.7–2)
LACTATE SERPL-SCNC: 3.5 MMOL/L (ref 0.7–2)
LACTATE SERPL-SCNC: 4 MMOL/L (ref 0.7–2)
LEUKOCYTE ESTERASE UR QL STRIP: NEGATIVE
LIPASE SERPL-CCNC: >3000 U/L (ref 13–60)
LYMPHOCYTES # BLD AUTO: 1.6 10E3/UL (ref 0.8–5.3)
LYMPHOCYTES NFR BLD AUTO: 8 %
MCH RBC QN AUTO: 31 PG (ref 26.5–33)
MCHC RBC AUTO-ENTMCNC: 32.9 G/DL (ref 31.5–36.5)
MCV RBC AUTO: 94 FL (ref 78–100)
MONOCYTES # BLD AUTO: 0.9 10E3/UL (ref 0–1.3)
MONOCYTES NFR BLD AUTO: 4 %
NEUTROPHILS # BLD AUTO: 17 10E3/UL (ref 1.6–8.3)
NEUTROPHILS NFR BLD AUTO: 87 %
NITRATE UR QL: NEGATIVE
NRBC # BLD AUTO: 0 10E3/UL
NRBC BLD AUTO-RTO: 0 /100
PH UR STRIP: 5.5 [PH] (ref 5–7)
PLATELET # BLD AUTO: 327 10E3/UL (ref 150–450)
POTASSIUM SERPL-SCNC: 3.7 MMOL/L (ref 3.4–5.3)
PROT SERPL-MCNC: 7.8 G/DL (ref 6.4–8.3)
RBC # BLD AUTO: 4.94 10E6/UL (ref 3.8–5.2)
RBC URINE: 1 /HPF
SODIUM SERPL-SCNC: 140 MMOL/L (ref 136–145)
SP GR UR STRIP: 1.01 (ref 1–1.03)
SQUAMOUS EPITHELIAL: 1 /HPF
UROBILINOGEN UR STRIP-MCNC: NORMAL MG/DL
WBC # BLD AUTO: 19.7 10E3/UL (ref 4–11)
WBC URINE: 1 /HPF

## 2023-08-20 PROCEDURE — 250N000009 HC RX 250: Performed by: EMERGENCY MEDICINE

## 2023-08-20 PROCEDURE — 250N000011 HC RX IP 250 OP 636: Performed by: EMERGENCY MEDICINE

## 2023-08-20 PROCEDURE — 36415 COLL VENOUS BLD VENIPUNCTURE: CPT | Performed by: PHYSICIAN ASSISTANT

## 2023-08-20 PROCEDURE — 96365 THER/PROPH/DIAG IV INF INIT: CPT | Mod: 59

## 2023-08-20 PROCEDURE — 96375 TX/PRO/DX INJ NEW DRUG ADDON: CPT

## 2023-08-20 PROCEDURE — 99254 IP/OBS CNSLTJ NEW/EST MOD 60: CPT | Mod: 57 | Performed by: SURGERY

## 2023-08-20 PROCEDURE — 258N000003 HC RX IP 258 OP 636: Performed by: EMERGENCY MEDICINE

## 2023-08-20 PROCEDURE — 120N000001 HC R&B MED SURG/OB

## 2023-08-20 PROCEDURE — 85025 COMPLETE CBC W/AUTO DIFF WBC: CPT | Performed by: EMERGENCY MEDICINE

## 2023-08-20 PROCEDURE — 99222 1ST HOSP IP/OBS MODERATE 55: CPT | Mod: FS | Performed by: INTERNAL MEDICINE

## 2023-08-20 PROCEDURE — 99285 EMERGENCY DEPT VISIT HI MDM: CPT | Mod: 25

## 2023-08-20 PROCEDURE — 81001 URINALYSIS AUTO W/SCOPE: CPT | Performed by: PHYSICIAN ASSISTANT

## 2023-08-20 PROCEDURE — 83605 ASSAY OF LACTIC ACID: CPT | Performed by: EMERGENCY MEDICINE

## 2023-08-20 PROCEDURE — 83036 HEMOGLOBIN GLYCOSYLATED A1C: CPT | Performed by: PHYSICIAN ASSISTANT

## 2023-08-20 PROCEDURE — 96361 HYDRATE IV INFUSION ADD-ON: CPT

## 2023-08-20 PROCEDURE — 76705 ECHO EXAM OF ABDOMEN: CPT

## 2023-08-20 PROCEDURE — 87040 BLOOD CULTURE FOR BACTERIA: CPT | Performed by: PHYSICIAN ASSISTANT

## 2023-08-20 PROCEDURE — 96376 TX/PRO/DX INJ SAME DRUG ADON: CPT

## 2023-08-20 PROCEDURE — 74177 CT ABD & PELVIS W/CONTRAST: CPT

## 2023-08-20 PROCEDURE — 250N000011 HC RX IP 250 OP 636: Mod: JZ | Performed by: PHYSICIAN ASSISTANT

## 2023-08-20 PROCEDURE — 80053 COMPREHEN METABOLIC PANEL: CPT | Performed by: EMERGENCY MEDICINE

## 2023-08-20 PROCEDURE — 99207 PR APP CREDIT; MD BILLING SHARED VISIT: CPT | Performed by: PHYSICIAN ASSISTANT

## 2023-08-20 PROCEDURE — 83690 ASSAY OF LIPASE: CPT | Performed by: EMERGENCY MEDICINE

## 2023-08-20 PROCEDURE — 258N000003 HC RX IP 258 OP 636: Performed by: PHYSICIAN ASSISTANT

## 2023-08-20 PROCEDURE — 83605 ASSAY OF LACTIC ACID: CPT | Performed by: PHYSICIAN ASSISTANT

## 2023-08-20 PROCEDURE — 36415 COLL VENOUS BLD VENIPUNCTURE: CPT | Performed by: EMERGENCY MEDICINE

## 2023-08-20 RX ORDER — HYDROMORPHONE HCL IN WATER/PF 6 MG/30 ML
0.2 PATIENT CONTROLLED ANALGESIA SYRINGE INTRAVENOUS
Status: DISCONTINUED | OUTPATIENT
Start: 2023-08-20 | End: 2023-08-21

## 2023-08-20 RX ORDER — ACETAMINOPHEN 325 MG/1
650 TABLET ORAL EVERY 6 HOURS PRN
Status: DISCONTINUED | OUTPATIENT
Start: 2023-08-20 | End: 2023-08-26 | Stop reason: HOSPADM

## 2023-08-20 RX ORDER — HYDROMORPHONE HCL IN WATER/PF 6 MG/30 ML
0.4 PATIENT CONTROLLED ANALGESIA SYRINGE INTRAVENOUS
Status: DISCONTINUED | OUTPATIENT
Start: 2023-08-20 | End: 2023-08-21

## 2023-08-20 RX ORDER — LIDOCAINE 40 MG/G
CREAM TOPICAL
Status: DISCONTINUED | OUTPATIENT
Start: 2023-08-20 | End: 2023-08-22

## 2023-08-20 RX ORDER — SODIUM CHLORIDE, SODIUM LACTATE, POTASSIUM CHLORIDE, CALCIUM CHLORIDE 600; 310; 30; 20 MG/100ML; MG/100ML; MG/100ML; MG/100ML
INJECTION, SOLUTION INTRAVENOUS CONTINUOUS
Status: DISCONTINUED | OUTPATIENT
Start: 2023-08-20 | End: 2023-08-26

## 2023-08-20 RX ORDER — PIPERACILLIN SODIUM, TAZOBACTAM SODIUM 3; .375 G/15ML; G/15ML
3.38 INJECTION, POWDER, LYOPHILIZED, FOR SOLUTION INTRAVENOUS EVERY 6 HOURS
Status: DISCONTINUED | OUTPATIENT
Start: 2023-08-20 | End: 2023-08-25

## 2023-08-20 RX ORDER — OXYCODONE HYDROCHLORIDE 5 MG/1
5 TABLET ORAL EVERY 4 HOURS PRN
Status: DISCONTINUED | OUTPATIENT
Start: 2023-08-20 | End: 2023-08-22

## 2023-08-20 RX ORDER — NICOTINE POLACRILEX 4 MG
15-30 LOZENGE BUCCAL
Status: DISCONTINUED | OUTPATIENT
Start: 2023-08-20 | End: 2023-08-23

## 2023-08-20 RX ORDER — ONDANSETRON 4 MG/1
4 TABLET, ORALLY DISINTEGRATING ORAL EVERY 6 HOURS PRN
Status: DISCONTINUED | OUTPATIENT
Start: 2023-08-20 | End: 2023-08-26 | Stop reason: HOSPADM

## 2023-08-20 RX ORDER — HYDROMORPHONE HYDROCHLORIDE 1 MG/ML
0.5 INJECTION, SOLUTION INTRAMUSCULAR; INTRAVENOUS; SUBCUTANEOUS
Status: COMPLETED | OUTPATIENT
Start: 2023-08-20 | End: 2023-08-20

## 2023-08-20 RX ORDER — AMOXICILLIN 250 MG
2 CAPSULE ORAL 2 TIMES DAILY PRN
Status: DISCONTINUED | OUTPATIENT
Start: 2023-08-20 | End: 2023-08-22

## 2023-08-20 RX ORDER — ONDANSETRON 2 MG/ML
4 INJECTION INTRAMUSCULAR; INTRAVENOUS EVERY 6 HOURS PRN
Status: DISCONTINUED | OUTPATIENT
Start: 2023-08-20 | End: 2023-08-26 | Stop reason: HOSPADM

## 2023-08-20 RX ORDER — AMOXICILLIN 250 MG
1 CAPSULE ORAL 2 TIMES DAILY PRN
Status: DISCONTINUED | OUTPATIENT
Start: 2023-08-20 | End: 2023-08-22

## 2023-08-20 RX ORDER — HYDROMORPHONE HYDROCHLORIDE 1 MG/ML
0.5 INJECTION, SOLUTION INTRAMUSCULAR; INTRAVENOUS; SUBCUTANEOUS ONCE
Status: COMPLETED | OUTPATIENT
Start: 2023-08-20 | End: 2023-08-20

## 2023-08-20 RX ORDER — PIPERACILLIN SODIUM, TAZOBACTAM SODIUM 4; .5 G/20ML; G/20ML
4.5 INJECTION, POWDER, LYOPHILIZED, FOR SOLUTION INTRAVENOUS ONCE
Status: COMPLETED | OUTPATIENT
Start: 2023-08-20 | End: 2023-08-20

## 2023-08-20 RX ORDER — ONDANSETRON 2 MG/ML
4 INJECTION INTRAMUSCULAR; INTRAVENOUS ONCE
Status: COMPLETED | OUTPATIENT
Start: 2023-08-20 | End: 2023-08-20

## 2023-08-20 RX ORDER — NALOXONE HYDROCHLORIDE 0.4 MG/ML
0.4 INJECTION, SOLUTION INTRAMUSCULAR; INTRAVENOUS; SUBCUTANEOUS
Status: DISCONTINUED | OUTPATIENT
Start: 2023-08-20 | End: 2023-08-26 | Stop reason: HOSPADM

## 2023-08-20 RX ORDER — NALOXONE HYDROCHLORIDE 0.4 MG/ML
0.2 INJECTION, SOLUTION INTRAMUSCULAR; INTRAVENOUS; SUBCUTANEOUS
Status: DISCONTINUED | OUTPATIENT
Start: 2023-08-20 | End: 2023-08-26 | Stop reason: HOSPADM

## 2023-08-20 RX ORDER — IOPAMIDOL 755 MG/ML
500 INJECTION, SOLUTION INTRAVASCULAR ONCE
Status: COMPLETED | OUTPATIENT
Start: 2023-08-20 | End: 2023-08-20

## 2023-08-20 RX ORDER — DEXTROSE MONOHYDRATE 25 G/50ML
25-50 INJECTION, SOLUTION INTRAVENOUS
Status: DISCONTINUED | OUTPATIENT
Start: 2023-08-20 | End: 2023-08-23

## 2023-08-20 RX ADMIN — HYDROMORPHONE HYDROCHLORIDE 0.5 MG: 1 INJECTION, SOLUTION INTRAMUSCULAR; INTRAVENOUS; SUBCUTANEOUS at 12:00

## 2023-08-20 RX ADMIN — HYDROMORPHONE HYDROCHLORIDE 0.4 MG: 0.2 INJECTION, SOLUTION INTRAMUSCULAR; INTRAVENOUS; SUBCUTANEOUS at 20:52

## 2023-08-20 RX ADMIN — PIPERACILLIN AND TAZOBACTAM 4.5 G: 4; .5 INJECTION, POWDER, FOR SOLUTION INTRAVENOUS at 11:30

## 2023-08-20 RX ADMIN — ONDANSETRON 4 MG: 2 INJECTION INTRAMUSCULAR; INTRAVENOUS at 10:33

## 2023-08-20 RX ADMIN — IOPAMIDOL 92 ML: 755 INJECTION, SOLUTION INTRAVENOUS at 11:35

## 2023-08-20 RX ADMIN — HYDROMORPHONE HYDROCHLORIDE 0.5 MG: 1 INJECTION, SOLUTION INTRAMUSCULAR; INTRAVENOUS; SUBCUTANEOUS at 13:25

## 2023-08-20 RX ADMIN — SODIUM CHLORIDE, POTASSIUM CHLORIDE, SODIUM LACTATE AND CALCIUM CHLORIDE: 600; 310; 30; 20 INJECTION, SOLUTION INTRAVENOUS at 14:42

## 2023-08-20 RX ADMIN — PIPERACILLIN AND TAZOBACTAM 3.38 G: 3; .375 INJECTION, POWDER, FOR SOLUTION INTRAVENOUS at 17:56

## 2023-08-20 RX ADMIN — HYDROMORPHONE HYDROCHLORIDE 0.5 MG: 1 INJECTION, SOLUTION INTRAMUSCULAR; INTRAVENOUS; SUBCUTANEOUS at 15:47

## 2023-08-20 RX ADMIN — FAMOTIDINE 20 MG: 10 INJECTION, SOLUTION INTRAVENOUS at 21:01

## 2023-08-20 RX ADMIN — HYDROMORPHONE HYDROCHLORIDE 0.5 MG: 1 INJECTION, SOLUTION INTRAMUSCULAR; INTRAVENOUS; SUBCUTANEOUS at 10:33

## 2023-08-20 RX ADMIN — SODIUM CHLORIDE 63 ML: 9 INJECTION, SOLUTION INTRAVENOUS at 11:36

## 2023-08-20 RX ADMIN — SODIUM CHLORIDE, POTASSIUM CHLORIDE, SODIUM LACTATE AND CALCIUM CHLORIDE: 600; 310; 30; 20 INJECTION, SOLUTION INTRAVENOUS at 22:06

## 2023-08-20 RX ADMIN — HYDROMORPHONE HYDROCHLORIDE 0.4 MG: 0.2 INJECTION, SOLUTION INTRAMUSCULAR; INTRAVENOUS; SUBCUTANEOUS at 18:22

## 2023-08-20 RX ADMIN — PIPERACILLIN AND TAZOBACTAM 3.38 G: 3; .375 INJECTION, POWDER, FOR SOLUTION INTRAVENOUS at 22:54

## 2023-08-20 RX ADMIN — SODIUM CHLORIDE 1000 ML: 9 INJECTION, SOLUTION INTRAVENOUS at 10:33

## 2023-08-20 RX ADMIN — HYDROMORPHONE HYDROCHLORIDE 0.4 MG: 0.2 INJECTION, SOLUTION INTRAMUSCULAR; INTRAVENOUS; SUBCUTANEOUS at 22:49

## 2023-08-20 RX ADMIN — SODIUM CHLORIDE, POTASSIUM CHLORIDE, SODIUM LACTATE AND CALCIUM CHLORIDE 1000 ML: 600; 310; 30; 20 INJECTION, SOLUTION INTRAVENOUS at 12:01

## 2023-08-20 ASSESSMENT — ACTIVITIES OF DAILY LIVING (ADL)
ADLS_ACUITY_SCORE: 20
ADLS_ACUITY_SCORE: 35
ADLS_ACUITY_SCORE: 35
ADLS_ACUITY_SCORE: 20
ADLS_ACUITY_SCORE: 20

## 2023-08-20 NOTE — CONSULTS
General Surgery Consultation    Marivel Hernandez MRN# [id number]   Age: 68 year old YOB: 1955     Date of Admission:  8/20/2023    Reason for consult:            Abdominal pain, epigastric       Requesting physician:            Dr. Aburto                Assessment and Plan:   Assessment:   Marivel Hernandez is a 68 year old female with gallstone pancreatitis.     Comorbidities:   has a past medical history of Migraine, unspecified, without mention of intractable migraine without mention of status migrainosus, Sciatica, and Ulcerative colitis (H).      Plan:   I have offered the patient a laparoscopic cholecystectomy. We will wait until epigastric pain has subsided and labs have improved. It is not yet determined whether the common bile duct stone has passed, so she may need an ERCP prior to lap kimmy if bilirubin does not improve. Follow up labs and clinical exam tomorrow. NPO tonight.     We have discussed the indication, alternatives, risks and expected recovery for laparoscopic cholecystectomy.  Specifically we have discussed incisions, general anesthesia, potential postoperative infections, bleeding, open conversion, common bile duct injury, injury to intra-abdominal organs, adhesions leading to bowel obstruction, retained common bile duct stone, bile leak, DVT, PE, hernia, post cholecystectomy diarrhea, as well as expected recovery, postoperative dietary restrictions and physical limitations.  We have discussed the recommended interventions and treatments for complications.  All questions have been answered to the best of my ability.    She elects to proceed with surgery when pancreatitis has improved.       Irina Taylor MD           Chief Complaint:   Abdominal pain, epigastric     History is obtained from the patient.         History of Present Illness:   Marivel Hernandez is a 68 year old female who presents with epigastric region abdominal pain for the past 2 days.  The pain is constant.   She has had similar pain in the past, but much less severe, usually waking her from sleep at night over the past few months.  There is not an association with eating any type of food.  Positive for associated symptoms of vomiting, diarrhea, and chills.  She  does not have a history of jaundice or dark urine.  She  has not had pancreatitis in the past.              Past Medical History:   Migraine, unspecified, without mention of intractable migraine without mention of status migrainosus, Sciatica, and Ulcerative colitis (H).          Past Surgical History:     Past Surgical History:   Procedure Laterality Date    HYSTERECTOMY, AMY  2006    ZZC NONSPECIFIC PROCEDURE  1994    Tubal Ligation             Social History:     Social History     Tobacco Use    Smoking status: Never    Smokeless tobacco: Never   Substance Use Topics    Alcohol use: Yes     Comment: occasional             Family History:   Family history reviewed and is not pertinent.         Allergies:     Allergies   Allergen Reactions    Chocolate Headache    Dairy Digestive     No Known Allergies              Medications:   No current facility-administered medications on file prior to encounter.  famotidine (PEPCID) 20 MG tablet, Take 1 tablet (20 mg) by mouth nightly as needed (for epigastric pain)  lisinopril (ZESTRIL) 10 MG tablet, Take 1 tablet (10 mg) by mouth daily  multivitamin w/minerals (THERA-VIT-M) tablet, Take 1 tablet by mouth daily       famotidine  20 mg Intravenous Q12H    insulin aspart  1-6 Units Subcutaneous Q4H    piperacillin-tazobactam  3.375 g Intravenous Q6H    sodium chloride (PF)  3 mL Intracatheter Q8H            Review of Systems:   The 10 point review of systems is negative other than noted in the HPI.          Physical Exam:   BP (!) 146/88   Pulse 74   Temp 98  F (36.7  C) (Oral)   Resp 20   SpO2 95%   General - Well developed, well nourished female in no apparent distress  HEENT:  Head normocephalic and atraumatic,  pupils equal and round, conjunctivae clear, no scleral icterus, mucous membranes moist, external ears and nose normal  Neck: Supple without thyromegaly or masses  Lymphatic: No cervical, or supraclavicular lymphadenopathy  Lungs: Clear to auscultation bilaterally  Heart: regular rate and rhythm, no murmurs  Abdomen:   soft, flat, non-distended with moderate tenderness noted in the epigastric region. no masses palpated, no hepatosplenomegally  Extremities: Warm without edema  Neurologic: nonfocal  Psychiatric: Mood and affect appropriate  Skin: Without lesions, rashes, or juandice         Data:     WBC -   Lab Results   Component Value Date    WBC 19.7 (H) 08/20/2023       HgB -   Lab Results   Component Value Date    HGB 15.3 08/20/2023       Plt-   Lab Results   Component Value Date     08/20/2023       Liver Function Studies -   Recent Labs   Lab Test 08/20/23  1035   PROTTOTAL 7.8   ALBUMIN 4.6   BILITOTAL 2.4*   ALKPHOS 294*   *   *       Lipase-   Lab Results   Component Value Date    LIPASE >3,000 (H) 08/20/2023         Imaging:  All imaging studies reviewed by me.    Results for orders placed or performed during the hospital encounter of 08/20/23   Abdomen US, limited (RUQ only)    Narrative    EXAM: US ABDOMEN LIMITED  LOCATION: [facility]  DATE: 8/20/2023    INDICATION: epigastric abd pain  COMPARISON: None.  TECHNIQUE: Limited abdominal ultrasound.    FINDINGS:    GALLBLADDER: Gallstones. Mild gallbladder wall thickening. No pericholecystic fluid. Negative sonographic Hou sign.    BILE DUCTS: No biliary dilatation. The common duct measures 2.7 mm.    LIVER: Diffusely echogenic. No focal mass.    RIGHT KIDNEY: No hydronephrosis.    PANCREAS: The visualized portions are normal.    No ascites.      Impression    IMPRESSION:  1.  Fatty liver.  2.  Cholelithiasis with gallbladder wall thickening and negative sonographic Hou sign.       CT Abdomen Pelvis w  Contrast    Narrative    EXAM: CT ABDOMEN PELVIS W CONTRAST  LOCATION: [facility]  DATE: 8/20/2023    INDICATION: abdominal pain, diffuse, worse epigastrically, cholelithiasis on ultrasound, lactic >4.  COMPARISON: Ultrasound 08/20/2023 and CT 11/26/2010  TECHNIQUE: CT scan of the abdomen and pelvis was performed following injection of IV contrast. Multiplanar reformats were obtained. Dose reduction techniques were used.  CONTRAST: 92mL Isovue 370    FINDINGS:   LOWER CHEST: Fluid-filled lower thoracic esophagus and small hiatal hernia. Mild bibasilar atelectasis/scarring.    HEPATOBILIARY: Hepatic steatosis. Within liver segment 6 there is a 2.3 x 2.7 cm lesion, previously measuring 2.1 x 2.5 cm. It demonstrates discontinuous peripheral nodular enhancement. Cholelithiasis with mild nonspecific wall thickening. No biliary   ductal dilatation. No radiodense intraductal gallstone.    PANCREAS: Moderate diffuse peripancreatic edema with free fluid. No loculated fluid collection. No evidence for pancreatic necrosis.    SPLEEN: Normal.    ADRENAL GLANDS: Bilateral indeterminate adrenal nodules including a dominant 1.9 cm right adrenal nodule and 0.9 cm right adrenal nodule, new since the previous exam. 1.5 cm left adrenal nodule, previously measuring 0.9 cm.    KIDNEYS/BLADDER: Bilateral renal cysts including multiple left peripelvic renal cysts. No follow-up is indicated. No hydronephrosis or hydroureter. Normal bladder.    BOWEL: Normal caliber small bowel without inflammatory changes. Normal appendix. Unremarkable colon. No free air.    LYMPH NODES: Normal.    VASCULATURE: Minimal aortic atherosclerosis. Patent central SMA and SMV. Patent splenic vein.    PELVIC ORGANS: Hysterectomy. Unremarkable ovaries. No pelvic free fluid.    MUSCULOSKELETAL: Normal.      Impression    IMPRESSION:   1.  Moderate peripancreatic edema and free fluid consistent with acute interstitial edematous pancreatitis. No  evidence for pancreatic necrosis and no peripancreatic fluid collection.  2.  Hepatic steatosis.  3.  Cholelithiasis with nonspecific gallbladder wall thickening. No CT evidence of choledocholithiasis.  4.  Multiple bilateral indeterminate adrenal nodules favored to represent adenomas. Recommend nonemergent follow-up with adrenal CT.  5.  Liver segment 6 lesion most likely representing a cavernous hemangioma. It is slightly increased in size since 2010.       This note was created using voice recognition software. Undetected word substitutions or other errors may have occurred.     Time spent with the patient, reviewing the EMR, reviewing laboratory and imaging studies, more than 50% of which was counseling and coordinating care:  40 minutes.     Irina Taylor MD

## 2023-08-20 NOTE — H&P
[facility]  Internal Medicine  History and Physical      Patient Name: Marivel Hernandez MRN# [id number]   Age: 68 year old YOB: 1955     Date of Admission:8/20/2023    Primary care provider: Daniel Lao  Date of Service: 8/20/2023         Assessment and Plan:   Marivel Hernandez is a 68 year old female with a history of HTN, Migraine HA, CKD, Osteopenia who presents to the ED today with abdominal pain, nausea and emesis.    Acute Gallstone Pancreatitis  Concern for Sepsis - tachycardic, afebrile, wbc 19.7, lactic acid 4.0, lipase >3k, Tbili 2.4, alk phos 294, , .  Meeting sirs criteria with concern for sepsis from gallbladder.  Cholangitis appears less likely based on imaging.  ED discussed with GI and started on empiric Zosyn for now.    - CT abd/pelvis revealed hepatic steatosis, changes c/w acute pancreatitis with no evidence for pancreatic necrosis and no peripancreatic fluid collection.Cholelithiasis with nonspecific gallbladder wall thickening and no CT evidence of choledocholithiasis.  - abd U/S revealed cholelithiasis with gallbladder wall thickening and negative sonographic Hou sign  - GI and General Surgery consults  - continue IV Zosyn  - check blood cultures.  UA pending    LIZ on CKD - creatinine 1.38 up from baseline 1  - IVF hydration  - hold pta Lisinopril    HTN - hold Lisinopril due to LIZ    Hyperglycemia - glucose on admission 250.  Anion gap and bicarb wnl.  No prior hx of diabetes.  - check hgb A1C  - start ISS protocol    Incidental Findings - noted on CT imaging on admission:  - multiple bilateral indeterminate adrenal nodules favored to represent adenomas. Recommend nonemergent follow-up with adrenal CT.    - Liver segment 6 lesion most likely representing a cavernous hemangioma. It is slightly increased in size since 2010.      CODE: full  Diet/IVF: npo, LR  GI ppx:  pepcid  DVT ppx: SCD    Jessica Brian MS PA-C  Physician  Assistant   Hospitalist Service           Chief Complaint:   Abdominal Pain         HPI:   68 year old female with a history of HTN, Migraine HA, CKD, Osteopenia who presents to the ED today with abdominal pain, nausea and emesis.    Patient reports she developed an upset stomach yesterday mid afternoon.  She had chicken for dinner and did not feel well.  This morning, she awoke with a central upper abdominal pain and had nausea with non bloody emesis and diarrhea.         Past Medical History:     Past Medical History:   Diagnosis Date    Migraine, unspecified, without mention of intractable migraine without mention of status migrainosus     Sciatica     MRI 1999; L4L5 mild disc protrusion    Ulcerative colitis (H)           Past Surgical History:     Past Surgical History:   Procedure Laterality Date    HYSTERECTOMY, AMY  2006    ZZC NONSPECIFIC PROCEDURE  1994    Tubal Ligation          Social History:     Social History     Socioeconomic History    Marital status:      Spouse name: Darryn    Number of children: 2    Years of education: Not on file    Highest education level: Not on file   Occupational History    Occupation: supervisor at [employer]     Comment: [employer]   Tobacco Use    Smoking status: Never    Smokeless tobacco: Never   Vaping Use    Vaping Use: Never used   Substance and Sexual Activity    Alcohol use: Yes     Comment: occasional    Drug use: No    Sexual activity: Yes     Partners: Male     Birth control/protection: Post-menopausal   Other Topics Concern    Parent/sibling w/ CABG, MI or angioplasty before 65F 55M? Not Asked   Social History Narrative     1984; 2 boys; from [facility]     Social Determinants of Health     Financial Resource Strain: Low Risk  (8/23/2022)    Overall Financial Resource Strain (CARDIA)     Difficulty of Paying Living Expenses: Not hard at all   Food Insecurity: No Food Insecurity (8/23/2022)    Hunger Vital Sign     Worried About  Running Out of Food in the Last Year: Never true     Ran Out of Food in the Last Year: Never true   Transportation Needs: No Transportation Needs (8/23/2022)    PRAPARE - Transportation     Lack of Transportation (Medical): No     Lack of Transportation (Non-Medical): No   Physical Activity: Insufficiently Active (8/23/2022)    Exercise Vital Sign     Days of Exercise per Week: 2 days     Minutes of Exercise per Session: 40 min   Stress: No Stress Concern Present (8/23/2022)    [place] [employer] - Occupational Stress Questionnaire     Feeling of Stress : Only a little   Social Connections: Unknown (8/23/2022)    Social Connection and Isolation Panel [NHANES]     Frequency of Communication with Friends and Family: Twice a week     Frequency of Social Gatherings with Friends and Family: Once a week     Attends [employer] Services: Patient refused     Active Member of Clubs or Organizations: No     Attends Club or Organization Meetings: Not on file     Marital Status:    Intimate Partner Violence: Not on file   Housing Stability: Low Risk  (8/23/2022)    Housing Stability Vital Sign     Unable to Pay for Housing in the Last Year: No     Number of Places Lived in the Last Year: 1     Unstable Housing in the Last Year: No          Family History:     Family History   Problem Relation Age of Onset    Hypertension Mother     Cancer Mother         colon at age 90    Heart Disease Father 60        aneurysm    Breast Cancer Sister 47    Hypertension Maternal Grandmother     Diabetes Maternal Aunt         all 6 aunts (Mother's sisters)          Allergies:      Allergies   Allergen Reactions    Chocolate Headache    Dairy Digestive     No Known Allergies           Medications:     Prior to Admission medications    Medication Sig Last Dose Taking? Auth Provider Long Term End Date   famotidine (PEPCID) 20 MG tablet Take 1 tablet (20 mg) by mouth nightly as needed (for epigastric pain) 8/20/2023 Yes  Natacha Miles MD     lisinopril (ZESTRIL) 10 MG tablet Take 1 tablet (10 mg) by mouth daily 8/19/2023 Yes Katie Bates MD Yes    multivitamin w/minerals (THERA-VIT-M) tablet Take 1 tablet by mouth daily 8/19/2023 Yes Reported, Patient            Review of Systems:   A complete ROS was performed and is negative other than what is stated in the HPI.       Physical Exam:   Blood pressure 129/80, pulse 77, temperature 97.5  F (36.4  C), temperature source Oral, resp. rate 18, SpO2 96 %, not currently breastfeeding.  General: Alert, interactive, NAD  HEENT: AT/NC  Chest/Resp: clear to auscultation bilaterally, no crackles or wheezes  Heart/CV: regular rate and rhythm, no murmur  Abdomen/GI: Soft, RUQ and epigastric tenderness, nondistended. Decreased BS.  Extremities/MSK: No LE edema  Skin: Warm and dry  Neuro: Alert & oriented x 3, no focal deficits, moves all extremities equally         Labs:   ROUTINE ICU LABS (Last four results)  CMP  Recent Labs   Lab 08/20/23  1035      POTASSIUM 3.7   CHLORIDE 101   CO2 25   ANIONGAP 14   *   BUN 15.1   CR 1.38*   GFRESTIMATED 42*   AMBER 10.4*   PROTTOTAL 7.8   ALBUMIN 4.6   BILITOTAL 2.4*   ALKPHOS 294*   *   *     CBC  Recent Labs   Lab 08/20/23  1035   WBC 19.7*   RBC 4.94   HGB 15.3   HCT 46.5   MCV 94   MCH 31.0   MCHC 32.9   RDW 13.2        INRNo lab results found in last 7 days.  Arterial Blood GasNo lab results found in last 7 days.       Imaging/Procedures:     Results for orders placed or performed during the hospital encounter of 08/20/23   Abdomen US, limited (RUQ only)    Narrative    EXAM: US ABDOMEN LIMITED  LOCATION: [facility]  DATE: 8/20/2023    INDICATION: epigastric abd pain  COMPARISON: None.  TECHNIQUE: Limited abdominal ultrasound.    FINDINGS:    GALLBLADDER: Gallstones. Mild gallbladder wall thickening. No pericholecystic fluid. Negative sonographic Hou sign.    BILE DUCTS: No  biliary dilatation. The common duct measures 2.7 mm.    LIVER: Diffusely echogenic. No focal mass.    RIGHT KIDNEY: No hydronephrosis.    PANCREAS: The visualized portions are normal.    No ascites.      Impression    IMPRESSION:  1.  Fatty liver.  2.  Cholelithiasis with gallbladder wall thickening and negative sonographic Hou sign.       CT Abdomen Pelvis w Contrast    Narrative    EXAM: CT ABDOMEN PELVIS W CONTRAST  LOCATION: [facility]  DATE: 8/20/2023    INDICATION: abdominal pain, diffuse, worse epigastrically, cholelithiasis on ultrasound, lactic >4.  COMPARISON: Ultrasound 08/20/2023 and CT 11/26/2010  TECHNIQUE: CT scan of the abdomen and pelvis was performed following injection of IV contrast. Multiplanar reformats were obtained. Dose reduction techniques were used.  CONTRAST: 92mL Isovue 370    FINDINGS:   LOWER CHEST: Fluid-filled lower thoracic esophagus and small hiatal hernia. Mild bibasilar atelectasis/scarring.    HEPATOBILIARY: Hepatic steatosis. Within liver segment 6 there is a 2.3 x 2.7 cm lesion, previously measuring 2.1 x 2.5 cm. It demonstrates discontinuous peripheral nodular enhancement. Cholelithiasis with mild nonspecific wall thickening. No biliary   ductal dilatation. No radiodense intraductal gallstone.    PANCREAS: Moderate diffuse peripancreatic edema with free fluid. No loculated fluid collection. No evidence for pancreatic necrosis.    SPLEEN: Normal.    ADRENAL GLANDS: Bilateral indeterminate adrenal nodules including a dominant 1.9 cm right adrenal nodule and 0.9 cm right adrenal nodule, new since the previous exam. 1.5 cm left adrenal nodule, previously measuring 0.9 cm.    KIDNEYS/BLADDER: Bilateral renal cysts including multiple left peripelvic renal cysts. No follow-up is indicated. No hydronephrosis or hydroureter. Normal bladder.    BOWEL: Normal caliber small bowel without inflammatory changes. Normal appendix. Unremarkable colon. No free  air.    LYMPH NODES: Normal.    VASCULATURE: Minimal aortic atherosclerosis. Patent central SMA and SMV. Patent splenic vein.    PELVIC ORGANS: Hysterectomy. Unremarkable ovaries. No pelvic free fluid.    MUSCULOSKELETAL: Normal.      Impression    IMPRESSION:   1.  Moderate peripancreatic edema and free fluid consistent with acute interstitial edematous pancreatitis. No evidence for pancreatic necrosis and no peripancreatic fluid collection.  2.  Hepatic steatosis.  3.  Cholelithiasis with nonspecific gallbladder wall thickening. No CT evidence of choledocholithiasis.  4.  Multiple bilateral indeterminate adrenal nodules favored to represent adenomas. Recommend nonemergent follow-up with adrenal CT.  5.  Liver segment 6 lesion most likely representing a cavernous hemangioma. It is slightly increased in size since 2010.

## 2023-08-20 NOTE — PHARMACY-ADMISSION MEDICATION HISTORY
Pharmacist Admission Medication History    Admission medication history is complete. The information provided in this note is only as accurate as the sources available at the time of the update.    Medication reconciliation/reorder completed by provider prior to medication history? No    Information Source(s): Patient via in-person    Pertinent Information: none    Changes made to PTA medication list:  Added: None  Deleted: Fish oil, Sumatriptan 100mg  Changed: None    Medication Affordability:  Not including over the counter (OTC) medications, was there a time in the past 3 months when you did not take your medications as prescribed because of cost?: No    Allergies reviewed with patient and updates made in EHR: yes    Medication History Completed By: Kendrick Zuniga RPH 8/20/2023 12:10 PM    Prior to Admission medications    Medication Sig Last Dose Taking? Auth Provider Long Term End Date   famotidine (PEPCID) 20 MG tablet Take 1 tablet (20 mg) by mouth nightly as needed (for epigastric pain) 8/20/2023 Yes Natcaha Miles MD     lisinopril (ZESTRIL) 10 MG tablet Take 1 tablet (10 mg) by mouth daily 8/19/2023 Yes Katie Bates MD Yes    multivitamin w/minerals (THERA-VIT-M) tablet Take 1 tablet by mouth daily 8/19/2023 Yes Reported, Patient

## 2023-08-20 NOTE — ED PROVIDER NOTES
History     Chief Complaint:  Abdominal Pain     HPI   Marivel Hernandez is a 68 year old female with history of stage 3 CKD and hypertension presenting with nausea for the past day as well as 7-8 episodes of vomiting beginning around 4.5 hours ago and severe epigastric pain that is new for her. Patient notes two weeks of lower abdominal cramping and diarrhea that has been persistent. Denies hematemesis, bloody stool, or urinary symptoms. No history of abdominal surgery. Patient takes famotidine. She has not been drinking alcohol.     Independent Historian:   None - Patient Only    Review of External Notes:   none      Medications:    Famotidine  Lisinopril  Multivitamin with minerals  Sumatriptan     Past Medical History:    Migraine  Sciatica  UC  Hypertension  CKD, stage 3  Osteopenia of spine     Past Surgical History:    AMY  Tubal ligation      Physical Exam   Patient Vitals for the past 24 hrs:   BP Temp Temp src Pulse Resp SpO2   08/20/23 1100 (!) 110/98 97.5  F (36.4  C) Oral 94 -- 95 %   08/20/23 0942 116/83 -- -- 106 -- 92 %   08/20/23 0938 110/82 -- -- 115 18 93 %        Physical Exam  Nursing note and vitals reviewed.  HENT:   Mouth/Throat: Moist mucous membranes.   Eyes: EOMI, nonicteric sclera  Cardiovascular: Normal rate, regular rhythm, no murmurs, rubs, or gallops  Pulmonary/Chest: Effort normal and breath sounds normal. No respiratory distress. No wheezes. No rales.   Abdominal: Soft.  Diffuse tenderness to palpation, worse in the epigastric region, nondistended, no guarding or rigidity.   Musculoskeletal: Normal range of motion.   Neurological: Alert. Moves all extremities spontaneously.   Skin: Skin is warm and dry. No rash noted.         Emergency Department Course       Imaging:  CT Abdomen Pelvis w Contrast   Final Result   IMPRESSION:    1.  Moderate peripancreatic edema and free fluid consistent with acute interstitial edematous pancreatitis. No evidence for pancreatic necrosis and no  peripancreatic fluid collection.   2.  Hepatic steatosis.   3.  Cholelithiasis with nonspecific gallbladder wall thickening. No CT evidence of choledocholithiasis.   4.  Multiple bilateral indeterminate adrenal nodules favored to represent adenomas. Recommend nonemergent follow-up with adrenal CT.   5.  Liver segment 6 lesion most likely representing a cavernous hemangioma. It is slightly increased in size since 2010.      Abdomen US, limited (RUQ only)   Final Result   IMPRESSION:   1.  Fatty liver.   2.  Cholelithiasis with gallbladder wall thickening and negative sonographic Hou sign.               Report per radiology    Laboratory:  Labs Ordered and Resulted from Time of ED Arrival to Time of ED Departure   COMPREHENSIVE METABOLIC PANEL - Abnormal       Result Value    Sodium 140      Potassium 3.7      Chloride 101      Carbon Dioxide (CO2) 25      Anion Gap 14      Urea Nitrogen 15.1      Creatinine 1.38 (*)     Calcium 10.4 (*)     Glucose 250 (*)     Alkaline Phosphatase 294 (*)      (*)      (*)     Protein Total 7.8      Albumin 4.6      Bilirubin Total 2.4 (*)     GFR Estimate 42 (*)    LACTIC ACID WHOLE BLOOD - Abnormal    Lactic Acid 4.0 (*)    CBC WITH PLATELETS AND DIFFERENTIAL - Abnormal    WBC Count 19.7 (*)     RBC Count 4.94      Hemoglobin 15.3      Hematocrit 46.5      MCV 94      MCH 31.0      MCHC 32.9      RDW 13.2      Platelet Count 327      % Neutrophils 87      % Lymphocytes 8      % Monocytes 4      % Eosinophils 0      % Basophils 0      % Immature Granulocytes 1      NRBCs per 100 WBC 0      Absolute Neutrophils 17.0 (*)     Absolute Lymphocytes 1.6      Absolute Monocytes 0.9      Absolute Eosinophils 0.0      Absolute Basophils 0.1      Absolute Immature Granulocytes 0.1      Absolute NRBCs 0.0     LIPASE          Emergency Department Course & Assessments:       Interventions:  Medications   piperacillin-tazobactam (ZOSYN) 4.5 g vial to attach to  mL bag (4.5  g Intravenous $New Bag 8/20/23 1130)   ondansetron (ZOFRAN) injection 4 mg (4 mg Intravenous $Given 8/20/23 1033)   0.9% sodium chloride BOLUS (0 mLs Intravenous Stopped 8/20/23 1130)   HYDROmorphone (PF) (DILAUDID) injection 0.5 mg (0.5 mg Intravenous $Given 8/20/23 1033)   CT Scan Flush (63 mLs Intravenous $Given 8/20/23 1136)   iopamidol (ISOVUE-370) solution 500 mL (92 mLs Intravenous $Given 8/20/23 1135)      Assessments:  1103 I entered the patient's room and obtained history.  1214 I rechecked the patient and explained findings.    Independent Interpretation (X-rays, CTs, rhythm strip):  I independently reviewed the abdominal CT.  I visualize fat stranding around the pancreas consistent with pancreatitis as well as cholelithiasis.  No significant edema around the wall of the gallbladder.    Consultations/Discussion of Management or Tests:  1142    I consulted with SHONDA Young, regarding the patient's history and presentation here in the emergency department.    Case discussed with LALIT Manley, who accepts patient for admission.       Social Determinants of Health affecting care:   None    Disposition:  The patient was admitted to the hospital under the care of Dr. Ugalde.     Impression & Plan        Medical Decision Making:  Patient presents with chief complaint abdominal pain, worse in the epigastric region.  Broad differential considered including reflux, gastritis, pancreatitis, cholecystitis, cholelithiasis, bowel obstruction, among many other etiologies.  Labs notable initially for significant lactic acidosis as well as cholestasis.  This, in conjunction with significant leukocytosis was concerning for cholangitis therefore IV Zosyn initiated.  As work-up proceeded, it became clear that patient was suffering from pancreatitis given elevated lipase as well as significant fat stranding on CT imaging.  As patient has cholelithiasis as well in conjunction with LFT elevations, presentation is most  consistent with gallstone pancreatitis with likely choledocholithiasis.  Suspect pancreatitis is most likely reason for lactic acidosis as well as leukocytosis.  Less suspicious for septic shock at this time.  Case was discussed with gastroenterology, Dr. Blanco, who stated patient would likely need ERCP tomorrow.  I discussed the above with patient and answered all of her questions.  She agrees with plan for admission and future procedures likely to include ERCP and cholecystectomy.  We discussed that cholecystectomy may not occur while she is hospitalized.  Discussed case with hospitalist service, LALIT Manley, who accepts patient for admission.    Diagnosis:    ICD-10-CM    1. Gall stone pancreatitis  K85.10       2. Lactic acidosis  E87.20                Scribe Disclosure:  Priscila ROY, am serving as a scribe at 10:14 AM on 8/20/2023 to document services personally performed by Tuan Hayward MD based on my observations and the provider's statements to me.          Tuan Hayward MD  08/20/23 1452

## 2023-08-20 NOTE — H&P (VIEW-ONLY)
General Surgery Consultation    Marivel Hernandez MRN# [id number]   Age: 68 year old YOB: 1955     Date of Admission:  8/20/2023    Reason for consult:            Abdominal pain, epigastric       Requesting physician:            Dr. Aburto                Assessment and Plan:   Assessment:   Marivel Hernandez is a 68 year old female with gallstone pancreatitis.     Comorbidities:   has a past medical history of Migraine, unspecified, without mention of intractable migraine without mention of status migrainosus, Sciatica, and Ulcerative colitis (H).      Plan:   I have offered the patient a laparoscopic cholecystectomy. We will wait until epigastric pain has subsided and labs have improved. It is not yet determined whether the common bile duct stone has passed, so she may need an ERCP prior to lap kimmy if bilirubin does not improve. Follow up labs and clinical exam tomorrow. NPO tonight.     We have discussed the indication, alternatives, risks and expected recovery for laparoscopic cholecystectomy.  Specifically we have discussed incisions, general anesthesia, potential postoperative infections, bleeding, open conversion, common bile duct injury, injury to intra-abdominal organs, adhesions leading to bowel obstruction, retained common bile duct stone, bile leak, DVT, PE, hernia, post cholecystectomy diarrhea, as well as expected recovery, postoperative dietary restrictions and physical limitations.  We have discussed the recommended interventions and treatments for complications.  All questions have been answered to the best of my ability.    She elects to proceed with surgery when pancreatitis has improved.       Irina Taylor MD           Chief Complaint:   Abdominal pain, epigastric     History is obtained from the patient.         History of Present Illness:   Marivel Hernandez is a 68 year old female who presents with epigastric region abdominal pain for the past 2 days.  The pain is constant.   She has had similar pain in the past, but much less severe, usually waking her from sleep at night over the past few months.  There is not an association with eating any type of food.  Positive for associated symptoms of vomiting, diarrhea, and chills.  She  does not have a history of jaundice or dark urine.  She  has not had pancreatitis in the past.              Past Medical History:   Migraine, unspecified, without mention of intractable migraine without mention of status migrainosus, Sciatica, and Ulcerative colitis (H).          Past Surgical History:     Past Surgical History:   Procedure Laterality Date    HYSTERECTOMY, AMY  2006    ZZC NONSPECIFIC PROCEDURE  1994    Tubal Ligation             Social History:     Social History     Tobacco Use    Smoking status: Never    Smokeless tobacco: Never   Substance Use Topics    Alcohol use: Yes     Comment: occasional             Family History:   Family history reviewed and is not pertinent.         Allergies:     Allergies   Allergen Reactions    Chocolate Headache    Dairy Digestive     No Known Allergies              Medications:   No current facility-administered medications on file prior to encounter.  famotidine (PEPCID) 20 MG tablet, Take 1 tablet (20 mg) by mouth nightly as needed (for epigastric pain)  lisinopril (ZESTRIL) 10 MG tablet, Take 1 tablet (10 mg) by mouth daily  multivitamin w/minerals (THERA-VIT-M) tablet, Take 1 tablet by mouth daily       famotidine  20 mg Intravenous Q12H    insulin aspart  1-6 Units Subcutaneous Q4H    piperacillin-tazobactam  3.375 g Intravenous Q6H    sodium chloride (PF)  3 mL Intracatheter Q8H            Review of Systems:   The 10 point review of systems is negative other than noted in the HPI.          Physical Exam:   BP (!) 146/88   Pulse 74   Temp 98  F (36.7  C) (Oral)   Resp 20   SpO2 95%   General - Well developed, well nourished female in no apparent distress  HEENT:  Head normocephalic and atraumatic,  pupils equal and round, conjunctivae clear, no scleral icterus, mucous membranes moist, external ears and nose normal  Neck: Supple without thyromegaly or masses  Lymphatic: No cervical, or supraclavicular lymphadenopathy  Lungs: Clear to auscultation bilaterally  Heart: regular rate and rhythm, no murmurs  Abdomen:   soft, flat, non-distended with moderate tenderness noted in the epigastric region. no masses palpated, no hepatosplenomegally  Extremities: Warm without edema  Neurologic: nonfocal  Psychiatric: Mood and affect appropriate  Skin: Without lesions, rashes, or juandice         Data:     WBC -   Lab Results   Component Value Date    WBC 19.7 (H) 08/20/2023       HgB -   Lab Results   Component Value Date    HGB 15.3 08/20/2023       Plt-   Lab Results   Component Value Date     08/20/2023       Liver Function Studies -   Recent Labs   Lab Test 08/20/23  1035   PROTTOTAL 7.8   ALBUMIN 4.6   BILITOTAL 2.4*   ALKPHOS 294*   *   *       Lipase-   Lab Results   Component Value Date    LIPASE >3,000 (H) 08/20/2023         Imaging:  All imaging studies reviewed by me.    Results for orders placed or performed during the hospital encounter of 08/20/23   Abdomen US, limited (RUQ only)    Narrative    EXAM: US ABDOMEN LIMITED  LOCATION: [facility]  DATE: 8/20/2023    INDICATION: epigastric abd pain  COMPARISON: None.  TECHNIQUE: Limited abdominal ultrasound.    FINDINGS:    GALLBLADDER: Gallstones. Mild gallbladder wall thickening. No pericholecystic fluid. Negative sonographic Hou sign.    BILE DUCTS: No biliary dilatation. The common duct measures 2.7 mm.    LIVER: Diffusely echogenic. No focal mass.    RIGHT KIDNEY: No hydronephrosis.    PANCREAS: The visualized portions are normal.    No ascites.      Impression    IMPRESSION:  1.  Fatty liver.  2.  Cholelithiasis with gallbladder wall thickening and negative sonographic Hou sign.       CT Abdomen Pelvis w  Contrast    Narrative    EXAM: CT ABDOMEN PELVIS W CONTRAST  LOCATION: [facility]  DATE: 8/20/2023    INDICATION: abdominal pain, diffuse, worse epigastrically, cholelithiasis on ultrasound, lactic >4.  COMPARISON: Ultrasound 08/20/2023 and CT 11/26/2010  TECHNIQUE: CT scan of the abdomen and pelvis was performed following injection of IV contrast. Multiplanar reformats were obtained. Dose reduction techniques were used.  CONTRAST: 92mL Isovue 370    FINDINGS:   LOWER CHEST: Fluid-filled lower thoracic esophagus and small hiatal hernia. Mild bibasilar atelectasis/scarring.    HEPATOBILIARY: Hepatic steatosis. Within liver segment 6 there is a 2.3 x 2.7 cm lesion, previously measuring 2.1 x 2.5 cm. It demonstrates discontinuous peripheral nodular enhancement. Cholelithiasis with mild nonspecific wall thickening. No biliary   ductal dilatation. No radiodense intraductal gallstone.    PANCREAS: Moderate diffuse peripancreatic edema with free fluid. No loculated fluid collection. No evidence for pancreatic necrosis.    SPLEEN: Normal.    ADRENAL GLANDS: Bilateral indeterminate adrenal nodules including a dominant 1.9 cm right adrenal nodule and 0.9 cm right adrenal nodule, new since the previous exam. 1.5 cm left adrenal nodule, previously measuring 0.9 cm.    KIDNEYS/BLADDER: Bilateral renal cysts including multiple left peripelvic renal cysts. No follow-up is indicated. No hydronephrosis or hydroureter. Normal bladder.    BOWEL: Normal caliber small bowel without inflammatory changes. Normal appendix. Unremarkable colon. No free air.    LYMPH NODES: Normal.    VASCULATURE: Minimal aortic atherosclerosis. Patent central SMA and SMV. Patent splenic vein.    PELVIC ORGANS: Hysterectomy. Unremarkable ovaries. No pelvic free fluid.    MUSCULOSKELETAL: Normal.      Impression    IMPRESSION:   1.  Moderate peripancreatic edema and free fluid consistent with acute interstitial edematous pancreatitis. No  evidence for pancreatic necrosis and no peripancreatic fluid collection.  2.  Hepatic steatosis.  3.  Cholelithiasis with nonspecific gallbladder wall thickening. No CT evidence of choledocholithiasis.  4.  Multiple bilateral indeterminate adrenal nodules favored to represent adenomas. Recommend nonemergent follow-up with adrenal CT.  5.  Liver segment 6 lesion most likely representing a cavernous hemangioma. It is slightly increased in size since 2010.       This note was created using voice recognition software. Undetected word substitutions or other errors may have occurred.     Time spent with the patient, reviewing the EMR, reviewing laboratory and imaging studies, more than 50% of which was counseling and coordinating care:  40 minutes.     Irina Taylor MD

## 2023-08-20 NOTE — ED TRIAGE NOTES
Pt presents to the ED with epigastric pain 9/10 starting yesterday. Pt has nausea and vomiting with the abdominal pain as well. Pain 9/10.     Triage Assessment       Row Name 08/20/23 0936       Triage Assessment (Adult)    Airway WDL WDL       Respiratory WDL    Respiratory WDL WDL       Skin Circulation/Temperature WDL    Skin Circulation/Temperature WDL X  pale, flushed       Cardiac WDL    Cardiac WDL X;rhythm    Pulse Rate & Regularity tachycardic       Peripheral/Neurovascular WDL    Peripheral Neurovascular WDL WDL       Cognitive/Neuro/Behavioral WDL    Cognitive/Neuro/Behavioral WDL WDL

## 2023-08-20 NOTE — CONSULTS
Gastroenterology Consultation    Patient: Marivel Hernandez   1955  Date of Consult:  8/20/2023  Admission Date/Time: 8/20/2023  9:38 AM  Primary Care Provider:  Daniel Lao    I was asked to see this patient in consultation by Dr. Casillas for evaluation of pancreatitis.    HPI:  Marivel Hernanedz is a 68 year old female who has been having intermittent abdominal pain, approximately twice a month for the past year.  She notes that over the past few months these episodes have gotten somewhat more severe.    Yesterday afternoon she was having epigastric pain.  She took Pepcid and Mylanta with minimal improvement.  She then ate Chipotle, and had worsening of her symptoms.  At 7 AM this morning she had severe abdominal pain, 10 out of 10 in intensity.  This was associated with nonbloody vomiting and diarrhea.  She denies any fevers, although she did have sweats while she was vomiting.    Currently she is improved, she states her pain is 6 out of 10.    PAST MEDICAL HISTORY:  Past Medical History:   Diagnosis Date    Migraine, unspecified, without mention of intractable migraine without mention of status migrainosus     Sciatica     MRI 1999; L4L5 mild disc protrusion    Ulcerative colitis (H)        PAST SURGICAL HISTORY:  Past Surgical History:   Procedure Laterality Date    HYSTERECTOMY, AMY  2006    [facility] NONSPECIFIC PROCEDURE  1994    Tubal Ligation       MEDICATIONS:   :   Prior to Admission Medications   Prescriptions Last Dose Informant Patient Reported? Taking?   famotidine (PEPCID) 20 MG tablet 8/20/2023  No Yes   Sig: Take 1 tablet (20 mg) by mouth nightly as needed (for epigastric pain)   lisinopril (ZESTRIL) 10 MG tablet 8/19/2023  No Yes   Sig: Take 1 tablet (10 mg) by mouth daily   multivitamin w/minerals (THERA-VIT-M) tablet 8/19/2023  Yes Yes   Sig: Take 1 tablet by mouth daily      Facility-Administered Medications: None       ALLERGIES:   :   Allergies   Allergen Reactions    Chocolate  Headache    Dairy Digestive     No Known Allergies        SOCIAL HISTORY:  Social History     Tobacco Use    Smoking status: Never    Smokeless tobacco: Never   Vaping Use    Vaping Use: Never used   Substance Use Topics    Alcohol use: Yes     Comment: occasional    Drug use: No       FAMILY HISTORY:  Mother with colon cancer diagnosed age 75.  No other first degree relative with colon cancer, gastric cancer, crohn's disease, ulcerative colitis, or liver disease.     EXAM:  General Appearance: Alert, oriented x3, no acute distress.  BP (!) 146/88   Pulse 74   Temp 98  F (36.7  C) (Oral)   Resp 20   SpO2 95%   Eye: sclera anicteric.  ENT: oropharynx clear, no thrush.  CV: RRR.  Resp: CTA bilaterally.  GI: Soft, NABS, moderate upper abdominal tenderness without rebound.  Musculoskeletal: no joint swelling, erythema, or warmth.  Neuro: no asterixis.      Labs and imaging reviewed    Recent Labs   Lab Test 08/20/23  1035 08/24/22  0854 07/05/21  1705 02/27/20  1702   WBC 19.7*  --  8.1 7.3   HGB 15.3 12.6 11.8 12.3   MCV 94  --  97 94     --  219 216     Recent Labs   Lab Test 08/20/23  1035 08/24/22  0854 07/05/21  1705   POTASSIUM 3.7 4.5 4.0   CHLORIDE 101 108 109   CO2 25 25 28   BUN 15.1 21 24   ANIONGAP 14 7 4     Recent Labs   Lab Test 08/20/23  1035 07/05/21  1705 02/27/20  1702   ALBUMIN 4.6 3.5 3.8   BILITOTAL 2.4* 0.2 0.3   * 31 30   * 17 15   LIPASE >3,000*  --   --        ASSESSMENT AND PLAN: This a pleasant 68-year-old with pancreatitis.  Given her elevation in LFTs, this most likely represents gallstone pancreatitis.  Agree with conservative care with aggressive IV fluids, pain management, and n.p.o.    I discussed her case with Dr. Winslow, who is biliary coverage.  No need for urgent ERCP.  We will repeat her LFTs in the morning, and reassess for the need for intervention at that time.    50 minutes of total time was spent providing patient care, including patient evaluation,  reviewing documentation/test results, and .          Wilian Blanco MD  Thank you for the opportunity to participate in the care of this patient.   Please feel free to call with any questions or concerns.  [phone].       CC: [facility], Daniel Lao

## 2023-08-20 NOTE — PLAN OF CARE
Goal Outcome Evaluation:      Plan of Care Reviewed With: patient      Pt. A&O, SBA for mobility, has abdominal pain got PRN IV Dilaudid, NPO for surgery, on  M,L/HR continuing fluids, CMS intact, voided adequately.    BP (!) 146/88   Pulse 74   Temp 98  F (36.7  C) (Oral)   Resp 20   SpO2 95%

## 2023-08-20 NOTE — ED NOTES
[facility]  ED Nurse Handoff Report    ED Chief complaint: Abdominal Pain  . ED Diagnosis:   Final diagnoses:   None       Allergies:   Allergies   Allergen Reactions    Chocolate Headache    Dairy Digestive     No Known Allergies        Code Status: Full Code    Activity level - Baseline/Home:  independent.  Activity Level - Current:   standby.   Lift room needed: No.   Bariatric: No   Needed: No   Isolation: No.   Infection: Not Applicable.     Respiratory status: Room air    Vital Signs (within 30 minutes):   Vitals:    08/20/23 0938 08/20/23 0942 08/20/23 1100   BP: 110/82 116/83 (!) 110/98   Pulse: 115 106 94   Resp: 18     Temp:   97.5  F (36.4  C)   TempSrc:   Oral   SpO2: 93% 92% 95%       Cardiac Rhythm:  ,      Pain level:    Patient confused: No.   Patient Falls Risk: patient and family education, activity supervised, and mobility aid in reach.   Elimination Status: Has voided     Patient Report - Initial Complaint: Abdominal pain.   Focused Assessment: 68 year old female with history of stage 3 CKD and hypertension presenting with nausea for the past day as well as 7-8 episodes of vomiting beginning around 4.5 hours ago and severe epigastric pain that is new for her. Patient notes two weeks of lower abdominal cramping and diarrhea that has been persistent. Denies hematemesis, bloody stool, or urinary symptoms. No history of abdominal surgery. Patient takes famotidine. She has not been drinking alcohol.       Abnormal Results:   Labs Ordered and Resulted from Time of ED Arrival to Time of ED Departure   COMPREHENSIVE METABOLIC PANEL - Abnormal       Result Value    Sodium 140      Potassium 3.7      Chloride 101      Carbon Dioxide (CO2) 25      Anion Gap 14      Urea Nitrogen 15.1      Creatinine 1.38 (*)     Calcium 10.4 (*)     Glucose 250 (*)     Alkaline Phosphatase 294 (*)      (*)      (*)     Protein Total 7.8      Albumin 4.6      Bilirubin Total  2.4 (*)     GFR Estimate 42 (*)    LACTIC ACID WHOLE BLOOD - Abnormal    Lactic Acid 4.0 (*)    CBC WITH PLATELETS AND DIFFERENTIAL - Abnormal    WBC Count 19.7 (*)     RBC Count 4.94      Hemoglobin 15.3      Hematocrit 46.5      MCV 94      MCH 31.0      MCHC 32.9      RDW 13.2      Platelet Count 327      % Neutrophils 87      % Lymphocytes 8      % Monocytes 4      % Eosinophils 0      % Basophils 0      % Immature Granulocytes 1      NRBCs per 100 WBC 0      Absolute Neutrophils 17.0 (*)     Absolute Lymphocytes 1.6      Absolute Monocytes 0.9      Absolute Eosinophils 0.0      Absolute Basophils 0.1      Absolute Immature Granulocytes 0.1      Absolute NRBCs 0.0     LIPASE        Abdomen US, limited (RUQ only)   Final Result   IMPRESSION:   1.  Fatty liver.   2.  Cholelithiasis with gallbladder wall thickening and negative sonographic Hou sign.            CT Abdomen Pelvis w Contrast    (Results Pending)       Treatments provided: see chart  Family Comments:  at bedside  OBS brochure/video discussed/provided to patient:  N/A  ED Medications:   Medications   piperacillin-tazobactam (ZOSYN) 4.5 g vial to attach to  mL bag (has no administration in time range)   ondansetron (ZOFRAN) injection 4 mg (4 mg Intravenous $Given 8/20/23 1033)   0.9% sodium chloride BOLUS (1,000 mLs Intravenous $New Bag 8/20/23 1033)   HYDROmorphone (PF) (DILAUDID) injection 0.5 mg (0.5 mg Intravenous $Given 8/20/23 1033)       Drips infusing:  No  For the majority of the shift this patient was Green.   Interventions performed were n/a.    Sepsis treatment initiated: No    Cares/treatment/interventions/medications to be completed following ED care: see inpatient orders    ED Nurse Name: Juana Hanna RN  11:27 AM   RECEIVING UNIT ED HANDOFF REVIEW    Above ED Nurse Handoff Report was reviewed: Yes  Reviewed by: Lauren Freeman RN on August 20, 2023 at 1:47 PM

## 2023-08-21 ENCOUNTER — APPOINTMENT (OUTPATIENT)
Dept: GENERAL RADIOLOGY | Facility: CLINIC | Age: 68
DRG: 418 | End: 2023-08-21
Attending: INTERNAL MEDICINE
Payer: COMMERCIAL

## 2023-08-21 ENCOUNTER — ANESTHESIA EVENT (OUTPATIENT)
Dept: SURGERY | Facility: CLINIC | Age: 68
DRG: 418 | End: 2023-08-21
Payer: COMMERCIAL

## 2023-08-21 ENCOUNTER — ANESTHESIA (OUTPATIENT)
Dept: SURGERY | Facility: CLINIC | Age: 68
DRG: 418 | End: 2023-08-21
Payer: COMMERCIAL

## 2023-08-21 ENCOUNTER — APPOINTMENT (OUTPATIENT)
Dept: SURGERY | Facility: PHYSICIAN GROUP | Age: 68
End: 2023-08-21
Payer: COMMERCIAL

## 2023-08-21 LAB
ALBUMIN SERPL BCG-MCNC: 3.5 G/DL (ref 3.5–5.2)
ALP SERPL-CCNC: 195 U/L (ref 35–104)
ALT SERPL W P-5'-P-CCNC: 455 U/L (ref 0–50)
ANION GAP SERPL CALCULATED.3IONS-SCNC: 9 MMOL/L (ref 7–15)
AST SERPL W P-5'-P-CCNC: 207 U/L (ref 0–45)
BILIRUB DIRECT SERPL-MCNC: 0.31 MG/DL (ref 0–0.3)
BILIRUB SERPL-MCNC: 0.9 MG/DL
BUN SERPL-MCNC: 15.9 MG/DL (ref 8–23)
CALCIUM SERPL-MCNC: 8.7 MG/DL (ref 8.8–10.2)
CHLORIDE SERPL-SCNC: 108 MMOL/L (ref 98–107)
CREAT SERPL-MCNC: 1.05 MG/DL (ref 0.51–0.95)
DEPRECATED HCO3 PLAS-SCNC: 25 MMOL/L (ref 22–29)
ERYTHROCYTE [DISTWIDTH] IN BLOOD BY AUTOMATED COUNT: 13.7 % (ref 10–15)
GFR SERPL CREATININE-BSD FRML MDRD: 58 ML/MIN/1.73M2
GLUCOSE BLDC GLUCOMTR-MCNC: 105 MG/DL (ref 70–99)
GLUCOSE BLDC GLUCOMTR-MCNC: 107 MG/DL (ref 70–99)
GLUCOSE BLDC GLUCOMTR-MCNC: 126 MG/DL (ref 70–99)
GLUCOSE BLDC GLUCOMTR-MCNC: 133 MG/DL (ref 70–99)
GLUCOSE BLDC GLUCOMTR-MCNC: 145 MG/DL (ref 70–99)
GLUCOSE BLDC GLUCOMTR-MCNC: 146 MG/DL (ref 70–99)
GLUCOSE SERPL-MCNC: 157 MG/DL (ref 70–99)
HCT VFR BLD AUTO: 42.8 % (ref 35–47)
HGB BLD-MCNC: 13.9 G/DL (ref 11.7–15.7)
LACTATE SERPL-SCNC: 1.1 MMOL/L (ref 0.7–2)
LIPASE SERPL-CCNC: 451 U/L (ref 13–60)
MCH RBC QN AUTO: 31 PG (ref 26.5–33)
MCHC RBC AUTO-ENTMCNC: 32.5 G/DL (ref 31.5–36.5)
MCV RBC AUTO: 95 FL (ref 78–100)
PLATELET # BLD AUTO: 198 10E3/UL (ref 150–450)
POTASSIUM SERPL-SCNC: 4.3 MMOL/L (ref 3.4–5.3)
PROT SERPL-MCNC: 5.7 G/DL (ref 6.4–8.3)
RBC # BLD AUTO: 4.49 10E6/UL (ref 3.8–5.2)
SODIUM SERPL-SCNC: 142 MMOL/L (ref 136–145)
WBC # BLD AUTO: 15.6 10E3/UL (ref 4–11)

## 2023-08-21 PROCEDURE — 258N000003 HC RX IP 258 OP 636: Performed by: INTERNAL MEDICINE

## 2023-08-21 PROCEDURE — 250N000011 HC RX IP 250 OP 636: Performed by: INTERNAL MEDICINE

## 2023-08-21 PROCEDURE — 82248 BILIRUBIN DIRECT: CPT | Performed by: INTERNAL MEDICINE

## 2023-08-21 PROCEDURE — 999N000141 HC STATISTIC PRE-PROCEDURE NURSING ASSESSMENT: Performed by: SURGERY

## 2023-08-21 PROCEDURE — 258N000003 HC RX IP 258 OP 636: Performed by: NURSE ANESTHETIST, CERTIFIED REGISTERED

## 2023-08-21 PROCEDURE — 83690 ASSAY OF LIPASE: CPT | Performed by: PHYSICIAN ASSISTANT

## 2023-08-21 PROCEDURE — 80053 COMPREHEN METABOLIC PANEL: CPT | Performed by: PHYSICIAN ASSISTANT

## 2023-08-21 PROCEDURE — 120N000001 HC R&B MED SURG/OB

## 2023-08-21 PROCEDURE — 250N000011 HC RX IP 250 OP 636: Performed by: NURSE ANESTHETIST, CERTIFIED REGISTERED

## 2023-08-21 PROCEDURE — BF53200 OTHER IMAGING OF GALLBLADDER AND BILE DUCTS USING FLUORESCING AGENT, INDOCYANINE GREEN DYE, INTRAOPERATIVE: ICD-10-PCS | Performed by: SURGERY

## 2023-08-21 PROCEDURE — 250N000011 HC RX IP 250 OP 636: Performed by: SURGERY

## 2023-08-21 PROCEDURE — 250N000011 HC RX IP 250 OP 636: Mod: JZ | Performed by: PHYSICIAN ASSISTANT

## 2023-08-21 PROCEDURE — 370N000017 HC ANESTHESIA TECHNICAL FEE, PER MIN: Performed by: SURGERY

## 2023-08-21 PROCEDURE — 999N000179 XR SURGERY CARM FLUORO LESS THAN 5 MIN W STILLS

## 2023-08-21 PROCEDURE — 47563 LAPARO CHOLECYSTECTOMY/GRAPH: CPT | Performed by: SURGERY

## 2023-08-21 PROCEDURE — 250N000009 HC RX 250: Performed by: NURSE ANESTHETIST, CERTIFIED REGISTERED

## 2023-08-21 PROCEDURE — 85014 HEMATOCRIT: CPT | Performed by: PHYSICIAN ASSISTANT

## 2023-08-21 PROCEDURE — 250N000025 HC SEVOFLURANE, PER MIN: Performed by: SURGERY

## 2023-08-21 PROCEDURE — 255N000002 HC RX 255 OP 636: Mod: JZ | Performed by: SURGERY

## 2023-08-21 PROCEDURE — 36415 COLL VENOUS BLD VENIPUNCTURE: CPT | Performed by: INTERNAL MEDICINE

## 2023-08-21 PROCEDURE — 83605 ASSAY OF LACTIC ACID: CPT | Performed by: PHYSICIAN ASSISTANT

## 2023-08-21 PROCEDURE — 0FT44ZZ RESECTION OF GALLBLADDER, PERCUTANEOUS ENDOSCOPIC APPROACH: ICD-10-PCS | Performed by: SURGERY

## 2023-08-21 PROCEDURE — 258N000001 HC RX 258: Performed by: SURGERY

## 2023-08-21 PROCEDURE — 272N000001 HC OR GENERAL SUPPLY STERILE: Performed by: SURGERY

## 2023-08-21 PROCEDURE — 99233 SBSQ HOSP IP/OBS HIGH 50: CPT | Performed by: INTERNAL MEDICINE

## 2023-08-21 PROCEDURE — 250N000009 HC RX 250: Performed by: SURGERY

## 2023-08-21 PROCEDURE — 88304 TISSUE EXAM BY PATHOLOGIST: CPT | Mod: 26 | Performed by: PATHOLOGY

## 2023-08-21 PROCEDURE — 360N000083 HC SURGERY LEVEL 3 W/ FLUORO, PER MIN: Performed by: SURGERY

## 2023-08-21 PROCEDURE — 710N000009 HC RECOVERY PHASE 1, LEVEL 1, PER MIN: Performed by: SURGERY

## 2023-08-21 PROCEDURE — 47562 LAPAROSCOPIC CHOLECYSTECTOMY: CPT | Mod: AS | Performed by: PHYSICIAN ASSISTANT

## 2023-08-21 PROCEDURE — 250N000011 HC RX IP 250 OP 636: Performed by: PHYSICIAN ASSISTANT

## 2023-08-21 PROCEDURE — 88304 TISSUE EXAM BY PATHOLOGIST: CPT | Mod: TC | Performed by: SURGERY

## 2023-08-21 PROCEDURE — 250N000011 HC RX IP 250 OP 636: Performed by: ANESTHESIOLOGY

## 2023-08-21 RX ORDER — FENTANYL CITRATE 50 UG/ML
25 INJECTION, SOLUTION INTRAMUSCULAR; INTRAVENOUS EVERY 5 MIN PRN
Status: DISCONTINUED | OUTPATIENT
Start: 2023-08-21 | End: 2023-08-21 | Stop reason: HOSPADM

## 2023-08-21 RX ORDER — BUPIVACAINE HYDROCHLORIDE AND EPINEPHRINE 2.5; 5 MG/ML; UG/ML
INJECTION, SOLUTION EPIDURAL; INFILTRATION; INTRACAUDAL; PERINEURAL PRN
Status: DISCONTINUED | OUTPATIENT
Start: 2023-08-21 | End: 2023-08-21 | Stop reason: HOSPADM

## 2023-08-21 RX ORDER — PROPOFOL 10 MG/ML
INJECTION, EMULSION INTRAVENOUS PRN
Status: DISCONTINUED | OUTPATIENT
Start: 2023-08-21 | End: 2023-08-21

## 2023-08-21 RX ORDER — ONDANSETRON 4 MG/1
4 TABLET, ORALLY DISINTEGRATING ORAL EVERY 30 MIN PRN
Status: DISCONTINUED | OUTPATIENT
Start: 2023-08-21 | End: 2023-08-21 | Stop reason: HOSPADM

## 2023-08-21 RX ORDER — HYDROMORPHONE HCL IN WATER/PF 6 MG/30 ML
0.2 PATIENT CONTROLLED ANALGESIA SYRINGE INTRAVENOUS EVERY 5 MIN PRN
Status: DISCONTINUED | OUTPATIENT
Start: 2023-08-21 | End: 2023-08-21 | Stop reason: HOSPADM

## 2023-08-21 RX ORDER — OXYCODONE HYDROCHLORIDE 5 MG/1
10 TABLET ORAL
Status: DISCONTINUED | OUTPATIENT
Start: 2023-08-21 | End: 2023-08-21 | Stop reason: HOSPADM

## 2023-08-21 RX ORDER — HYDROMORPHONE HYDROCHLORIDE 1 MG/ML
0.5 INJECTION, SOLUTION INTRAMUSCULAR; INTRAVENOUS; SUBCUTANEOUS EVERY 4 HOURS PRN
Status: DISCONTINUED | OUTPATIENT
Start: 2023-08-21 | End: 2023-08-22

## 2023-08-21 RX ORDER — OXYCODONE HYDROCHLORIDE 5 MG/1
5 TABLET ORAL
Status: DISCONTINUED | OUTPATIENT
Start: 2023-08-21 | End: 2023-08-21 | Stop reason: HOSPADM

## 2023-08-21 RX ORDER — ACETAMINOPHEN 325 MG/1
975 TABLET ORAL ONCE
Status: COMPLETED | OUTPATIENT
Start: 2023-08-21 | End: 2023-08-21

## 2023-08-21 RX ORDER — CEFAZOLIN SODIUM/WATER 2 G/20 ML
2 SYRINGE (ML) INTRAVENOUS SEE ADMIN INSTRUCTIONS
Status: DISCONTINUED | OUTPATIENT
Start: 2023-08-21 | End: 2023-08-21 | Stop reason: HOSPADM

## 2023-08-21 RX ORDER — HYDROMORPHONE HCL IN WATER/PF 6 MG/30 ML
0.4 PATIENT CONTROLLED ANALGESIA SYRINGE INTRAVENOUS EVERY 5 MIN PRN
Status: DISCONTINUED | OUTPATIENT
Start: 2023-08-21 | End: 2023-08-21 | Stop reason: HOSPADM

## 2023-08-21 RX ORDER — FENTANYL CITRATE 50 UG/ML
50 INJECTION, SOLUTION INTRAMUSCULAR; INTRAVENOUS EVERY 5 MIN PRN
Status: DISCONTINUED | OUTPATIENT
Start: 2023-08-21 | End: 2023-08-21 | Stop reason: HOSPADM

## 2023-08-21 RX ORDER — FENTANYL CITRATE 50 UG/ML
INJECTION, SOLUTION INTRAMUSCULAR; INTRAVENOUS PRN
Status: DISCONTINUED | OUTPATIENT
Start: 2023-08-21 | End: 2023-08-21

## 2023-08-21 RX ORDER — SODIUM CHLORIDE, SODIUM LACTATE, POTASSIUM CHLORIDE, CALCIUM CHLORIDE 600; 310; 30; 20 MG/100ML; MG/100ML; MG/100ML; MG/100ML
INJECTION, SOLUTION INTRAVENOUS CONTINUOUS
Status: DISCONTINUED | OUTPATIENT
Start: 2023-08-21 | End: 2023-08-21 | Stop reason: HOSPADM

## 2023-08-21 RX ORDER — ONDANSETRON 2 MG/ML
4 INJECTION INTRAMUSCULAR; INTRAVENOUS EVERY 30 MIN PRN
Status: DISCONTINUED | OUTPATIENT
Start: 2023-08-21 | End: 2023-08-21 | Stop reason: HOSPADM

## 2023-08-21 RX ORDER — LIDOCAINE 40 MG/G
CREAM TOPICAL
Status: DISCONTINUED | OUTPATIENT
Start: 2023-08-21 | End: 2023-08-21 | Stop reason: HOSPADM

## 2023-08-21 RX ORDER — INDOCYANINE GREEN AND WATER 25 MG
2.5 KIT INJECTION ONCE
Status: COMPLETED | OUTPATIENT
Start: 2023-08-21 | End: 2023-08-21

## 2023-08-21 RX ORDER — LORAZEPAM 2 MG/ML
1 INJECTION INTRAMUSCULAR ONCE
Status: COMPLETED | OUTPATIENT
Start: 2023-08-21 | End: 2023-08-21

## 2023-08-21 RX ORDER — FENTANYL CITRATE 50 UG/ML
50 INJECTION, SOLUTION INTRAMUSCULAR; INTRAVENOUS
Status: COMPLETED | OUTPATIENT
Start: 2023-08-21 | End: 2023-08-21

## 2023-08-21 RX ORDER — NEOSTIGMINE METHYLSULFATE 1 MG/ML
VIAL (ML) INJECTION PRN
Status: DISCONTINUED | OUTPATIENT
Start: 2023-08-21 | End: 2023-08-21

## 2023-08-21 RX ORDER — LIDOCAINE HYDROCHLORIDE 20 MG/ML
INJECTION, SOLUTION INFILTRATION; PERINEURAL PRN
Status: DISCONTINUED | OUTPATIENT
Start: 2023-08-21 | End: 2023-08-21

## 2023-08-21 RX ORDER — CEFAZOLIN SODIUM/WATER 2 G/20 ML
2 SYRINGE (ML) INTRAVENOUS
Status: COMPLETED | OUTPATIENT
Start: 2023-08-21 | End: 2023-08-21

## 2023-08-21 RX ORDER — GLYCOPYRROLATE 0.2 MG/ML
INJECTION, SOLUTION INTRAMUSCULAR; INTRAVENOUS PRN
Status: DISCONTINUED | OUTPATIENT
Start: 2023-08-21 | End: 2023-08-21

## 2023-08-21 RX ORDER — DEXAMETHASONE SODIUM PHOSPHATE 4 MG/ML
INJECTION, SOLUTION INTRA-ARTICULAR; INTRALESIONAL; INTRAMUSCULAR; INTRAVENOUS; SOFT TISSUE PRN
Status: DISCONTINUED | OUTPATIENT
Start: 2023-08-21 | End: 2023-08-21

## 2023-08-21 RX ADMIN — GLYCOPYRROLATE 0.4 MG: 0.2 INJECTION, SOLUTION INTRAMUSCULAR; INTRAVENOUS at 14:57

## 2023-08-21 RX ADMIN — PIPERACILLIN AND TAZOBACTAM 3.38 G: 3; .375 INJECTION, POWDER, FOR SOLUTION INTRAVENOUS at 17:03

## 2023-08-21 RX ADMIN — PIPERACILLIN AND TAZOBACTAM 3.38 G: 3; .375 INJECTION, POWDER, FOR SOLUTION INTRAVENOUS at 05:03

## 2023-08-21 RX ADMIN — NEOSTIGMINE METHYLSULFATE 3 MG: 1 INJECTION, SOLUTION INTRAVENOUS at 14:57

## 2023-08-21 RX ADMIN — LORAZEPAM 1 MG: 2 INJECTION INTRAMUSCULAR; INTRAVENOUS at 04:24

## 2023-08-21 RX ADMIN — ONDANSETRON 4 MG: 2 INJECTION INTRAMUSCULAR; INTRAVENOUS at 00:58

## 2023-08-21 RX ADMIN — FENTANYL CITRATE 50 MCG: 50 INJECTION, SOLUTION INTRAMUSCULAR; INTRAVENOUS at 14:13

## 2023-08-21 RX ADMIN — PHENYLEPHRINE HYDROCHLORIDE 100 MCG: 10 INJECTION INTRAVENOUS at 13:59

## 2023-08-21 RX ADMIN — HYDROMORPHONE HYDROCHLORIDE 0.4 MG: 0.2 INJECTION, SOLUTION INTRAMUSCULAR; INTRAVENOUS; SUBCUTANEOUS at 00:58

## 2023-08-21 RX ADMIN — Medication 2 G: at 13:42

## 2023-08-21 RX ADMIN — SODIUM CHLORIDE, POTASSIUM CHLORIDE, SODIUM LACTATE AND CALCIUM CHLORIDE: 600; 310; 30; 20 INJECTION, SOLUTION INTRAVENOUS at 14:04

## 2023-08-21 RX ADMIN — HYDROMORPHONE HYDROCHLORIDE 0.5 MG: 1 INJECTION, SOLUTION INTRAMUSCULAR; INTRAVENOUS; SUBCUTANEOUS at 05:03

## 2023-08-21 RX ADMIN — DEXAMETHASONE SODIUM PHOSPHATE 4 MG: 4 INJECTION, SOLUTION INTRA-ARTICULAR; INTRALESIONAL; INTRAMUSCULAR; INTRAVENOUS; SOFT TISSUE at 13:50

## 2023-08-21 RX ADMIN — FAMOTIDINE 20 MG: 10 INJECTION, SOLUTION INTRAVENOUS at 08:34

## 2023-08-21 RX ADMIN — FENTANYL CITRATE 50 MCG: 50 INJECTION, SOLUTION INTRAMUSCULAR; INTRAVENOUS at 13:08

## 2023-08-21 RX ADMIN — FENTANYL CITRATE 50 MCG: 50 INJECTION, SOLUTION INTRAMUSCULAR; INTRAVENOUS at 13:49

## 2023-08-21 RX ADMIN — FENTANYL CITRATE 50 MCG: 50 INJECTION, SOLUTION INTRAMUSCULAR; INTRAVENOUS at 15:35

## 2023-08-21 RX ADMIN — HYDROMORPHONE HYDROCHLORIDE 0.5 MG: 1 INJECTION, SOLUTION INTRAMUSCULAR; INTRAVENOUS; SUBCUTANEOUS at 14:03

## 2023-08-21 RX ADMIN — ROCURONIUM BROMIDE 50 MG: 50 INJECTION, SOLUTION INTRAVENOUS at 13:50

## 2023-08-21 RX ADMIN — FENTANYL CITRATE 50 MCG: 50 INJECTION, SOLUTION INTRAMUSCULAR; INTRAVENOUS at 15:16

## 2023-08-21 RX ADMIN — PROPOFOL 180 MG: 10 INJECTION, EMULSION INTRAVENOUS at 13:49

## 2023-08-21 RX ADMIN — ONDANSETRON 4 MG: 2 INJECTION INTRAMUSCULAR; INTRAVENOUS at 14:14

## 2023-08-21 RX ADMIN — SODIUM CHLORIDE, POTASSIUM CHLORIDE, SODIUM LACTATE AND CALCIUM CHLORIDE: 600; 310; 30; 20 INJECTION, SOLUTION INTRAVENOUS at 18:38

## 2023-08-21 RX ADMIN — INDOCYANINE GREEN AND WATER 2.5 MG: KIT at 12:04

## 2023-08-21 RX ADMIN — LIDOCAINE HYDROCHLORIDE 50 MG: 20 INJECTION, SOLUTION INFILTRATION; PERINEURAL at 13:49

## 2023-08-21 RX ADMIN — HYDROMORPHONE HYDROCHLORIDE 0.4 MG: 0.2 INJECTION, SOLUTION INTRAMUSCULAR; INTRAVENOUS; SUBCUTANEOUS at 03:10

## 2023-08-21 ASSESSMENT — ACTIVITIES OF DAILY LIVING (ADL)
ADLS_ACUITY_SCORE: 20
ADLS_ACUITY_SCORE: 28
ADLS_ACUITY_SCORE: 20
ADLS_ACUITY_SCORE: 22
ADLS_ACUITY_SCORE: 28

## 2023-08-21 ASSESSMENT — ENCOUNTER SYMPTOMS: DYSRHYTHMIAS: 0

## 2023-08-21 NOTE — PLAN OF CARE
Goal Outcome Evaluation:      Plan of Care Reviewed With: patient    Overall Patient Progress: no changeOverall Patient Progress: no change     Pt. A&Ox4, c/o abdominal discomfort that also radiates to back, Dilaudid given for pain control with some relief. MD paged due to pt. Having c/o pain and received ativan and increased dilaudid dose, both medications given staggered, but pt. Still rates pain at a 5. Pt. Repositioned, refused heating pad and refuses PO pain medication because she states that she may not be able to keep PO med's down. IVF infusing without difficulty. BGL at 9764=669, BGL at 5645=206. Lung sounds clear, diminished, room air sat's at 93-94%. NPO at this time for possible lap/kimmy. Pt. Denies any needs at this time.

## 2023-08-21 NOTE — ANESTHESIA POSTPROCEDURE EVALUATION
Patient: Marivel Hernandez    Procedure: Procedure(s):  CHOLECYSTECTOMY, LAPAROSCOPIC, WITH CHOLANGIOGRAM       Anesthesia Type:  General    Note:     Postop Pain Control: Uneventful            Sign Out: Well controlled pain   PONV: No   Neuro/Psych: Uneventful            Sign Out: Acceptable/Baseline neuro status   Airway/Respiratory: Uneventful            Sign Out: Acceptable/Baseline resp. status   CV/Hemodynamics: Uneventful            Sign Out: Acceptable CV status   Other NRE: NONE   DID A NON-ROUTINE EVENT OCCUR? No           Last vitals:  Vitals Value Taken Time   /84 08/21/23 1615   Temp 98.4  F (36.9  C) 08/21/23 1615   Pulse 96 08/21/23 1622   Resp     SpO2 96 % 08/21/23 1623   Vitals shown include unvalidated device data.    Electronically Signed By: Diego Estes MD  August 21, 2023  4:34 PM

## 2023-08-21 NOTE — PROVIDER NOTIFICATION
Pt. continues to c/o abdominal pain, most recently rated pain at an 8 and went down to a 5. She has been taking 0.4mg of IV Dilaudid almost every 2 hours. She says she does not want to take PO medications because she is afraid she may throw them up. Zofran was given earlier. MD paged.

## 2023-08-21 NOTE — ANESTHESIA PROCEDURE NOTES
Airway       Patient location during procedure: OR       Procedure Start/Stop Times: 8/21/2023 1:53 PM  Staff -        Anesthesiologist:  Diego Estes MD       CRNA: Severson, Dean Dennis, APRN CRNA       Performed By: CRNA  Consent for Airway        Urgency: elective  Indications and Patient Condition       Indications for airway management: steve-procedural and airway protection       Induction type:intravenous       Mask difficulty assessment: 1 - vent by mask    Final Airway Details       Final airway type: endotracheal airway       Successful airway: ETT - single  Endotracheal Airway Details        ETT size (mm): 7.0       Cuffed: yes       Successful intubation technique: video laryngoscopy       VL Blade Size: Glidescope 3       Grade View of Cords: 1       Adjucts: stylet       Position: Center       Measured from: lips       Secured at (cm): 22       Bite block used: Soft    Post intubation assessment        Placement verified by: capnometry, equal breath sounds and chest rise        Number of attempts at approach: 1       Number of other approaches attempted: 0       Secured with: plastic tape       Ease of procedure: easy       Dentition: Intact and Unchanged    Medication(s) Administered   Medication Administration Time: 8/21/2023 1:53 PM

## 2023-08-21 NOTE — ANESTHESIA PREPROCEDURE EVALUATION
Anesthesia Pre-Procedure Evaluation    Patient: Marivel Hernandez   MRN: [id number] : 1955        Procedure : Procedure(s):  CHOLECYSTECTOMY, LAPAROSCOPIC, WITH CHOLANGIOGRAM          Past Medical History:   Diagnosis Date     Migraine, unspecified, without mention of intractable migraine without mention of status migrainosus      Sciatica     MRI ; L4L5 mild disc protrusion     Ulcerative colitis (H)       Past Surgical History:   Procedure Laterality Date     HYSTERECTOMY, AMY  2006     ZZC NONSPECIFIC PROCEDURE      Tubal Ligation      Allergies   Allergen Reactions     Chocolate Headache     Dairy Digestive      No Known Allergies       Social History     Tobacco Use     Smoking status: Never     Smokeless tobacco: Never   Substance Use Topics     Alcohol use: Yes     Comment: occasional      Wt Readings from Last 1 Encounters:   23 83 kg (183 lb)        Anesthesia Evaluation            ROS/MED HX  ENT/Pulmonary:  - neg pulmonary ROS     Neurologic:     (+)      migraines,                          Cardiovascular:     (+)  hypertension- -   -  - -                                   (-) CAD and arrhythmias   METS/Exercise Tolerance:     Hematologic:       Musculoskeletal:       GI/Hepatic:     (+)          cholecystitis/cholelithiasis,          Renal/Genitourinary:     (+) renal disease (Stage 3), type: CRI,            Endo:     (+)               Obesity,       Psychiatric/Substance Use:  - neg psychiatric ROS     Infectious Disease:       Malignancy:       Other:            Physical Exam    Airway        Mallampati: II   TM distance: > 3 FB   Neck ROM: full   Mouth opening: > 3 cm    Respiratory Devices and Support         Dental           Cardiovascular   cardiovascular exam normal          Pulmonary   pulmonary exam normal            Other findings: Lab Test        23                       0607          1035          0855          1701           WBC          15.6*        19.7*         --          8.1           HGB          13.9         15.3         12.6         11.8          MCV          95           94            --          97            PLT          198          327           --          219            Lab Test        08/21/23 08/21/23 08/21/23 08/21/23 08/20/23 08/20/23 08/24/22                                    1227          1104          0634          0607          1455          1035          0854          0000          NA            --           --           --          142           --          140          140           --           POTASSIUM     --           --           --          4.3           --          3.7          4.5           --           CHLORIDE      --           --           --          108*          --          101          108           --           CO2           --           --           --          25            --          25           25            --           BUN           --           --           --          15.9          --          15.1         21            --           CR            --           --           --          1.05*         --          1.38*        1.08*         --           ANIONGAP      --           --           --          9             --          14           7             --           AMBER           --           --           --          8.7*          --          10.4*        9.8           --           GLC          126*         146*         133*         157*           < >        250*         110*           < >          < > = values in this interval not displayed.                 OUTSIDE LABS:  CBC:   Lab Results   Component Value Date    WBC 15.6 (H) 08/21/2023    WBC 19.7 (H) 08/20/2023    HGB 13.9 08/21/2023    HGB 15.3 08/20/2023    HCT 42.8 08/21/2023    HCT 46.5 08/20/2023     08/21/2023     08/20/2023     BMP:   Lab Results   Component Value Date      08/21/2023     08/20/2023    POTASSIUM 4.3 08/21/2023    POTASSIUM 3.7 08/20/2023    CHLORIDE 108 (H) 08/21/2023    CHLORIDE 101 08/20/2023    CO2 25 08/21/2023    CO2 25 08/20/2023    BUN 15.9 08/21/2023    BUN 15.1 08/20/2023    CR 1.05 (H) 08/21/2023    CR 1.38 (H) 08/20/2023     (H) 08/21/2023     (H) 08/21/2023     COAGS:   Lab Results   Component Value Date    PTT 26 11/26/2010    INR 0.96 11/26/2010     POC:   Lab Results   Component Value Date    HCG Negative 11/26/2010     HEPATIC:   Lab Results   Component Value Date    ALBUMIN 3.5 08/21/2023    PROTTOTAL 5.7 (L) 08/21/2023     (H) 08/21/2023     (H) 08/21/2023    ALKPHOS 195 (H) 08/21/2023    BILITOTAL 0.9 08/21/2023     OTHER:   Lab Results   Component Value Date    LACT 1.1 08/21/2023    A1C 5.8 (H) 08/20/2023    AMBER 8.7 (L) 08/21/2023    LIPASE 451 (H) 08/21/2023    TSH 2.94 09/26/2019    CRP 4.8 02/27/2020    SED 18 02/27/2020       Anesthesia Plan    ASA Status:  2       Anesthesia Type: General.     - Airway: ETT   Induction: Propofol.   Maintenance: Balanced.        Consents    Anesthesia Plan(s) and associated risks, benefits, and realistic alternatives discussed. Questions answered and patient/representative(s) expressed understanding.     - Discussed: Risks, Benefits and Alternatives for the PROCEDURE were discussed     - Discussed with:  Patient      - Extended Intubation/Ventilatory Support Discussed: No.      - Patient is DNR/DNI Status: No     Use of blood products discussed: No .     Postoperative Care    Pain management: IV analgesics, Oral pain medications.   PONV prophylaxis: Ondansetron (or other 5HT-3), Background Propofol Infusion     Comments:              Víctor Navas MD

## 2023-08-21 NOTE — PROGRESS NOTES
[facility]    Medicine Progress Note - Hospitalist Service    Date of Admission:  8/20/2023    Assessment & Plan      Marivel Hernandez is a 68 year old female with a history of HTN, Migraine HA, CKD, Osteopenia who presents to the the hospital with symptomatology of abdominal pain, nausea and emesis.     Addendum:  I was informed by general surgery that patient will be undergoing operative cholecystectomy approach today    Acute Gallstone Pancreatitis  SIRS versus early Sepsis - tachycardic, afebrile, wbc 19.7, lactic acid 4.0, lipase >3k, Tbili 2.4, alk phos 294, , .  Meeting sirs criteria with concern for sepsis from gallbladder.  Cholangitis appears less likely based on imaging.  ED discussed with GI and started on empiric Zosyn during admission process  - CT abd/pelvis revealed hepatic steatosis, changes c/w acute pancreatitis with no evidence for pancreatic necrosis and no peripancreatic fluid collection.Cholelithiasis with nonspecific gallbladder wall thickening and no CT evidence of choledocholithiasis.  - abd U/S revealed cholelithiasis with gallbladder wall thickening and negative sonographic Hou sign  -Appreciate prompt and extensive input from GI and general surgery  -Subsequent lab works showing improving LFTs and decreasing lipase levels  -However she still having abdominal discomfort and pain with accompanying nausea.  No actual vomiting  -Encouraged her to take oral meds for longer pain coverage control  -Continue IV Pepcid  -IV fluid support  -Remain n.p.o.  -Will await further recommendations from GI and surgical service.  We will see if patient will be requiring ERCP but eventually will be needing operative surgical approach     LIZ on CKD - creatinine 1.38 up from baseline 1  - IVF hydration  - hold pta Lisinopril  -Likely from dehydration improving creatinine levels     HTN - hold Lisinopril due to LIZ  -Mildly hypertensive     Hyperglycemia - glucose on  admission 250.  Anion gap and bicarb wnl.  No prior hx of diabetes.  -A1c at 5.8  - start ISS protocol  -Not diabetic but needs close follow-up, unsure if she has underlying prediabetes or chills secondary from underlying illness with pancreatitis     Incidental Findings - noted on CT imaging on admission:  - multiple bilateral indeterminate adrenal nodules favored to represent adenomas. Recommend nonemergent follow-up with adrenal CT.    - Liver segment 6 lesion most likely representing a cavernous hemangioma. It is slightly increased in size since 2010.     Hypercalcemia from dehydration  -Resolved     CODE: full  Diet/IVF: npo, LR  GI ppx:  pepcid  DVT ppx: SCD     Diet: NPO for Medical/Clinical Reasons Except for: Meds    DVT Prophylaxis: Pneumatic Compression Devices  Baez Catheter: Not present  Lines: None     Cardiac Monitoring: None  Code Status: Full Code      Clinically Significant Risk Factors Present on Admission           # Hypercalcemia: Highest Ca = 10.4 mg/dL in last 2 days, will monitor as appropriate        # Hypertension: Noted on problem list                 Disposition Plan     Expected Discharge Date: 08/22/2023      Destination: home            Rafi Casillas MD, MD  Hospitalist Service  [facility]  Securely message with [employer] (more info)  Text page via [employer] Paging/Directory   ______________________________________________________________________    Interval History     Continuing medicine service care today.  Seen and examined.  Chart reviewed.  I met this lady while she is laying comfortably in bed.  She was able to get her some couple of hours of sleep and she still having intermittent episodes of nausea but no actual vomiting.  Currently has stenting taking any oral meds due to nausea.  She was still requiring IV pain narcotics for pain control overnight.  Voiding freely.  Afebrile.  Stable hemodynamics.      Physical Exam   Vital Signs: Temp: 98.7  F (37.1   C) Temp src: Oral BP: (!) 142/94 Pulse: 95   Resp: 16 SpO2: 90 % O2 Device: None (Room air)    Weight: 0 lbs 0 oz    HEENT; Atraumatic, normocephalic, pinkish conjuctiva, pupils bilateral reactive   Skin: warm and moist, no rashes  Lymphatics: no cervical or axillary lymphandenopathy  Lungs: equal chest expansion, clear to auscultation, no wheezes, no stridor, no crackles,   Heart: normal rate, normal rhythm, no rubs or gallops.   Abdomen: Positive bowel sounds, tender upon palpation mostly at epigastric area and right upper quadrant , no guarding   extremities: no deformities, no edema   Neuro; follow commands, alert and oriented x3, spontaneous speech, coherent, moves all extremities spontaneously  Psych; no hallucination, euthymic mood, not agitated      Medical Decision Making       50 MINUTES SPENT BY ME on the date of service doing chart review, history, exam, documentation & further activities per the note.  MANAGEMENT DISCUSSED with the following over the past 24 hours: Yes   NOTE(S)/MEDICAL RECORDS REVIEWED over the past 24 hours: Yes       Data     I have personally reviewed the following data over the past 24 hrs:    15.6 (H)  \   13.9   / 198     142 108 (H) 15.9 /  133 (H)   4.3 25 1.05 (H) \     ALT: 455 (H) AST: 207 (H) AP: 195 (H) TBILI: 0.9   ALB: 3.5 TOT PROTEIN: 5.7 (L) LIPASE: 451 (H)     TSH: N/A T4: N/A A1C: 5.8 (H)     Procal: N/A CRP: N/A Lactic Acid: 1.1         Imaging results reviewed over the past 24 hrs:   Recent Results (from the past 24 hour(s))   Abdomen US, limited (RUQ only)    Narrative    EXAM: US ABDOMEN LIMITED  LOCATION: [facility]  DATE: 8/20/2023    INDICATION: epigastric abd pain  COMPARISON: None.  TECHNIQUE: Limited abdominal ultrasound.    FINDINGS:    GALLBLADDER: Gallstones. Mild gallbladder wall thickening. No pericholecystic fluid. Negative sonographic Hou sign.    BILE DUCTS: No biliary dilatation. The common duct measures 2.7 mm.    LIVER:  Diffusely echogenic. No focal mass.    RIGHT KIDNEY: No hydronephrosis.    PANCREAS: The visualized portions are normal.    No ascites.      Impression    IMPRESSION:  1.  Fatty liver.  2.  Cholelithiasis with gallbladder wall thickening and negative sonographic Hou sign.       CT Abdomen Pelvis w Contrast    Narrative    EXAM: CT ABDOMEN PELVIS W CONTRAST  LOCATION: [facility]  DATE: 8/20/2023    INDICATION: abdominal pain, diffuse, worse epigastrically, cholelithiasis on ultrasound, lactic >4.  COMPARISON: Ultrasound 08/20/2023 and CT 11/26/2010  TECHNIQUE: CT scan of the abdomen and pelvis was performed following injection of IV contrast. Multiplanar reformats were obtained. Dose reduction techniques were used.  CONTRAST: 92mL Isovue 370    FINDINGS:   LOWER CHEST: Fluid-filled lower thoracic esophagus and small hiatal hernia. Mild bibasilar atelectasis/scarring.    HEPATOBILIARY: Hepatic steatosis. Within liver segment 6 there is a 2.3 x 2.7 cm lesion, previously measuring 2.1 x 2.5 cm. It demonstrates discontinuous peripheral nodular enhancement. Cholelithiasis with mild nonspecific wall thickening. No biliary   ductal dilatation. No radiodense intraductal gallstone.    PANCREAS: Moderate diffuse peripancreatic edema with free fluid. No loculated fluid collection. No evidence for pancreatic necrosis.    SPLEEN: Normal.    ADRENAL GLANDS: Bilateral indeterminate adrenal nodules including a dominant 1.9 cm right adrenal nodule and 0.9 cm right adrenal nodule, new since the previous exam. 1.5 cm left adrenal nodule, previously measuring 0.9 cm.    KIDNEYS/BLADDER: Bilateral renal cysts including multiple left peripelvic renal cysts. No follow-up is indicated. No hydronephrosis or hydroureter. Normal bladder.    BOWEL: Normal caliber small bowel without inflammatory changes. Normal appendix. Unremarkable colon. No free air.    LYMPH NODES: Normal.    VASCULATURE: Minimal aortic  atherosclerosis. Patent central SMA and SMV. Patent splenic vein.    PELVIC ORGANS: Hysterectomy. Unremarkable ovaries. No pelvic free fluid.    MUSCULOSKELETAL: Normal.      Impression    IMPRESSION:   1.  Moderate peripancreatic edema and free fluid consistent with acute interstitial edematous pancreatitis. No evidence for pancreatic necrosis and no peripancreatic fluid collection.  2.  Hepatic steatosis.  3.  Cholelithiasis with nonspecific gallbladder wall thickening. No CT evidence of choledocholithiasis.  4.  Multiple bilateral indeterminate adrenal nodules favored to represent adenomas. Recommend nonemergent follow-up with adrenal CT.  5.  Liver segment 6 lesion most likely representing a cavernous hemangioma. It is slightly increased in size since 2010.

## 2023-08-21 NOTE — OP NOTE
[facility] General Surgery Operative Note    Pre-operative diagnosis: gallstone pancreatitis   Post-operative diagnosis: same   Procedure: Laparoscopic Cholecystectomy, Intraoperative cholangiogram, and use of indocyanine green dye   Surgeon: Kavon Duckworth MD   Assistant(s): Chelsea Gerardo PA-C  The Physician Assistant was medically necessary for their expertise in prepping, camera management, suctioning, suturing and retraction.   Anesthesia: general   Estimated blood loss:  Specimen: 10 cc  gallbladder and contents               INDICATION FOR OPERATION: This is a 68 year old female who presented to hospital with abdominal pain. Studies including ultrasound were consistent with gallstone pancreatitis. We discussed laparoscopic cholecystectomy and the patient agreed to proceed after hearing the risks and benefits.    DESCRIPTION OF PROCEDURE:  The patient was taken to the operating room and placed on the table in supine position.  General endotracheal anesthesia was induced and the abdomen was prepped and draped in standard sterile fashion.  A time out was performed.  An 11 blade scalpel was used to make an incision above the umbilicus.  Blunt dissection was carried down to the level of the fascia.  The fascia was elevated with kocher clamps and the abdomen entered with a Veress needle.  After a drop test the abdomen was then insufflated to 12 mmHg.  The Veress needle was then removed and the abdomen was entered with a 5 mm Visiport trocar.  The abdomen was inspected and there were no injuries obviously noted from Veress or trocar placement.  A 12 mm port was placed in the subxiphoid position.  Two additional 5 mm ports were placed in the right abdomen. The patient was placed in reverse Trendelenburg and right side up.  The gallbladder appeared significantly thickened, edematous, and erythematous.  The fundus of the gallbladder was grasped and retracted cephalad. Omental adhesions were taken down with  cautery.  The infundibulum was grasped and retracted laterally.  The peritoneum over the medial and lateral aspects of the triangle of Calot was taken down with the Maryland dissector and modest amounts of Bovie electrocautery. . The cystic duct and artery were freed up from surrounding tissues.  The triangle of Calot was skeletonized revealing the critical view of safety.    As the patient had received ICG preoperatively, Intraoperative fluorescence was also used to visualize the course of the common bile duct and cystic duct, with no other ductal structures seen and An angiocatheter was then placed through the abdominal wall under direct visualization.  1 clip was placed just under the neck of the gallbladder.  A small cut was then made into what appeared to be the cystic duct.  Next the taut catheter was fed into the abdomen.  This catheter catheter was placed into the cystic duct and a clip placed across the metal portion.  Saline was flushed and there appeared to be no leaks. Cholangiogram was then completed with complete filling of bilateral hepatic ducts and common bile duct with emptying into the duodenum and no evidence of filling defects to indicate stones. The Xray was saved and the clip and catheter were removed.  2 clips were then placed proximally on the cystic duct.  The cystic artery and duct were each clipped twice proximally, once distally and transected with the scissors.  The gallbladder was then removed from the liver using the hook electrocautery. There was spillage of bile from the gallbladder during dissection which was suctioned out, The liver bed was visualized with ICG fluorescence and no bile leakage was seen, and The abdomen was irrigated with saline and suctioned clear.   The gallbladder was passed into an Endocatch bag. We observed the right upper quadrant carefully for hemostasis.  Hemostasis was assured.  The gallbladder was removed from the 12 mm port.  Each of the remaining ports  were removed under direct visualization.  The CO2 was evacuated.  There was no bleeding from any of these sites.  The 12 mm port was closed with 0 Vicryl in figure-of-eight fashion. All incision sites were anesthetized with local anesthetic.  All of the incisions were closed with interrupted 4-0 Vicryl subcuticular sutures and sterile glue was placed as a dressing.  The patient tolerated the procedure well.  Sponge and instrument counts were correct.      FINDINGS: inflamed gallbladder, large gallstone    Kavon Duckworth MD

## 2023-08-21 NOTE — PROGRESS NOTES
Surgery Progress Note  8/21/2023    Patient admitted with gallstone pancreatitis.   Discussion with the patient this morning showing resolution in her abdominal pain. Reviewing her labs showing significant improvement in LFT and lipase.   Gen: NAD, alert  Abd: mild RUQ tenderness, soft    Lengthy discussion with patient regarding diagnosis of gallstone pancreatitis timing of surgery vs ERCP intervention. Also discussed patient with GI. Due to resolving lab values and abdominal pain. Will plan to perform lap kimmy with intraoperative cholangiogram. If cholangiogram clear and labs continue to improve will likely not need ERCP. If defect seen during cholangiogram will notify Gi of need for ERCP. Discussed risks/benefits/alternatives. Patient would like to proceed with surgery.

## 2023-08-22 LAB
ALBUMIN SERPL BCG-MCNC: 2.7 G/DL (ref 3.5–5.2)
ALP SERPL-CCNC: 140 U/L (ref 35–104)
ALT SERPL W P-5'-P-CCNC: 201 U/L (ref 0–50)
ANION GAP SERPL CALCULATED.3IONS-SCNC: 11 MMOL/L (ref 7–15)
AST SERPL W P-5'-P-CCNC: 91 U/L (ref 0–45)
BASOPHILS # BLD AUTO: 0 10E3/UL (ref 0–0.2)
BASOPHILS NFR BLD AUTO: 0 %
BILIRUB SERPL-MCNC: 1 MG/DL
BUN SERPL-MCNC: 13.6 MG/DL (ref 8–23)
CALCIUM SERPL-MCNC: 8.1 MG/DL (ref 8.8–10.2)
CHLORIDE SERPL-SCNC: 105 MMOL/L (ref 98–107)
CREAT SERPL-MCNC: 0.96 MG/DL (ref 0.51–0.95)
DEPRECATED HCO3 PLAS-SCNC: 24 MMOL/L (ref 22–29)
EOSINOPHIL # BLD AUTO: 0 10E3/UL (ref 0–0.7)
EOSINOPHIL NFR BLD AUTO: 0 %
ERYTHROCYTE [DISTWIDTH] IN BLOOD BY AUTOMATED COUNT: 14.3 % (ref 10–15)
GFR SERPL CREATININE-BSD FRML MDRD: 65 ML/MIN/1.73M2
GLUCOSE BLDC GLUCOMTR-MCNC: 105 MG/DL (ref 70–99)
GLUCOSE BLDC GLUCOMTR-MCNC: 116 MG/DL (ref 70–99)
GLUCOSE BLDC GLUCOMTR-MCNC: 137 MG/DL (ref 70–99)
GLUCOSE BLDC GLUCOMTR-MCNC: 65 MG/DL (ref 70–99)
GLUCOSE BLDC GLUCOMTR-MCNC: 71 MG/DL (ref 70–99)
GLUCOSE BLDC GLUCOMTR-MCNC: 98 MG/DL (ref 70–99)
GLUCOSE SERPL-MCNC: 147 MG/DL (ref 70–99)
HCT VFR BLD AUTO: 36.3 % (ref 35–47)
HGB BLD-MCNC: 11.7 G/DL (ref 11.7–15.7)
IMM GRANULOCYTES # BLD: 0.1 10E3/UL
IMM GRANULOCYTES NFR BLD: 1 %
LYMPHOCYTES # BLD AUTO: 0.9 10E3/UL (ref 0.8–5.3)
LYMPHOCYTES NFR BLD AUTO: 5 %
MCH RBC QN AUTO: 31.2 PG (ref 26.5–33)
MCHC RBC AUTO-ENTMCNC: 32.2 G/DL (ref 31.5–36.5)
MCV RBC AUTO: 97 FL (ref 78–100)
MONOCYTES # BLD AUTO: 0.8 10E3/UL (ref 0–1.3)
MONOCYTES NFR BLD AUTO: 5 %
NEUTROPHILS # BLD AUTO: 15.1 10E3/UL (ref 1.6–8.3)
NEUTROPHILS NFR BLD AUTO: 89 %
NRBC # BLD AUTO: 0 10E3/UL
NRBC BLD AUTO-RTO: 0 /100
PATH REPORT.COMMENTS IMP SPEC: NORMAL
PATH REPORT.COMMENTS IMP SPEC: NORMAL
PATH REPORT.FINAL DX SPEC: NORMAL
PATH REPORT.GROSS SPEC: NORMAL
PATH REPORT.MICROSCOPIC SPEC OTHER STN: NORMAL
PATH REPORT.RELEVANT HX SPEC: NORMAL
PHOTO IMAGE: NORMAL
PLATELET # BLD AUTO: 163 10E3/UL (ref 150–450)
POTASSIUM SERPL-SCNC: 4 MMOL/L (ref 3.4–5.3)
PROT SERPL-MCNC: 5.5 G/DL (ref 6.4–8.3)
RBC # BLD AUTO: 3.75 10E6/UL (ref 3.8–5.2)
SODIUM SERPL-SCNC: 140 MMOL/L (ref 136–145)
WBC # BLD AUTO: 16.9 10E3/UL (ref 4–11)

## 2023-08-22 PROCEDURE — 250N000011 HC RX IP 250 OP 636: Mod: JZ | Performed by: SURGERY

## 2023-08-22 PROCEDURE — 258N000003 HC RX IP 258 OP 636: Performed by: SURGERY

## 2023-08-22 PROCEDURE — 36415 COLL VENOUS BLD VENIPUNCTURE: CPT | Performed by: SURGERY

## 2023-08-22 PROCEDURE — 99232 SBSQ HOSP IP/OBS MODERATE 35: CPT | Performed by: INTERNAL MEDICINE

## 2023-08-22 PROCEDURE — 250N000011 HC RX IP 250 OP 636: Performed by: SURGERY

## 2023-08-22 PROCEDURE — 80053 COMPREHEN METABOLIC PANEL: CPT | Performed by: SURGERY

## 2023-08-22 PROCEDURE — 120N000001 HC R&B MED SURG/OB

## 2023-08-22 PROCEDURE — 250N000013 HC RX MED GY IP 250 OP 250 PS 637: Performed by: PHYSICIAN ASSISTANT

## 2023-08-22 PROCEDURE — 85025 COMPLETE CBC W/AUTO DIFF WBC: CPT | Performed by: SURGERY

## 2023-08-22 PROCEDURE — 250N000013 HC RX MED GY IP 250 OP 250 PS 637: Performed by: INTERNAL MEDICINE

## 2023-08-22 PROCEDURE — 250N000013 HC RX MED GY IP 250 OP 250 PS 637: Performed by: SURGERY

## 2023-08-22 PROCEDURE — 250N000011 HC RX IP 250 OP 636: Performed by: INTERNAL MEDICINE

## 2023-08-22 RX ORDER — NICOTINE POLACRILEX 4 MG
15-30 LOZENGE BUCCAL
Status: DISCONTINUED | OUTPATIENT
Start: 2023-08-22 | End: 2023-08-26 | Stop reason: HOSPADM

## 2023-08-22 RX ORDER — AMOXICILLIN 250 MG
1 CAPSULE ORAL 2 TIMES DAILY
Status: DISCONTINUED | OUTPATIENT
Start: 2023-08-22 | End: 2023-08-26 | Stop reason: HOSPADM

## 2023-08-22 RX ORDER — DEXTROSE MONOHYDRATE 25 G/50ML
25-50 INJECTION, SOLUTION INTRAVENOUS
Status: DISCONTINUED | OUTPATIENT
Start: 2023-08-22 | End: 2023-08-26 | Stop reason: HOSPADM

## 2023-08-22 RX ORDER — OXYCODONE HYDROCHLORIDE 5 MG/1
5 TABLET ORAL
Status: DISCONTINUED | OUTPATIENT
Start: 2023-08-22 | End: 2023-08-26 | Stop reason: HOSPADM

## 2023-08-22 RX ORDER — HYDROMORPHONE HYDROCHLORIDE 1 MG/ML
0.3 INJECTION, SOLUTION INTRAMUSCULAR; INTRAVENOUS; SUBCUTANEOUS
Status: DISCONTINUED | OUTPATIENT
Start: 2023-08-22 | End: 2023-08-26 | Stop reason: HOSPADM

## 2023-08-22 RX ORDER — POLYETHYLENE GLYCOL 3350 17 G/17G
17 POWDER, FOR SOLUTION ORAL DAILY
Status: DISCONTINUED | OUTPATIENT
Start: 2023-08-22 | End: 2023-08-24

## 2023-08-22 RX ORDER — AMOXICILLIN 250 MG
2 CAPSULE ORAL 2 TIMES DAILY
Status: DISCONTINUED | OUTPATIENT
Start: 2023-08-22 | End: 2023-08-26 | Stop reason: HOSPADM

## 2023-08-22 RX ORDER — LIDOCAINE 40 MG/G
CREAM TOPICAL
Status: DISCONTINUED | OUTPATIENT
Start: 2023-08-22 | End: 2023-08-26 | Stop reason: HOSPADM

## 2023-08-22 RX ADMIN — ACETAMINOPHEN 650 MG: 325 TABLET, FILM COATED ORAL at 23:37

## 2023-08-22 RX ADMIN — OXYCODONE HYDROCHLORIDE 5 MG: 5 TABLET ORAL at 20:53

## 2023-08-22 RX ADMIN — OXYCODONE HYDROCHLORIDE 5 MG: 5 TABLET ORAL at 02:23

## 2023-08-22 RX ADMIN — PIPERACILLIN AND TAZOBACTAM 3.38 G: 3; .375 INJECTION, POWDER, FOR SOLUTION INTRAVENOUS at 20:54

## 2023-08-22 RX ADMIN — ACETAMINOPHEN 650 MG: 325 TABLET, FILM COATED ORAL at 02:23

## 2023-08-22 RX ADMIN — PIPERACILLIN AND TAZOBACTAM 3.38 G: 3; .375 INJECTION, POWDER, FOR SOLUTION INTRAVENOUS at 03:50

## 2023-08-22 RX ADMIN — ACETAMINOPHEN 650 MG: 325 TABLET, FILM COATED ORAL at 09:02

## 2023-08-22 RX ADMIN — SENNOSIDES AND DOCUSATE SODIUM 2 TABLET: 50; 8.6 TABLET ORAL at 09:02

## 2023-08-22 RX ADMIN — OXYCODONE HYDROCHLORIDE 5 MG: 5 TABLET ORAL at 09:02

## 2023-08-22 RX ADMIN — PIPERACILLIN AND TAZOBACTAM 3.38 G: 3; .375 INJECTION, POWDER, FOR SOLUTION INTRAVENOUS at 13:55

## 2023-08-22 RX ADMIN — OXYCODONE HYDROCHLORIDE 5 MG: 5 TABLET ORAL at 17:33

## 2023-08-22 RX ADMIN — HYDROMORPHONE HYDROCHLORIDE 0.5 MG: 1 INJECTION, SOLUTION INTRAMUSCULAR; INTRAVENOUS; SUBCUTANEOUS at 05:56

## 2023-08-22 RX ADMIN — SODIUM CHLORIDE, POTASSIUM CHLORIDE, SODIUM LACTATE AND CALCIUM CHLORIDE: 600; 310; 30; 20 INJECTION, SOLUTION INTRAVENOUS at 09:06

## 2023-08-22 RX ADMIN — FAMOTIDINE 20 MG: 10 INJECTION, SOLUTION INTRAVENOUS at 09:02

## 2023-08-22 RX ADMIN — OXYCODONE HYDROCHLORIDE 5 MG: 5 TABLET ORAL at 12:22

## 2023-08-22 RX ADMIN — SENNOSIDES AND DOCUSATE SODIUM 2 TABLET: 50; 8.6 TABLET ORAL at 20:53

## 2023-08-22 RX ADMIN — POLYETHYLENE GLYCOL 3350 17 G: 17 POWDER, FOR SOLUTION ORAL at 17:34

## 2023-08-22 RX ADMIN — PIPERACILLIN AND TAZOBACTAM 3.38 G: 3; .375 INJECTION, POWDER, FOR SOLUTION INTRAVENOUS at 09:13

## 2023-08-22 RX ADMIN — ACETAMINOPHEN 650 MG: 325 TABLET, FILM COATED ORAL at 17:33

## 2023-08-22 ASSESSMENT — ACTIVITIES OF DAILY LIVING (ADL)
ADLS_ACUITY_SCORE: 28
ADLS_ACUITY_SCORE: 28
ADLS_ACUITY_SCORE: 22
ADLS_ACUITY_SCORE: 28
ADLS_ACUITY_SCORE: 22
ADLS_ACUITY_SCORE: 28
ADLS_ACUITY_SCORE: 28
ADLS_ACUITY_SCORE: 24
ADLS_ACUITY_SCORE: 28

## 2023-08-22 NOTE — PROGRESS NOTES
"GASTROENTEROLOGY PROGRESS NOTE        SUBJECTIVE: Significant improvement in abdominal pain over the past 24 hours.  No fever.  Some leukocytosis which may be postoperative versus secondary to pancreatitis.  Tolerating clears.  No flatus.     OBJECTIVE:    BP (!) 144/75 (BP Location: Left arm)   Pulse 88   Temp 97.9  F (36.6  C) (Oral)   Resp 16   Ht 1.6 m (5' 3\")   Wt 84 kg (185 lb 3 oz)   SpO2 93%   BMI 32.80 kg/m    Temp (24hrs), Av.1  F (36.7  C), Min:97.9  F (36.6  C), Max:98.4  F (36.9  C)    Patient Vitals for the past 72 hrs:   Weight   23 0653 84 kg (185 lb 3 oz)   23 1200 83 kg (183 lb)       Intake/Output Summary (Last 24 hours) at 2023 1152  Last data filed at 2023 1147  Gross per 24 hour   Intake 2273 ml   Output 200 ml   Net 2073 ml        PHYSICAL EXAM     Constitutional: Appearing fatigued, no acute distress    Abdomen: Soft, minimal incisional pain, no rebound or guarding         Additional Comments:  ROS, FH, SH: See initial GI consult for details.     I have reviewed the patient's new clinical lab results:     Recent Labs   Lab Test 23  0623  0623  1035   WBC 16.9* 15.6* 19.7*   HGB 11.7 13.9 15.3   MCV 97 95 94    198 327     Recent Labs   Lab Test 23  0623  0607 23  1035   POTASSIUM 4.0 4.3 3.7   CHLORIDE 105 108* 101   CO2 24 25 25   BUN 13.6 15.9 15.1   ANIONGAP 11 9 14     Recent Labs   Lab Test 23  0641 23  0607 23  1829 23  1035   ALBUMIN 2.7* 3.5  --  4.6   BILITOTAL 1.0 0.9  --  2.4*   * 455*  --  875*   AST 91* 207*  --  901*   PROTEIN  --   --  30*  --    LIPASE  --  451*  --  >3,000*       ASSESSMENT/ PLAN  Marivel Hernandez is a 68-year-old female with history of hypertension and CKD who presents to the hospital with abdominal pain, nausea, and vomiting found to have acute gallstone pancreatitis.    1.  Acute gallstone pancreatitis: CT of the abdomen pelvis revealing " hepatic steatosis, acute pancreatitis without evidence of necrosis, no peripancreatic fluid collection.  Ultrasound with cholelithiasis with gallbladder wall thickening.  LFTs were initially elevated 8-9 100s with a total bilirubin of 2.4.  LFTs improved so general surgery recommended cholecystectomy with intraoperative cholangiograms.  Intraoperative cholangiogram was negative for obstruction on 8/21.  Lipase with significant improvement.  No current abdominal discomfort except for incisional pain.    -- Currently on clears, awaiting return of bowel function.  -- Ongoing management per general surgery.  -- No fever, chills, nausea or vomiting.  -- LFTs improving significantly.  -- Diet per general surgery.      Discussed with Dr. Rosales.  Will no longer follow.  Please call with questions or change in condition.    Bina Alicea PA-C  [facility] ( [facility])

## 2023-08-22 NOTE — PROGRESS NOTES
[facility]    Medicine Progress Note - Hospitalist Service    Date of Admission:  8/20/2023    Assessment & Plan      Marivel Hernandez is a 68 year old female with a history of HTN, Migraine HA, CKD, Osteopenia who presents to the the hospital with symptomatology of abdominal pain, nausea and emesis.      Acute Gallstone Pancreatitis  SIRS versus early Sepsis - tachycardic, afebrile, wbc 19.7, lactic acid 4.0, lipase >3k, Tbili 2.4, alk phos 294, , .  Meeting sirs criteria with concern for sepsis from gallbladder.  Cholangitis appears less likely based on imaging.  ED discussed with GI and started on empiric Zosyn during admission process  - CT abd/pelvis revealed hepatic steatosis, changes c/w acute pancreatitis with no evidence for pancreatic necrosis and no peripancreatic fluid collection.Cholelithiasis with nonspecific gallbladder wall thickening and no CT evidence of choledocholithiasis.  - abd U/S revealed cholelithiasis with gallbladder wall thickening and negative sonographic Hou sign  -Tolerated laparoscopic cholecystectomy last 8/21/2023  -Appreciate prompt and extensive input from GI and general surgery  -LFTs still showing improvement  -Currently on liquid diet  -Optimize pain control  -May decrease rate of IV fluid support as currently she is still clear liquids    -Continue IV Pepcid    LIZ on CKD - creatinine 1.38 up from baseline 1  - IVF hydration  -Most recent creatinine improved to 0.96  -Likely from dehydration improving creatinine levels     HTN   -if still showing elevated blood pressure levels may be able to resume home regimen of lisinopril as kidney function has significantly improved       Hyperglycemia - glucose on admission 250.  Anion gap and bicarb wnl.  No prior hx of diabetes.  -A1c at 5.8  - start ISS protocol  -Not diabetic but needs close follow-up, unsure if she has underlying prediabetes or chills secondary from underlying illness with  "pancreatitis     Incidental Findings - noted on CT imaging on admission:  - multiple bilateral indeterminate adrenal nodules favored to represent adenomas. Recommend nonemergent follow-up with adrenal CT.    - Liver segment 6 lesion most likely representing a cavernous hemangioma. It is slightly increased in size since 2010.     Hypercalcemia from dehydration  -Resolved     CODE: full  Diet/IVF: npo, LR  GI ppx:  pepcid  DVT ppx: SCD     Diet: Advance Diet as Tolerated: Clear Liquid Diet    DVT Prophylaxis: Pneumatic Compression Devices  Baez Catheter: Not present  Lines: None     Cardiac Monitoring: None  Code Status: Full Code      Clinically Significant Risk Factors              # Hypoalbuminemia: Lowest albumin = 2.7 g/dL at 8/22/2023  6:41 AM, will monitor as appropriate       # Hypertension: Noted on problem list        # Obesity: Estimated body mass index is 32.8 kg/m  as calculated from the following:    Height as of this encounter: 1.6 m (5' 3\").    Weight as of this encounter: 84 kg (185 lb 3 oz)., PRESENT ON ADMISSION            Disposition Plan      Expected Discharge Date: Anticipating at least 1-2 more inpatient days.  Awaiting resumption of bowel function  Destination: home            Rafi Casillas MD, MD  Hospitalist Service  [facility]  Securely message with [employer] (more info)  Text page via [facility] Paging/Directory   ______________________________________________________________________    Interval History     Continuing medicine service care today.  Seen and examined.  Chart reviewed.  I met this lady while she is  sitting comfortably on the hospital recliner.  She feels little better than yesterday.  Not requiring any oxygen support.  Still having intermittent abdominal discomfort and pain but no actual vomiting spells.  Voiding freely.  No passing of flatus yet.   Currently tolerating some clear liquid diet      Physical Exam   Vital Signs: Temp: 97.9  F (36.6  C) " Temp src: Oral BP: (!) 144/75 Pulse: 88   Resp: 16 SpO2: 93 % O2 Device: None (Room air) Oxygen Delivery: 3 LPM  Weight: 185 lbs 2.98 oz    HEENT; Atraumatic, normocephalic, pinkish conjuctiva, pupils bilateral reactive   Skin: warm and moist, no rashes  Lymphatics: no cervical or axillary lymphandenopathy  Lungs: equal chest expansion, clear to auscultation, no wheezes, no stridor, no crackles,   Heart: normal rate, normal rhythm, no rubs or gallops.   Abdomen: Positive bowel sounds, mildly tender upon palpation mostly at epigastric area and right upper quadrant , no guarding   extremities: no deformities, no edema   Neuro; follow commands, alert and oriented x3, spontaneous speech, coherent, moves all extremities spontaneously  Psych; no hallucination, euthymic mood, not agitated      Medical Decision Making       50 MINUTES SPENT BY ME on the date of service doing chart review, history, exam, documentation & further activities per the note.  MANAGEMENT DISCUSSED with the following over the past 24 hours: Yes   NOTE(S)/MEDICAL RECORDS REVIEWED over the past 24 hours: Yes       Data     I have personally reviewed the following data over the past 24 hrs:    16.9 (H)  \   11.7   / 163     140 105 13.6 /  98   4.0 24 0.96 (H) \     ALT: 201 (H) AST: 91 (H) AP: 140 (H) TBILI: 1.0   ALB: 2.7 (L) TOT PROTEIN: 5.5 (L) LIPASE: N/A     Imaging results reviewed over the past 24 hrs:   Recent Results (from the past 24 hour(s))   XR Surgery MICAH L/T 5 Min Fluoro w Stills    Narrative    CHOLANGIOGRAM SURGERY  8/21/2023 2:37 PM    HISTORY: lap kimmy    COMPARISON: CT 8/20/2023.    FLUOROSCOPY TIME: 0.1 minutes.    FINDINGS:    Fluoroscopic spot images of a cystic duct injection  demonstrate opacification of the common bile duct. No filling defects  are identified within the common bile duct.      Impression    IMPRESSION:    No evidence for choledocholithiasis within the common  bile duct.    KAYLA AGUILAR MD         SYSTEM  ID:  [id number]

## 2023-08-22 NOTE — PLAN OF CARE
Goal Outcome Evaluation:      Plan of Care Reviewed With: patient    Overall Patient Progress: improvingOverall Patient Progress: improving     VS: VSS on 3L nasal cannula.  Pain: pain managed by meds.   Lines: PIV SL.   Cognitive / Neruo: Aox4. Neuro intact.   Respiratory: LS: clear. Denies SOB.  Cardiac: Denies CP. Tele: None.   Peripheral Neurovascular: No edema. Denies numbness, tingling, and tenderness. Pulses 2+.   GI: Last BM: _has not passed gas__. Denies N/V.   : urgency to go.   Skin: puffy skin and bruises in forearm. See flowsheet for skin assessment.   Diet: Clear liquids.   Activity:assist of 1   Plan: Continue w/ POC.      Oxycodone,tylenol an dilaudid for pain of 8. BS- 106, no insulin given. LR running at 100ml/hr. Site is CDI. Tolerating oral meds and clear liquids. Zosyn given at 3am due to no IV. Rescheduled for 9am.

## 2023-08-22 NOTE — PLAN OF CARE
Goal Outcome Evaluation:      Plan of Care Reviewed With: patient    Overall Patient Progress: improving    Patient alert and oriented x4. Vital signs are stable. Patient had Lap Abby today and was absent for most of shift. Pain Management is biggest issue. Patient has LR running at 125/hr and is currently on clear liquids only post op. Patient is sleepy from surgery. Patient is not on any electrolyte protocols or tele.

## 2023-08-22 NOTE — DISCHARGE INSTRUCTIONS
HOME CARE FOLLOWING LAPAROSCOPIC CHOLECYSTECTOMY  BUD Kovacs, DONNA Willingham C. Pratt, J. Shaheen    INCISIONAL CARE:  Dermabond (a type of skin glue) is present - Leave in place until it wears/flakes off (2-3 weeks).     BATHING:  Shower is ok. Avoid submerging incisions in bathtub for 7 days.    ACTIVITY:  Light Activity -- you may immediately be up and about as tolerated.  Walking is encouraged, increase as tolerated.  Driving/Light Work-- when comfortable and off narcotic pain medications.  Strenuous Work/Activity -- limit lifting to 20 pounds for 2 weeks.  Progressively increase with time.  Active Sports (running, biking, etc.) -- cautiously resume after 2 weeks.    DIET:  Start with liquids and gradually increase diet as tolerated.  Drink plenty of fluids.  While taking pain medications, consider use of a stool softener, increase your fiber in your diet, or add a fiber supplement (like Metamucil, Citrucel) to help prevent constipation - a possible side effect of pain medications.  It is not uncommon to experience some bowel changes (loose stools or constipation) after surgery.  Your body has to adapt to you no longer having a gall bladder.  To help minimize this side effect, avoid fatty foods for 1-2 weeks after surgery.  You may then slowly increase the amount of fatty foods in your diet.      FOLLOW-UP AFTER SURGERY:  -Our office will contact you approximately 2-3 weeks after surgery to check on your progress and answer any questions you may have.  If you are doing well, you will not need to return for an office appointment.  If any concerns are identified over the phone, we will help you make an appointment to see a provider.    -If you have not received a phone call, have any questions or concerns, or would like to be seen, please call us at [phone].  We are located at: [address], [address]; [place] [address]    -CONTACT US IF THE FOLLOWING DEVELOPS:   1. A  fever that is above 101     2. Increased redness, warmth, drainage, bleeding, or swelling.   3. Pain that is not relieved by rest/ice and your prescription.   4.  Increasing pain after 48 hours.   5. Drainage that is thick, cloudy, yellow, green or white.     If you have other questions, please call the office Monday thru Friday between 8am and 4:30pm to discuss with the nurse or physician assistant.  #[phone]    There is a surgeon ON CALL on weekday evenings and over the weekend in case of urgent need only, and may be contacted at the same number.    If you are having an emergency, call 911 or proceed to your nearest emergency department.

## 2023-08-22 NOTE — PROGRESS NOTES
Abdominal tender, no gas or bowel sounds minimal clear po intake. LR infusing. Up ambulating in garcía SBA/abd binder/gait belt x4 between . Pain managed with oxycodone 5mg tablets x3 and ice to the back. Pt stating low back pain is worse than incisional pain. Lap sites clean/intact. IVAB. Lungs clear. IS to 750, completes on own correctly. IVF/IVAB.

## 2023-08-22 NOTE — PROGRESS NOTES
"[facility]   General Surgery Progress Note           Assessment and Plan:   Assessment:   1 Day Post-Op  s/p Procedure(s):  CHOLECYSTECTOMY, LAPAROSCOPIC, WITH CHOLANGIOGRAM  LFTs trending down  WBC 16K today      Plan:   -ADAT to low fat diet  -pain control: Tylenol, Oxcodone.   -bowel regimen: daily Miralax, BID Senokot  -continue IV abx (Zosyn)  -dispo pending diet tolerance and improvement in leukocytosis  Seen and agree; still a slight ileus, not ready for discharge today         Interval History:   Afebrile. Primary c/o back pain. Preop abd pain improved. Tolerating CLD so far. No flatus yet. Stood up at bedside this am, planning to walk soon.         Physical Exam:   Blood pressure (!) 144/75, pulse 88, temperature 97.9  F (36.6  C), temperature source Oral, resp. rate 16, height 1.6 m (5' 3\"), weight 84 kg (185 lb 3 oz), SpO2 93 %, not currently breastfeeding.    I/O last 3 completed shifts:  In: 1500 [I.V.:1500]  Out: 200 [Urine:200]    Abdomen:   soft, non-distended, non-tender and absent bowel sounds   Inc(s) - clean, dry, intact           Data:     Recent Labs   Lab 08/22/23  0641 08/21/23  0607 08/20/23  1035   WBC 16.9* 15.6* 19.7*   HGB 11.7 13.9 15.3   HCT 36.3 42.8 46.5   MCV 97 95 94    198 327     Recent Labs   Lab 08/22/23  0641 08/21/23  0607 08/20/23  1035   AST 91* 207* 901*   * 455* 875*   ALKPHOS 140* 195* 294*   BILITOTAL 1.0 0.9 2.4*       Chelsea Gerardo PA-C  Text page: [phone] 8 am to 4 pm  After 4 pm, call [phone]       "

## 2023-08-23 LAB
ALBUMIN SERPL BCG-MCNC: 2.6 G/DL (ref 3.5–5.2)
ALP SERPL-CCNC: 120 U/L (ref 35–104)
ALT SERPL W P-5'-P-CCNC: 112 U/L (ref 0–50)
ANION GAP SERPL CALCULATED.3IONS-SCNC: 8 MMOL/L (ref 7–15)
AST SERPL W P-5'-P-CCNC: 49 U/L (ref 0–45)
BASOPHILS # BLD AUTO: 0 10E3/UL (ref 0–0.2)
BASOPHILS NFR BLD AUTO: 0 %
BILIRUB SERPL-MCNC: 0.8 MG/DL
BUN SERPL-MCNC: 10.5 MG/DL (ref 8–23)
CALCIUM SERPL-MCNC: 8.2 MG/DL (ref 8.8–10.2)
CHLORIDE SERPL-SCNC: 105 MMOL/L (ref 98–107)
CREAT SERPL-MCNC: 0.89 MG/DL (ref 0.51–0.95)
DEPRECATED HCO3 PLAS-SCNC: 25 MMOL/L (ref 22–29)
EOSINOPHIL # BLD AUTO: 0 10E3/UL (ref 0–0.7)
EOSINOPHIL NFR BLD AUTO: 0 %
ERYTHROCYTE [DISTWIDTH] IN BLOOD BY AUTOMATED COUNT: 14 % (ref 10–15)
GFR SERPL CREATININE-BSD FRML MDRD: 71 ML/MIN/1.73M2
GLUCOSE BLDC GLUCOMTR-MCNC: 104 MG/DL (ref 70–99)
GLUCOSE BLDC GLUCOMTR-MCNC: 109 MG/DL (ref 70–99)
GLUCOSE BLDC GLUCOMTR-MCNC: 120 MG/DL (ref 70–99)
GLUCOSE BLDC GLUCOMTR-MCNC: 127 MG/DL (ref 70–99)
GLUCOSE BLDC GLUCOMTR-MCNC: 130 MG/DL (ref 70–99)
GLUCOSE BLDC GLUCOMTR-MCNC: 137 MG/DL (ref 70–99)
GLUCOSE BLDC GLUCOMTR-MCNC: 99 MG/DL (ref 70–99)
GLUCOSE SERPL-MCNC: 119 MG/DL (ref 70–99)
HCT VFR BLD AUTO: 32.9 % (ref 35–47)
HGB BLD-MCNC: 10.6 G/DL (ref 11.7–15.7)
IMM GRANULOCYTES # BLD: 0.1 10E3/UL
IMM GRANULOCYTES NFR BLD: 1 %
LYMPHOCYTES # BLD AUTO: 0.8 10E3/UL (ref 0.8–5.3)
LYMPHOCYTES NFR BLD AUTO: 6 %
MCH RBC QN AUTO: 30.9 PG (ref 26.5–33)
MCHC RBC AUTO-ENTMCNC: 32.2 G/DL (ref 31.5–36.5)
MCV RBC AUTO: 96 FL (ref 78–100)
MONOCYTES # BLD AUTO: 0.7 10E3/UL (ref 0–1.3)
MONOCYTES NFR BLD AUTO: 5 %
NEUTROPHILS # BLD AUTO: 12.4 10E3/UL (ref 1.6–8.3)
NEUTROPHILS NFR BLD AUTO: 88 %
NRBC # BLD AUTO: 0 10E3/UL
NRBC BLD AUTO-RTO: 0 /100
PLATELET # BLD AUTO: 148 10E3/UL (ref 150–450)
POTASSIUM SERPL-SCNC: 3.9 MMOL/L (ref 3.4–5.3)
PROT SERPL-MCNC: 5.6 G/DL (ref 6.4–8.3)
RBC # BLD AUTO: 3.43 10E6/UL (ref 3.8–5.2)
SODIUM SERPL-SCNC: 138 MMOL/L (ref 136–145)
WBC # BLD AUTO: 14 10E3/UL (ref 4–11)

## 2023-08-23 PROCEDURE — 258N000003 HC RX IP 258 OP 636: Performed by: INTERNAL MEDICINE

## 2023-08-23 PROCEDURE — 120N000001 HC R&B MED SURG/OB

## 2023-08-23 PROCEDURE — 250N000013 HC RX MED GY IP 250 OP 250 PS 637: Performed by: INTERNAL MEDICINE

## 2023-08-23 PROCEDURE — 99232 SBSQ HOSP IP/OBS MODERATE 35: CPT | Performed by: INTERNAL MEDICINE

## 2023-08-23 PROCEDURE — 250N000011 HC RX IP 250 OP 636: Mod: JZ | Performed by: INTERNAL MEDICINE

## 2023-08-23 PROCEDURE — 250N000011 HC RX IP 250 OP 636: Performed by: INTERNAL MEDICINE

## 2023-08-23 PROCEDURE — 250N000013 HC RX MED GY IP 250 OP 250 PS 637: Performed by: SURGERY

## 2023-08-23 PROCEDURE — 250N000013 HC RX MED GY IP 250 OP 250 PS 637: Performed by: PHYSICIAN ASSISTANT

## 2023-08-23 PROCEDURE — 80053 COMPREHEN METABOLIC PANEL: CPT | Performed by: INTERNAL MEDICINE

## 2023-08-23 PROCEDURE — 85014 HEMATOCRIT: CPT | Performed by: INTERNAL MEDICINE

## 2023-08-23 PROCEDURE — 250N000011 HC RX IP 250 OP 636: Mod: JZ | Performed by: SURGERY

## 2023-08-23 PROCEDURE — 36415 COLL VENOUS BLD VENIPUNCTURE: CPT | Performed by: INTERNAL MEDICINE

## 2023-08-23 RX ORDER — KETOROLAC TROMETHAMINE 15 MG/ML
15 INJECTION, SOLUTION INTRAMUSCULAR; INTRAVENOUS EVERY 6 HOURS PRN
Status: DISCONTINUED | OUTPATIENT
Start: 2023-08-23 | End: 2023-08-23

## 2023-08-23 RX ORDER — HYDROXYZINE HYDROCHLORIDE 50 MG/1
50 TABLET, FILM COATED ORAL EVERY 6 HOURS PRN
Status: DISCONTINUED | OUTPATIENT
Start: 2023-08-23 | End: 2023-08-26 | Stop reason: HOSPADM

## 2023-08-23 RX ORDER — LISINOPRIL 10 MG/1
10 TABLET ORAL DAILY
Status: DISCONTINUED | OUTPATIENT
Start: 2023-08-23 | End: 2023-08-26 | Stop reason: HOSPADM

## 2023-08-23 RX ORDER — KETOROLAC TROMETHAMINE 15 MG/ML
15 INJECTION, SOLUTION INTRAMUSCULAR; INTRAVENOUS ONCE
Status: COMPLETED | OUTPATIENT
Start: 2023-08-23 | End: 2023-08-23

## 2023-08-23 RX ORDER — SIMETHICONE 80 MG
80 TABLET,CHEWABLE ORAL 4 TIMES DAILY PRN
Status: DISCONTINUED | OUTPATIENT
Start: 2023-08-23 | End: 2023-08-26 | Stop reason: HOSPADM

## 2023-08-23 RX ORDER — HYDROXYZINE HYDROCHLORIDE 25 MG/1
25 TABLET, FILM COATED ORAL EVERY 6 HOURS PRN
Status: DISCONTINUED | OUTPATIENT
Start: 2023-08-23 | End: 2023-08-26 | Stop reason: HOSPADM

## 2023-08-23 RX ADMIN — OXYCODONE HYDROCHLORIDE 5 MG: 5 TABLET ORAL at 13:51

## 2023-08-23 RX ADMIN — ACETAMINOPHEN 650 MG: 325 TABLET, FILM COATED ORAL at 05:40

## 2023-08-23 RX ADMIN — LISINOPRIL 10 MG: 10 TABLET ORAL at 10:08

## 2023-08-23 RX ADMIN — FAMOTIDINE 20 MG: 10 INJECTION, SOLUTION INTRAVENOUS at 20:36

## 2023-08-23 RX ADMIN — SENNOSIDES AND DOCUSATE SODIUM 2 TABLET: 50; 8.6 TABLET ORAL at 20:36

## 2023-08-23 RX ADMIN — ACETAMINOPHEN 650 MG: 325 TABLET, FILM COATED ORAL at 11:32

## 2023-08-23 RX ADMIN — SENNOSIDES AND DOCUSATE SODIUM 2 TABLET: 50; 8.6 TABLET ORAL at 10:08

## 2023-08-23 RX ADMIN — FAMOTIDINE 20 MG: 10 INJECTION, SOLUTION INTRAVENOUS at 08:07

## 2023-08-23 RX ADMIN — SODIUM CHLORIDE, POTASSIUM CHLORIDE, SODIUM LACTATE AND CALCIUM CHLORIDE: 600; 310; 30; 20 INJECTION, SOLUTION INTRAVENOUS at 05:37

## 2023-08-23 RX ADMIN — OXYCODONE HYDROCHLORIDE 5 MG: 5 TABLET ORAL at 08:04

## 2023-08-23 RX ADMIN — PIPERACILLIN AND TAZOBACTAM 3.38 G: 3; .375 INJECTION, POWDER, FOR SOLUTION INTRAVENOUS at 14:19

## 2023-08-23 RX ADMIN — PIPERACILLIN AND TAZOBACTAM 3.38 G: 3; .375 INJECTION, POWDER, FOR SOLUTION INTRAVENOUS at 20:50

## 2023-08-23 RX ADMIN — POLYETHYLENE GLYCOL 3350 17 G: 17 POWDER, FOR SOLUTION ORAL at 17:46

## 2023-08-23 RX ADMIN — HYDROMORPHONE HYDROCHLORIDE 0.3 MG: 1 INJECTION, SOLUTION INTRAMUSCULAR; INTRAVENOUS; SUBCUTANEOUS at 20:36

## 2023-08-23 RX ADMIN — OXYCODONE HYDROCHLORIDE 5 MG: 5 TABLET ORAL at 02:49

## 2023-08-23 RX ADMIN — PIPERACILLIN AND TAZOBACTAM 3.38 G: 3; .375 INJECTION, POWDER, FOR SOLUTION INTRAVENOUS at 10:08

## 2023-08-23 RX ADMIN — SODIUM CHLORIDE, POTASSIUM CHLORIDE, SODIUM LACTATE AND CALCIUM CHLORIDE 100 ML/HR: 600; 310; 30; 20 INJECTION, SOLUTION INTRAVENOUS at 17:47

## 2023-08-23 RX ADMIN — KETOROLAC TROMETHAMINE 15 MG: 15 INJECTION, SOLUTION INTRAMUSCULAR; INTRAVENOUS at 22:18

## 2023-08-23 RX ADMIN — ACETAMINOPHEN 650 MG: 325 TABLET, FILM COATED ORAL at 17:46

## 2023-08-23 RX ADMIN — PIPERACILLIN AND TAZOBACTAM 3.38 G: 3; .375 INJECTION, POWDER, FOR SOLUTION INTRAVENOUS at 02:52

## 2023-08-23 ASSESSMENT — ACTIVITIES OF DAILY LIVING (ADL)
ADLS_ACUITY_SCORE: 22

## 2023-08-23 NOTE — PLAN OF CARE
Assumed care [id number]. A&Ox4. Ax1 with a gait belt and walker when OOB. On RA. On a full liquid diet. PIV in R hand is infusing LR 100mL/hr. Lap sites x5 CDI. Reports of abdominal pain. Given PRN Tylenol x1 & PRN dilaudid x1 with reports of relief. Fresh ice pack replaced throughout the night. B, 130, 120.

## 2023-08-23 NOTE — PROGRESS NOTES
[facility]    Medicine Progress Note - Hospitalist Service    Date of Admission:  8/20/2023    Assessment & Plan      Marivel Hernandez is a 68 year old female with a history of HTN, Migraine HA, CKD, Osteopenia who presents to the the hospital with symptomatology of abdominal pain, nausea and emesis.      Acute Gallstone Pancreatitis  Anticipated ileus  SIRS versus early Sepsis - tachycardic, afebrile, wbc 19.7, lactic acid 4.0, lipase >3k, Tbili 2.4, alk phos 294, , .  Meeting sirs criteria with concern for sepsis from gallbladder.  Cholangitis appears less likely based on imaging.  ED discussed with GI and started on empiric Zosyn during admission process  - CT abd/pelvis revealed hepatic steatosis, changes c/w acute pancreatitis with no evidence for pancreatic necrosis and no peripancreatic fluid collection.Cholelithiasis with nonspecific gallbladder wall thickening and no CT evidence of choledocholithiasis.  - abd U/S revealed cholelithiasis with gallbladder wall thickening and negative sonographic Hou sign  -Tolerated laparoscopic cholecystectomy last 8/21/2023  -Appreciate prompt and extensive input from GI and general surgery  -LFTs trending still showing improvement  -Currently on liquid diet.  Will defer advancement of diet to the general surgery service  -Optimize pain control  -Continue IV fluid support as she is only on minimal clear liquids right now  -Continue IV Pepcid      LIZ on CKD resolved  - IVF hydration       HTN   -Home regimen lisinopril has been resumed       Hyperglycemia - glucose on admission 250.  Anion gap and bicarb wnl.  No prior hx of diabetes.  -A1c at 5.8  - start ISS protocol  -Not diabetic but needs close follow-up, unsure if she has underlying prediabetes or chills secondary from underlying illness with pancreatitis     Incidental Findings - noted on CT imaging on admission:  - multiple bilateral indeterminate adrenal nodules favored to  "represent adenomas. Recommend nonemergent follow-up with adrenal CT.    - Liver segment 6 lesion most likely representing a cavernous hemangioma. It is slightly increased in size since 2010.     Hypercalcemia from dehydration  -Resolved     CODE: full  Diet/IVF: npo, LR  GI ppx:  pepcid  DVT ppx: SCD     Diet: Advance Diet as Tolerated: Full Liquid Diet    DVT Prophylaxis: Pneumatic Compression Devices  Baez Catheter: Not present  Lines: None     Cardiac Monitoring: None  Code Status: Full Code      Clinically Significant Risk Factors              # Hypoalbuminemia: Lowest albumin = 2.6 g/dL at 8/23/2023  7:23 AM, will monitor as appropriate       # Hypertension: Noted on problem list        # Obesity: Estimated body mass index is 32.8 kg/m  as calculated from the following:    Height as of this encounter: 1.6 m (5' 3\").    Weight as of this encounter: 84 kg (185 lb 3 oz)., PRESENT ON ADMISSION            Disposition Plan      Expected Discharge Date: Anticipating at least 1-2 more inpatient days.  Awaiting resumption of bowel function  Destination: home            Rafi Casillas MD, MD  Hospitalist Service  [facility]  Securely message with [facility] (more info)  Text page via [employer] Paging/Directory   ______________________________________________________________________    Interval History     Continuing medicine service care today.  Seen and examined.  Chart reviewed.  Case discussed with nursing service as they were gracious to be present during the time of my encounter.  She still having intermittent abdominal bloating and discomfort  Able to tolerate clear liquids  -No reported fever spikes and not requiring any oxygen support  -Ambulatory with no assistance  -No significant reported mental status changes overnight      Physical Exam   Vital Signs: Temp: 98.6  F (37  C) Temp src: Oral BP: (!) 154/83 Pulse: 87   Resp: 16 SpO2: 92 % O2 Device: None (Room air)    Weight: 185 lbs 2.98 " oz    HEENT; Atraumatic, normocephalic, pinkish conjuctiva, pupils bilateral reactive   Skin: warm and moist, no rashes  Lymphatics: no cervical or axillary lymphandenopathy  Lungs: equal chest expansion, clear to auscultation, no wheezes, no stridor, no crackles,   Heart: normal rate, normal rhythm, no rubs or gallops.   Abdomen: Scant bowel sounds, mildly tender upon palpation mostly at epigastric area and right upper quadrant , no guarding   extremities: no deformities, no edema   Neuro; follow commands, alert and oriented x3, spontaneous speech, coherent, moves all extremities spontaneously  Psych; no hallucination, euthymic mood, not agitated      Medical Decision Making       50 MINUTES SPENT BY ME on the date of service doing chart review, history, exam, documentation & further activities per the note.  MANAGEMENT DISCUSSED with the following over the past 24 hours: Yes   NOTE(S)/MEDICAL RECORDS REVIEWED over the past 24 hours: Yes       Data     I have personally reviewed the following data over the past 24 hrs:    14.0 (H)  \   10.6 (L)   / 148 (L)     138 105 10.5 /  109 (H)   3.9 25 0.89 \     ALT: 112 (H) AST: 49 (H) AP: 120 (H) TBILI: 0.8   ALB: 2.6 (L) TOT PROTEIN: 5.6 (L) LIPASE: N/A     Imaging results reviewed over the past 24 hrs:   No results found for this or any previous visit (from the past 24 hour(s)).

## 2023-08-23 NOTE — PROGRESS NOTES
"[facility]   General Surgery Progress Note           Assessment and Plan:   Assessment:   2 Days Post-Op  s/p Procedure(s):  CHOLECYSTECTOMY, LAPAROSCOPIC, WITH CHOLANGIOGRAM  Ileus as expected given acute pancreatitis  LFTs trending down  WBC trending down      Plan:   -CLD until bloating improved  -simethicone added for bloating discomfort  -pain control: Tylenol, Oxcodone.   -bowel regimen: daily Miralax, BID Senokot  -walk halls 3-4 times per day  -continue IV abx (Zosyn) until WBC normal  -dispo pending resolution of ileus and improvement in leukocytosis   Overall improving, seen today while up walking the hallways. Agree with above. States no flatus yet but feeling movement. ADAT once passing flatus      Interval History:   Afebrile. C/o bloating pain today as well as back pain, controlled with PO meds. Poor appetite and no flatus yet. Walked halls x 3 yesterday.          Physical Exam:   Blood pressure (!) 154/83, pulse 87, temperature 98.6  F (37  C), temperature source Oral, resp. rate 16, height 1.6 m (5' 3\"), weight 84 kg (185 lb 3 oz), SpO2 92 %, not currently breastfeeding.    I/O last 3 completed shifts:  In: 1913 [P.O.:940; I.V.:973]  Out: 1125 [Urine:1125]    Abdomen:   soft, non-distended, non-tender and absent bowel sounds   Inc(s) - clean, dry, intact           Data:     Recent Labs   Lab 08/23/23  0723 08/22/23  0641 08/21/23  0607   WBC 14.0* 16.9* 15.6*   HGB 10.6* 11.7 13.9   HCT 32.9* 36.3 42.8   MCV 96 97 95   * 163 198     Recent Labs   Lab 08/23/23  0723 08/22/23  0641 08/21/23  0607   AST 49* 91* 207*   * 201* 455*   ALKPHOS 120* 140* 195*   BILITOTAL 0.8 1.0 0.9       Chelsea Gerardo PA-C  Text page: [phone] 8 am to 4 pm  After 4 pm, call [phone]     "

## 2023-08-23 NOTE — PLAN OF CARE
Goal Outcome Evaluation:      Plan of Care Reviewed With: patient, spouse    Shift: 7am-7pm    Vitals: Temp: 99  F (37.2  C) Temp src: Oral BP: (!) 161/83 Pulse: 97   Resp: 20 SpO2: 92 % O2 Device: None (Room air)       Orientation: x4, sleepy, sleeps between cares  Pain: 8-9 pain in back and abdomen, PRN tylenol and oxycodone given without relief   Activity: assist of 1 with gait belt and walker, ambulated in garcía and to bathroom, steady gait, refused oral cares and hygiene care due to feeling too tired   Resp: clear and diminished, on RA   Diet: very little intake, no appetite, increased oral fluids   How to take meds: whole with fluids   GI: no bm this shift, no flatulence, active bowel sounds   : continent, voids spontaneously with no difficulty    BG: ACHS checks, 109 & 99 & 104   Skin: surgical incisions on abdomen covered with clear liquid bandage, no drainage   Lines: IV running with LR on right arm   Plan: discharge home in 1-2 days depending on resumption of bowel function

## 2023-08-24 LAB
ANION GAP SERPL CALCULATED.3IONS-SCNC: 8 MMOL/L (ref 7–15)
BASOPHILS # BLD AUTO: 0 10E3/UL (ref 0–0.2)
BASOPHILS NFR BLD AUTO: 0 %
BUN SERPL-MCNC: 10 MG/DL (ref 8–23)
CALCIUM SERPL-MCNC: 8.5 MG/DL (ref 8.8–10.2)
CHLORIDE SERPL-SCNC: 103 MMOL/L (ref 98–107)
CREAT SERPL-MCNC: 0.92 MG/DL (ref 0.51–0.95)
DEPRECATED HCO3 PLAS-SCNC: 26 MMOL/L (ref 22–29)
EOSINOPHIL # BLD AUTO: 0 10E3/UL (ref 0–0.7)
EOSINOPHIL NFR BLD AUTO: 0 %
ERYTHROCYTE [DISTWIDTH] IN BLOOD BY AUTOMATED COUNT: 13.7 % (ref 10–15)
GFR SERPL CREATININE-BSD FRML MDRD: 68 ML/MIN/1.73M2
GLUCOSE BLDC GLUCOMTR-MCNC: 103 MG/DL (ref 70–99)
GLUCOSE BLDC GLUCOMTR-MCNC: 110 MG/DL (ref 70–99)
GLUCOSE BLDC GLUCOMTR-MCNC: 134 MG/DL (ref 70–99)
GLUCOSE BLDC GLUCOMTR-MCNC: 89 MG/DL (ref 70–99)
GLUCOSE SERPL-MCNC: 108 MG/DL (ref 70–99)
HCT VFR BLD AUTO: 33.2 % (ref 35–47)
HGB BLD-MCNC: 10.7 G/DL (ref 11.7–15.7)
IMM GRANULOCYTES # BLD: 0.1 10E3/UL
IMM GRANULOCYTES NFR BLD: 1 %
LYMPHOCYTES # BLD AUTO: 1 10E3/UL (ref 0.8–5.3)
LYMPHOCYTES NFR BLD AUTO: 6 %
MCH RBC QN AUTO: 30.9 PG (ref 26.5–33)
MCHC RBC AUTO-ENTMCNC: 32.2 G/DL (ref 31.5–36.5)
MCV RBC AUTO: 96 FL (ref 78–100)
MONOCYTES # BLD AUTO: 1 10E3/UL (ref 0–1.3)
MONOCYTES NFR BLD AUTO: 6 %
NEUTROPHILS # BLD AUTO: 14.3 10E3/UL (ref 1.6–8.3)
NEUTROPHILS NFR BLD AUTO: 87 %
NRBC # BLD AUTO: 0 10E3/UL
NRBC BLD AUTO-RTO: 0 /100
PLATELET # BLD AUTO: 191 10E3/UL (ref 150–450)
POTASSIUM SERPL-SCNC: 3.5 MMOL/L (ref 3.4–5.3)
RBC # BLD AUTO: 3.46 10E6/UL (ref 3.8–5.2)
SODIUM SERPL-SCNC: 137 MMOL/L (ref 136–145)
WBC # BLD AUTO: 16.4 10E3/UL (ref 4–11)

## 2023-08-24 PROCEDURE — 99232 SBSQ HOSP IP/OBS MODERATE 35: CPT | Performed by: INTERNAL MEDICINE

## 2023-08-24 PROCEDURE — 36415 COLL VENOUS BLD VENIPUNCTURE: CPT | Performed by: INTERNAL MEDICINE

## 2023-08-24 PROCEDURE — 250N000011 HC RX IP 250 OP 636: Mod: JZ | Performed by: INTERNAL MEDICINE

## 2023-08-24 PROCEDURE — 250N000013 HC RX MED GY IP 250 OP 250 PS 637: Performed by: INTERNAL MEDICINE

## 2023-08-24 PROCEDURE — 250N000011 HC RX IP 250 OP 636: Mod: JZ | Performed by: SURGERY

## 2023-08-24 PROCEDURE — 82310 ASSAY OF CALCIUM: CPT | Performed by: INTERNAL MEDICINE

## 2023-08-24 PROCEDURE — 250N000011 HC RX IP 250 OP 636: Performed by: INTERNAL MEDICINE

## 2023-08-24 PROCEDURE — 120N000001 HC R&B MED SURG/OB

## 2023-08-24 PROCEDURE — 258N000003 HC RX IP 258 OP 636: Performed by: INTERNAL MEDICINE

## 2023-08-24 PROCEDURE — 85025 COMPLETE CBC W/AUTO DIFF WBC: CPT | Performed by: INTERNAL MEDICINE

## 2023-08-24 RX ORDER — POLYETHYLENE GLYCOL 3350 17 G/17G
17 POWDER, FOR SOLUTION ORAL DAILY PRN
Status: DISCONTINUED | OUTPATIENT
Start: 2023-08-24 | End: 2023-08-26 | Stop reason: HOSPADM

## 2023-08-24 RX ORDER — KETOROLAC TROMETHAMINE 15 MG/ML
15 INJECTION, SOLUTION INTRAMUSCULAR; INTRAVENOUS EVERY 6 HOURS PRN
Status: DISPENSED | OUTPATIENT
Start: 2023-08-24 | End: 2023-08-25

## 2023-08-24 RX ADMIN — PIPERACILLIN AND TAZOBACTAM 3.38 G: 3; .375 INJECTION, POWDER, FOR SOLUTION INTRAVENOUS at 08:40

## 2023-08-24 RX ADMIN — KETOROLAC TROMETHAMINE 15 MG: 15 INJECTION, SOLUTION INTRAMUSCULAR; INTRAVENOUS at 11:42

## 2023-08-24 RX ADMIN — SODIUM CHLORIDE, POTASSIUM CHLORIDE, SODIUM LACTATE AND CALCIUM CHLORIDE: 600; 310; 30; 20 INJECTION, SOLUTION INTRAVENOUS at 05:44

## 2023-08-24 RX ADMIN — HYDROXYZINE HYDROCHLORIDE 25 MG: 25 TABLET, FILM COATED ORAL at 02:24

## 2023-08-24 RX ADMIN — LISINOPRIL 10 MG: 10 TABLET ORAL at 08:39

## 2023-08-24 RX ADMIN — HYDROMORPHONE HYDROCHLORIDE 0.3 MG: 1 INJECTION, SOLUTION INTRAMUSCULAR; INTRAVENOUS; SUBCUTANEOUS at 04:45

## 2023-08-24 RX ADMIN — FAMOTIDINE 20 MG: 10 INJECTION, SOLUTION INTRAVENOUS at 19:40

## 2023-08-24 RX ADMIN — HYDROXYZINE HYDROCHLORIDE 25 MG: 25 TABLET, FILM COATED ORAL at 08:39

## 2023-08-24 RX ADMIN — PIPERACILLIN AND TAZOBACTAM 3.38 G: 3; .375 INJECTION, POWDER, FOR SOLUTION INTRAVENOUS at 19:56

## 2023-08-24 RX ADMIN — FAMOTIDINE 20 MG: 10 INJECTION, SOLUTION INTRAVENOUS at 08:39

## 2023-08-24 RX ADMIN — KETOROLAC TROMETHAMINE 15 MG: 15 INJECTION, SOLUTION INTRAMUSCULAR; INTRAVENOUS at 18:11

## 2023-08-24 RX ADMIN — PIPERACILLIN AND TAZOBACTAM 3.38 G: 3; .375 INJECTION, POWDER, FOR SOLUTION INTRAVENOUS at 14:35

## 2023-08-24 RX ADMIN — HYDROMORPHONE HYDROCHLORIDE 0.3 MG: 1 INJECTION, SOLUTION INTRAMUSCULAR; INTRAVENOUS; SUBCUTANEOUS at 08:38

## 2023-08-24 RX ADMIN — PIPERACILLIN AND TAZOBACTAM 3.38 G: 3; .375 INJECTION, POWDER, FOR SOLUTION INTRAVENOUS at 02:24

## 2023-08-24 RX ADMIN — OXYCODONE HYDROCHLORIDE 5 MG: 5 TABLET ORAL at 19:40

## 2023-08-24 ASSESSMENT — ACTIVITIES OF DAILY LIVING (ADL)
ADLS_ACUITY_SCORE: 21
ADLS_ACUITY_SCORE: 22
ADLS_ACUITY_SCORE: 21
ADLS_ACUITY_SCORE: 22
ADLS_ACUITY_SCORE: 21

## 2023-08-24 NOTE — PLAN OF CARE
Shift Summary Note  1900-0730    Orientation: AOX4  VS: VSS, temp slightly elevated but afebrile  LS: O2 sats stable on RA  Pain: reported pain 8-9 in abdomen and lower back not effectively managed. PRNs given and adjuvants request from X Cover, repositioned with pillow support. Pt. States interventions effective.  Activity:  A1 GBW  LDAs: R PIV infusing LR @ 100cc/hr  Diet: Full Liquid             Plan: help promote improved bowel function, no BM or gas since admission, control pain  Discharge: Home timing TBD      Linda Nuno, RN  August 23, 2023           Goal Outcome Evaluation:      Plan of Care Reviewed With: patient    Overall Patient Progress: improving  Overall Patient Progress: improving

## 2023-08-24 NOTE — PROGRESS NOTES
[facility]  Hospitalist Progress Note  Jose Cutler MD 08/24/2023    Reason for Stay (Diagnosis): pancreatitis, s/p lap kimmy         Assessment and Plan:      Summary of Stay: Marivel Hernandez is a 68 year old female with a history of HTN, Migraine HA, CKD, Osteopenia who presents to the the hospital with symptomatology of abdominal pain, nausea and emesis.    Pt was found to have gallstone pancreatitis.  She underwent lap kimmy this admit.  Post op course complicated by ileus that is now resolving    Plans today:  - no significant changes made today  - encourage ambulation.  Ordered to mobilize TID  - diet per surgery  - remains on antibiotics until WBC normalized        Acute Gallstone Pancreatitis  - s/p laparoscopic cholecystectomy 8/21/2023  - diet being slowly advanced    Ileus due to pancreatitis  - resolving.  Multiple BM the past 24 hours    SIRS versus early Sepsis (resolved)  - CT abd/pelvis revealed hepatic steatosis, changes c/w acute pancreatitis with no evidence for pancreatic necrosis and no peripancreatic fluid collection.Cholelithiasis with nonspecific gallbladder wall thickening and no CT evidence of choledocholithiasis.  - abd U/S revealed cholelithiasis with gallbladder wall thickening and negative sonographic Hou sign  - surgery recommends to continue IV Zosyn until WBC normalizes     LIZ on CKD - resolved    HTN   -Home regimen lisinopril has been resumed     Hyperglycemia   - suspect due to stress response from pancreatitis/SIRS  - No prior hx of diabetes.  -A1c at 5.8     Incidental Findings - noted on CT imaging on admission:  - multiple bilateral indeterminate adrenal nodules favored to represent adenomas. Recommend nonemergent follow-up with adrenal CT.    - Liver segment 6 lesion most likely representing a cavernous hemangioma. It is slightly increased in size since 2010.     DVT Prophylaxis: Pneumatic Compression Devices  Baez Catheter: Not present  Lines:  "None     Cardiac Monitoring: None  Code Status: Full Code     DISPO:  when tolerating diet with adequate caloric intake.  1-2 days likely        Interval History (Subjective):      Oral intake slowly improving.  Had several BM past 24 hours.  Slowly feeling better                  Physical Exam:      Last Vital Signs:  BP (!) 152/84 (BP Location: Left arm)   Pulse 89   Temp 98.3  F (36.8  C) (Oral)   Resp 18   Ht 1.6 m (5' 3\")   Wt 84 kg (185 lb 3 oz)   SpO2 96%   BMI 32.80 kg/m        Intake/Output Summary (Last 24 hours) at 8/24/2023 1218  Last data filed at 8/24/2023 1159  Gross per 24 hour   Intake 1080 ml   Output 2250 ml   Net -1170 ml       Constitutional: Awake, alert, cooperative, no apparent distress     Respiratory: Clear to auscultation bilaterally, no crackles or wheezing   Cardiovascular: Regular rate and rhythm, normal S1 and S2, and no murmur noted   Abdomen: Normal bowel sounds, soft, non-distended, non-tender   Skin: No rashes, no cyanosis, dry to touch   Neuro: Alert and oriented x3, no weakness, numbness, memory loss   Extremities: No edema, normal range of motion   Other(s):        All other systems: Negative          Medications:      All current medications were reviewed with changes reflected in problem list.         Data:      All new lab and imaging data was reviewed.   Labs:  Recent Labs   Lab 08/24/23  0756   WBC 16.4*   HGB 10.7*   HCT 33.2*   MCV 96         Imaging:   No results found for this or any previous visit (from the past 24 hour(s)).   "

## 2023-08-24 NOTE — PROGRESS NOTES
"[facility]   General Surgery Progress Note           Assessment and Plan:   Assessment:   3 Days Post-Op  s/p Procedure(s):  CHOLECYSTECTOMY, LAPAROSCOPIC, WITH CHOLANGIOGRAM  Ileus as expected - resolving  LFTs trending down  Leukocytosis 16.4      Plan:   Leukocytosis - wbc back up, continue to monitor  Pain mgmt: IV dilaudid, Oxycodone, Tylenol, Atarax available prn  Bowel: senna-s  Diet: full liquids   IVFs: LR @ 100 mL/hr  Activity: encourage ambulation 3-4 x daily  Antibiotics: Zosyn  DVT prophylaxis: ambulation, PCD   Dispo: 1-2 days depending on return of bowel function and diet tolerance            Interval History:   Resting in chair.  Feeling a little better today.  Bloating is much improved.  Has several BM's last night.  Feeling a little bit more hungry.  Pain controlled         Physical Exam:   Blood pressure (!) 152/84, pulse 89, temperature 98.3  F (36.8  C), temperature source Oral, resp. rate 18, height 1.6 m (5' 3\"), weight 84 kg (185 lb 3 oz), SpO2 96 %, not currently breastfeeding.    I/O last 3 completed shifts:  In: 1280 [P.O.:1280]  Out: 2050 [Urine:2050]    Abdomen:   soft, non tender  Incisions - surgical glue in place - clean, dry, intact           Data:     Recent Labs   Lab 08/24/23  0756 08/23/23  0723 08/22/23  0641   WBC 16.4* 14.0* 16.9*   HGB 10.7* 10.6* 11.7   HCT 33.2* 32.9* 36.3   MCV 96 96 97    148* 163     Recent Labs   Lab 08/23/23  0723 08/22/23  0641 08/21/23  0607   AST 49* 91* 207*   * 201* 455*   ALKPHOS 120* 140* 195*   BILITOTAL 0.8 1.0 0.9       LALIT Landa [facility]  Text page: [phone]  8-4 pm   After 4 pm call [phone]                      "

## 2023-08-24 NOTE — PLAN OF CARE
Neuro: A&Ox4.   Cardiac: HR regular. VSS.   Respiratory: Sating >92% on RA.  GI/: Adequate urine output. Loose BMs.   Diet/appetite: Tolerating Full liquid diet. Eating fair.   Activity:  Assist of SBA, up to chair and in room..  Pain: At acceptable level on current regimen. Dilaudid 0.3 X1. Toradol 15mg X1.   Skin: No new deficits noted.-see flowsheet.   LDA's:R PIV-LR@100ml/hr.     Plan: Toradol added PRN, and improved pts pain control. Encouraged out of bed and ambulation. Updated care plan.

## 2023-08-25 LAB
BACTERIA BLD CULT: NO GROWTH
BACTERIA BLD CULT: NO GROWTH
BASOPHILS # BLD AUTO: 0 10E3/UL (ref 0–0.2)
BASOPHILS NFR BLD AUTO: 0 %
EOSINOPHIL # BLD AUTO: 0.1 10E3/UL (ref 0–0.7)
EOSINOPHIL NFR BLD AUTO: 1 %
ERYTHROCYTE [DISTWIDTH] IN BLOOD BY AUTOMATED COUNT: 13.5 % (ref 10–15)
GLUCOSE BLDC GLUCOMTR-MCNC: 112 MG/DL (ref 70–99)
GLUCOSE BLDC GLUCOMTR-MCNC: 135 MG/DL (ref 70–99)
GLUCOSE BLDC GLUCOMTR-MCNC: 147 MG/DL (ref 70–99)
GLUCOSE BLDC GLUCOMTR-MCNC: 155 MG/DL (ref 70–99)
GLUCOSE BLDC GLUCOMTR-MCNC: 98 MG/DL (ref 70–99)
HCT VFR BLD AUTO: 30.7 % (ref 35–47)
HGB BLD-MCNC: 10.1 G/DL (ref 11.7–15.7)
IMM GRANULOCYTES # BLD: 0.1 10E3/UL
IMM GRANULOCYTES NFR BLD: 1 %
LYMPHOCYTES # BLD AUTO: 0.8 10E3/UL (ref 0.8–5.3)
LYMPHOCYTES NFR BLD AUTO: 7 %
MCH RBC QN AUTO: 30.9 PG (ref 26.5–33)
MCHC RBC AUTO-ENTMCNC: 32.9 G/DL (ref 31.5–36.5)
MCV RBC AUTO: 94 FL (ref 78–100)
MONOCYTES # BLD AUTO: 0.9 10E3/UL (ref 0–1.3)
MONOCYTES NFR BLD AUTO: 8 %
NEUTROPHILS # BLD AUTO: 9.3 10E3/UL (ref 1.6–8.3)
NEUTROPHILS NFR BLD AUTO: 83 %
NRBC # BLD AUTO: 0 10E3/UL
NRBC BLD AUTO-RTO: 0 /100
PLATELET # BLD AUTO: 163 10E3/UL (ref 150–450)
RBC # BLD AUTO: 3.27 10E6/UL (ref 3.8–5.2)
WBC # BLD AUTO: 11.2 10E3/UL (ref 4–11)

## 2023-08-25 PROCEDURE — 250N000011 HC RX IP 250 OP 636: Mod: JZ | Performed by: INTERNAL MEDICINE

## 2023-08-25 PROCEDURE — 36415 COLL VENOUS BLD VENIPUNCTURE: CPT | Performed by: INTERNAL MEDICINE

## 2023-08-25 PROCEDURE — 250N000013 HC RX MED GY IP 250 OP 250 PS 637: Performed by: INTERNAL MEDICINE

## 2023-08-25 PROCEDURE — 250N000013 HC RX MED GY IP 250 OP 250 PS 637: Performed by: PHYSICIAN ASSISTANT

## 2023-08-25 PROCEDURE — 120N000001 HC R&B MED SURG/OB

## 2023-08-25 PROCEDURE — 258N000003 HC RX IP 258 OP 636: Performed by: INTERNAL MEDICINE

## 2023-08-25 PROCEDURE — 85014 HEMATOCRIT: CPT | Performed by: INTERNAL MEDICINE

## 2023-08-25 PROCEDURE — 250N000013 HC RX MED GY IP 250 OP 250 PS 637: Performed by: SURGERY

## 2023-08-25 PROCEDURE — 250N000011 HC RX IP 250 OP 636: Mod: JZ | Performed by: SURGERY

## 2023-08-25 PROCEDURE — 99232 SBSQ HOSP IP/OBS MODERATE 35: CPT | Performed by: INTERNAL MEDICINE

## 2023-08-25 RX ORDER — FAMOTIDINE 20 MG/1
20 TABLET, FILM COATED ORAL 2 TIMES DAILY
Status: DISCONTINUED | OUTPATIENT
Start: 2023-08-25 | End: 2023-08-26 | Stop reason: HOSPADM

## 2023-08-25 RX ORDER — MULTIPLE VITAMINS W/ MINERALS TAB 9MG-400MCG
1 TAB ORAL DAILY
Status: DISCONTINUED | OUTPATIENT
Start: 2023-08-25 | End: 2023-08-26 | Stop reason: HOSPADM

## 2023-08-25 RX ADMIN — ACETAMINOPHEN 650 MG: 325 TABLET, FILM COATED ORAL at 08:47

## 2023-08-25 RX ADMIN — ACETAMINOPHEN 650 MG: 325 TABLET, FILM COATED ORAL at 15:55

## 2023-08-25 RX ADMIN — OXYCODONE HYDROCHLORIDE 5 MG: 5 TABLET ORAL at 20:33

## 2023-08-25 RX ADMIN — FAMOTIDINE 20 MG: 20 TABLET, FILM COATED ORAL at 10:21

## 2023-08-25 RX ADMIN — LISINOPRIL 10 MG: 10 TABLET ORAL at 08:51

## 2023-08-25 RX ADMIN — MULTIPLE VITAMINS W/ MINERALS TAB 1 TABLET: TAB at 17:18

## 2023-08-25 RX ADMIN — FAMOTIDINE 20 MG: 20 TABLET, FILM COATED ORAL at 20:33

## 2023-08-25 RX ADMIN — OXYCODONE HYDROCHLORIDE 5 MG: 5 TABLET ORAL at 23:42

## 2023-08-25 RX ADMIN — PIPERACILLIN AND TAZOBACTAM 3.38 G: 3; .375 INJECTION, POWDER, FOR SOLUTION INTRAVENOUS at 01:57

## 2023-08-25 RX ADMIN — PIPERACILLIN AND TAZOBACTAM 3.38 G: 3; .375 INJECTION, POWDER, FOR SOLUTION INTRAVENOUS at 10:26

## 2023-08-25 RX ADMIN — OXYCODONE HYDROCHLORIDE 5 MG: 5 TABLET ORAL at 17:30

## 2023-08-25 RX ADMIN — SENNOSIDES AND DOCUSATE SODIUM 2 TABLET: 50; 8.6 TABLET ORAL at 20:33

## 2023-08-25 RX ADMIN — SODIUM CHLORIDE, POTASSIUM CHLORIDE, SODIUM LACTATE AND CALCIUM CHLORIDE: 600; 310; 30; 20 INJECTION, SOLUTION INTRAVENOUS at 05:11

## 2023-08-25 RX ADMIN — OXYCODONE HYDROCHLORIDE 5 MG: 5 TABLET ORAL at 10:31

## 2023-08-25 RX ADMIN — KETOROLAC TROMETHAMINE 15 MG: 15 INJECTION, SOLUTION INTRAMUSCULAR; INTRAVENOUS at 01:57

## 2023-08-25 RX ADMIN — HYDROXYZINE HYDROCHLORIDE 25 MG: 25 TABLET, FILM COATED ORAL at 13:45

## 2023-08-25 RX ADMIN — SIMETHICONE 80 MG: 80 TABLET, CHEWABLE ORAL at 17:31

## 2023-08-25 ASSESSMENT — ACTIVITIES OF DAILY LIVING (ADL)
ADLS_ACUITY_SCORE: 22
ADLS_ACUITY_SCORE: 22
ADLS_ACUITY_SCORE: 21
ADLS_ACUITY_SCORE: 22
ADLS_ACUITY_SCORE: 21
ADLS_ACUITY_SCORE: 21
ADLS_ACUITY_SCORE: 22
ADLS_ACUITY_SCORE: 24
ADLS_ACUITY_SCORE: 21
ADLS_ACUITY_SCORE: 22
ADLS_ACUITY_SCORE: 24
ADLS_ACUITY_SCORE: 21

## 2023-08-25 NOTE — PLAN OF CARE
/72  Pulse 81   Temp 98.5  F  Resp 16  SpO2 94%     Patient is alert and oriented x4, had pain and toradol was given, patient pulled out IV on accident. Attempt was made to put a new one in but was unable to get it. Currently saline locked. Patient is SBA with walker, continent of bowel and bladder. Slept well throughout the shift.

## 2023-08-25 NOTE — PLAN OF CARE
VSS, afeb., LS are clear, 95%on RA. Pt feels very weak and lethargic, but was able to ambulate x2 this shift in halls.Denies SOB at rest or with activity. Pt abd slightly distaended, BS pos to all quadrants, pt had 2 large loose bms today per her report. 5lap sites to abd with liquid bandge, CDI. Appetite is poor, taking sips of water and broth.Pt has had IV Torodal and po oxycodone for pain of 8/10, pain was reduced to 3 for both.  BG's were 105 & 134. Discharge to home when stable.

## 2023-08-25 NOTE — PROGRESS NOTES
"CLINICAL NUTRITION SERVICES - ASSESSMENT NOTE     Nutrition Prescription    RECOMMENDATIONS FOR MDs/PROVIDERS TO ORDER:  None    Malnutrition Status:    Moderate in the context of acute illness    Recommendations already ordered by Registered Dietitian (RD):  Chocolate Glucerna TID w/ meals  Multivitamin w/ minerals as pt is meeting < 75% nutrition needs    Future/Additional Recommendations:  Adjust supplements pending PO intake/pt preference  Removed chocolate and milk allergy from chart as pt routinely enjoys these items and no longer has any symptoms associated with them.        REASON FOR ASSESSMENT  Marivel Hernandez is a/an 68 year old female assessed by the dietitian for [facility]    Patient presents with SIRS w/ sepsis, HTN, acute gallstone pancreatitis    NUTRITION HISTORY  Pt stated her appetite is poor and has been eating < 50% usual intake since Sunday. Pt says she is supposedly intolerant to chocolate and dairy as they caused her migraines in the past, however, she still eats them without an issue. Pt takes a multivitamin daily. Stated UBW 183lbs.     CURRENT NUTRITION ORDERS  Diet: Regular  Intake/Tolerance: Pt eating 100% of meals per nursing records yet just advanced to regular diet today. Had 670kcals and 7g protein yesterday on a full liquid diet.     LABS  Labs reviewed: hgb 10.1, hematocrit 30.7, rbc 3.27, Hgb A1c 5.8    MEDICATIONS  Medications reviewed: pepcid, zosyn, lactated ringers    ANTHROPOMETRICS  Height: 160 cm (5' 3\")  Most Recent Weight: 84 kg (185 lb 3 oz)    IBW: 52.4 kg  BMI: Obesity Grade I BMI 30-34.9  Weight History:   Wt Readings from Last 30 Encounters:   08/22/23 84 kg (185 lb 3 oz)   08/24/22 83.9 kg (184 lb 14.4 oz)   07/05/21 77.9 kg (171 lb 11.2 oz)   10/05/20 83 kg (182 lb 14.4 oz)   02/27/20 (P) 80.9 kg (178 lb 6.4 oz)   11/14/19 80.8 kg (178 lb 1.6 oz)   09/26/19 80.7 kg (178 lb)   02/18/19 80.4 kg (177 lb 3.2 oz)   07/23/18 79.4 kg (175 lb)   12/23/16 75.3 kg (166 lb) "   09/03/14 73.8 kg (162 lb 11.2 oz)   01/09/09 74.8 kg (165 lb)   01/08/09 75.9 kg (167 lb 6.4 oz)   09/24/08 78.5 kg (173 lb)   08/15/07 70.8 kg (156 lb)   07/18/07 74.8 kg (165 lb)   02/14/07 71.4 kg (157 lb 8 oz)   09/21/05 78.9 kg (174 lb)   06/03/04 95 kg (209 lb 8 oz)   08/18/03 96.2 kg (212 lb)       Dosing Weight: 84 kg and 60.3kg AWB for protein/fluid    ASSESSED NUTRITION NEEDS  Estimated Energy Needs: 1620 kcals/day ([place] [address] Jeor)  Justification: Maintenance  Estimated Protein Needs: 72-96 grams protein/day (1.2 - 1.6 grams of pro/kg)  Justification: Obesity guidelines  Estimated Fluid Needs: 1369-2657 mL/day (25 - 30 mL/kg)   Justification: Maintenance    PHYSICAL FINDINGS  See malnutrition section below.  Dry skin, bruised arm/finger, surgical incisions abdomen       MALNUTRITION:  % Weight Loss:  None noted  % Intake:  </= 50% for >/= 5 days (severe malnutrition)  Subcutaneous Fat Loss:  None observed  Muscle Loss:  Temporal region moderate depletion, Clavicle bone region mild to moderate depletion, and Acromion bone region mild depletion  Fluid Retention:  Mild 1+    Malnutrition Diagnosis: Moderate malnutrition  In Context of:  Acute illness or injury    NUTRITION DIAGNOSIS  Malnutrition related to SIRS w/ sepsis, acute gallstone pancreatitis as evidenced by prolonged poor intake of < 50% estimated energy needs, mild to moderate muscle loss, and mild fluid accumulation      INTERVENTIONS  Implementation  Nutrition Education: Per provider order if indicated   Medical food supplement therapy  Multivitamin/mineral supplement therapy     Goals  Patient to consume % of nutritionally adequate meals three times per day, or the equivalent with supplements/snacks.  Total avg nutritional intake to meet a minimum of 1620 kcal/kg and 72 g PRO/kg daily (per dosing wt 84 kg).     Monitoring/Evaluation  Progress toward goals will be monitored and evaluated per protocol. PO intake, supplement tolerance, wt,  labs

## 2023-08-25 NOTE — PLAN OF CARE
"Goal Outcome Evaluation:      Plan of Care Reviewed With: patient       Pt A&O x 4. VSS except BP slightly elevated and provider was notified. Up with SBA. On room air, LS clear, BS+. C/o abdominal discomfort and tenderness. C/o back pain. PRN Tylenol 650 mg x 1, oxycodone 5 mg x 1 and Atarax 25 mg given. Had some pain relief. Ambulated on garcía way, Has 5 abdominal lab sites that are opened to air and WDL. PIV infusing LR @ 50 mL/h. Tolerating regular diet. Denies nausea or SOB. Had a shower done today    BP (!) 172/84 (BP Location: Left arm)   Pulse 84   Temp 98.9  F (37.2  C) (Oral)   Resp 20   Ht 1.6 m (5' 3\")   Wt 84 kg (185 lb 3 oz)   SpO2 96%   BMI 32.80 kg/m               "

## 2023-08-25 NOTE — PLAN OF CARE
Goal Outcome Evaluation:    Pt meeting < 50% estimated needs for 5 days. Ordered Chocolate Glucerna TID w/ meals, Multivitamin w/ minerals as pt is meeting < 75% nutrition needs

## 2023-08-25 NOTE — PROGRESS NOTES
[facility]  Hospitalist Progress Note  Jose Cutler MD 08/25/2023    Reason for Stay (Diagnosis): gallstone pancreatitis, s/p lap kimmy         Assessment and Plan:      Summary of Stay: Marivel Hernandez is a 68 year old female with a history of HTN, Migraine HA, CKD, Osteopenia who presents to the the hospital with symptomatology of abdominal pain, nausea and emesis.     Pt was found to have gallstone pancreatitis.  She underwent lap kimmy this admit.  Post op course complicated by ileus that is now slowly resolving.  Appetite remains sub-optimal and continues to have some abd pain     Plans today:  - no significant changes made today  - encourage ambulation.  she is mobilizing in hallways.  I recommend at least TID  - diet per surgery  - remains on antibiotics until WBC normalized        Acute Gallstone Pancreatitis  - s/p laparoscopic cholecystectomy 8/21/2023  - diet being slowly advanced     Ileus due to pancreatitis  - resolving.  Several BM the past 24-48 hours     SIRS versus early Sepsis (resolved)  - CT abd/pelvis revealed hepatic steatosis, changes c/w acute pancreatitis with no evidence for pancreatic necrosis and no peripancreatic fluid collection.Cholelithiasis with nonspecific gallbladder wall thickening and no CT evidence of choledocholithiasis.  - abd U/S revealed cholelithiasis with gallbladder wall thickening and negative sonographic Hou sign  - surgery recommends to continue IV Zosyn until WBC normalizes     LIZ on CKD - resolved     HTN   -Home regimen lisinopril has been resumed     Hyperglycemia   - suspect due to stress response from pancreatitis/SIRS  - No prior hx of diabetes.  -A1c at 5.8     Incidental Findings - noted on CT imaging on admission:  - multiple bilateral indeterminate adrenal nodules favored to represent adenomas. Recommend nonemergent follow-up with adrenal CT.    - Liver segment 6 lesion most likely representing a cavernous hemangioma. It is  "slightly increased in size since 2010.     DVT Prophylaxis: Pneumatic Compression Devices  Baez Catheter: Not present  Lines: None     Cardiac Monitoring: None  Code Status: Full Code     DISPO:  when tolerating diet with adequate caloric intake.  1-2 days likely           Interval History (Subjective):      Continues to have some abd pain.  Eating small amounts.  Mobilzing in halls.  Does not feel much better than yesterday                  Physical Exam:      Last Vital Signs:  BP (!) 166/88 (BP Location: Left arm, Patient Position: Chair)   Pulse 82   Temp 98.7  F (37.1  C) (Oral)   Resp 22   Ht 1.6 m (5' 3\")   Wt 84 kg (185 lb 3 oz)   SpO2 95%   BMI 32.80 kg/m        Intake/Output Summary (Last 24 hours) at 8/25/2023 1050  Last data filed at 8/25/2023 1000  Gross per 24 hour   Intake 340 ml   Output 1500 ml   Net -1160 ml       Constitutional: Awake, alert, cooperative, no apparent distress     Respiratory: Clear to auscultation bilaterally, no crackles or wheezing   Cardiovascular: Regular rate and rhythm, normal S1 and S2, and no murmur noted   Abdomen: Normal bowel sounds, soft, non-distended, non-tender   Skin: No rashes, no cyanosis, dry to touch   Neuro: Alert and oriented x3, no weakness, numbness, memory loss   Extremities: No edema, normal range of motion   Other(s): Sitting in bedside chair       All other systems: Negative          Medications:      All current medications were reviewed with changes reflected in problem list.         Data:      All new lab and imaging data was reviewed.   Labs:  Recent Labs   Lab 08/25/23  0621   WBC 11.2*   HGB 10.1*   HCT 30.7*   MCV 94         Imaging:   No results found for this or any previous visit (from the past 24 hour(s)).   "

## 2023-08-25 NOTE — PROGRESS NOTES
"[facility]   General Surgery Progress Note           Assessment and Plan:   Assessment:   4 Days Post-Op  s/p Procedure(s):  CHOLECYSTECTOMY, LAPAROSCOPIC, WITH CHOLANGIOGRAM  Ileus as expected - resolving  LFTs trending down  Leukocytosis improving down to 11.4      Plan:   Leukocytosis - continue to monitor  Pain mgmt: IV dilaudid, Oxycodone, Tylenol, Atarax available prn, IV Toradol added  Bowel: senna-s  Diet: regular  IVFs: saline lock  Activity: encourage ambulation 3-4 x daily  Antibiotics: Zosyn  DVT prophylaxis: ambulation, PCD   Dispo: 1-2 days depending diet tolerance and clinical improvement      Addendum  Overall improving but still doesn't feel quite ready. Having upper abd pain into back that still sounds like resolving pancreatitis pain. Getting tylenol and oxycodone. New IV In place  Wbc normalized, afebrile, Stop zosyn  Could discharge from a surgery standpoint when pt feels ready  Joseline Aguirre MD          Interval History:   Resting in chair.  Feeling about the same.  Still feeling tired and weak.  Complains of RUQ pain that is extending into back, improves with medication.  Tolerating diet with no nausea.  Had several BM's yesterday.           Physical Exam:   Blood pressure 136/72, pulse 81, temperature 98.7  F (37.1  C), temperature source Oral, resp. rate 16, height 1.6 m (5' 3\"), weight 84 kg (185 lb 3 oz), SpO2 94 %, not currently breastfeeding.    I/O last 3 completed shifts:  In: 100 [P.O.:100]  Out: 1300 [Urine:1300]    Abdomen:   soft, non tender  Incisions - surgical glue in place - clean, dry, intact           Data:     Recent Labs   Lab 08/25/23  0621 08/24/23  0756 08/23/23  0723   WBC 11.2* 16.4* 14.0*   HGB 10.1* 10.7* 10.6*   HCT 30.7* 33.2* 32.9*   MCV 94 96 96    191 148*     Recent Labs   Lab 08/23/23  0723 08/22/23  0641 08/21/23  0607   AST 49* 91* 207*   * 201* 455*   ALKPHOS 120* 140* 195*   BILITOTAL 0.8 1.0 0.9       Jessica Renae " [facility]  Text page: [phone]  8-4 pm   After 4 pm call [phone]

## 2023-08-26 VITALS
OXYGEN SATURATION: 96 % | SYSTOLIC BLOOD PRESSURE: 165 MMHG | DIASTOLIC BLOOD PRESSURE: 64 MMHG | TEMPERATURE: 98.8 F | HEIGHT: 63 IN | RESPIRATION RATE: 18 BRPM | HEART RATE: 91 BPM | BODY MASS INDEX: 32.81 KG/M2 | WEIGHT: 185.19 LBS

## 2023-08-26 PROBLEM — E27.9 ADRENAL NODULE (H): Status: ACTIVE | Noted: 2023-08-26

## 2023-08-26 LAB
ALBUMIN SERPL BCG-MCNC: 2.6 G/DL (ref 3.5–5.2)
ALP SERPL-CCNC: 145 U/L (ref 35–104)
ALT SERPL W P-5'-P-CCNC: 46 U/L (ref 0–50)
ANION GAP SERPL CALCULATED.3IONS-SCNC: 9 MMOL/L (ref 7–15)
AST SERPL W P-5'-P-CCNC: 29 U/L (ref 0–45)
BILIRUB SERPL-MCNC: 0.3 MG/DL
BUN SERPL-MCNC: 12.2 MG/DL (ref 8–23)
CALCIUM SERPL-MCNC: 8.6 MG/DL (ref 8.8–10.2)
CHLORIDE SERPL-SCNC: 103 MMOL/L (ref 98–107)
CREAT SERPL-MCNC: 0.84 MG/DL (ref 0.51–0.95)
DEPRECATED HCO3 PLAS-SCNC: 26 MMOL/L (ref 22–29)
ERYTHROCYTE [DISTWIDTH] IN BLOOD BY AUTOMATED COUNT: 13.4 % (ref 10–15)
GFR SERPL CREATININE-BSD FRML MDRD: 76 ML/MIN/1.73M2
GLUCOSE BLDC GLUCOMTR-MCNC: 112 MG/DL (ref 70–99)
GLUCOSE BLDC GLUCOMTR-MCNC: 144 MG/DL (ref 70–99)
GLUCOSE SERPL-MCNC: 125 MG/DL (ref 70–99)
HCT VFR BLD AUTO: 29.6 % (ref 35–47)
HGB BLD-MCNC: 9.6 G/DL (ref 11.7–15.7)
MCH RBC QN AUTO: 30.8 PG (ref 26.5–33)
MCHC RBC AUTO-ENTMCNC: 32.4 G/DL (ref 31.5–36.5)
MCV RBC AUTO: 95 FL (ref 78–100)
PLATELET # BLD AUTO: 171 10E3/UL (ref 150–450)
POTASSIUM SERPL-SCNC: 3.4 MMOL/L (ref 3.4–5.3)
PROT SERPL-MCNC: 5.7 G/DL (ref 6.4–8.3)
RBC # BLD AUTO: 3.12 10E6/UL (ref 3.8–5.2)
SODIUM SERPL-SCNC: 138 MMOL/L (ref 136–145)
WBC # BLD AUTO: 10.3 10E3/UL (ref 4–11)

## 2023-08-26 PROCEDURE — 250N000013 HC RX MED GY IP 250 OP 250 PS 637: Performed by: INTERNAL MEDICINE

## 2023-08-26 PROCEDURE — 80053 COMPREHEN METABOLIC PANEL: CPT | Performed by: PHYSICIAN ASSISTANT

## 2023-08-26 PROCEDURE — 250N000013 HC RX MED GY IP 250 OP 250 PS 637: Performed by: PHYSICIAN ASSISTANT

## 2023-08-26 PROCEDURE — 85027 COMPLETE CBC AUTOMATED: CPT | Performed by: PHYSICIAN ASSISTANT

## 2023-08-26 PROCEDURE — 36415 COLL VENOUS BLD VENIPUNCTURE: CPT | Performed by: PHYSICIAN ASSISTANT

## 2023-08-26 PROCEDURE — 99238 HOSP IP/OBS DSCHRG MGMT 30/<: CPT | Performed by: INTERNAL MEDICINE

## 2023-08-26 RX ORDER — POLYETHYLENE GLYCOL 3350 17 G/17G
17 POWDER, FOR SOLUTION ORAL DAILY PRN
Qty: 30 PACKET | Refills: 0 | Status: SHIPPED | OUTPATIENT
Start: 2023-08-26 | End: 2023-09-14

## 2023-08-26 RX ORDER — OXYCODONE HYDROCHLORIDE 5 MG/1
5 TABLET ORAL
Qty: 12 TABLET | Refills: 0 | Status: SHIPPED | OUTPATIENT
Start: 2023-08-26 | End: 2023-09-14

## 2023-08-26 RX ADMIN — OXYCODONE HYDROCHLORIDE 5 MG: 5 TABLET ORAL at 08:06

## 2023-08-26 RX ADMIN — OXYCODONE HYDROCHLORIDE 5 MG: 5 TABLET ORAL at 12:04

## 2023-08-26 RX ADMIN — OXYCODONE HYDROCHLORIDE 5 MG: 5 TABLET ORAL at 03:40

## 2023-08-26 RX ADMIN — FAMOTIDINE 20 MG: 20 TABLET, FILM COATED ORAL at 08:06

## 2023-08-26 RX ADMIN — LISINOPRIL 10 MG: 10 TABLET ORAL at 08:06

## 2023-08-26 RX ADMIN — SENNOSIDES AND DOCUSATE SODIUM 1 TABLET: 50; 8.6 TABLET ORAL at 08:11

## 2023-08-26 RX ADMIN — MULTIPLE VITAMINS W/ MINERALS TAB 1 TABLET: TAB at 08:06

## 2023-08-26 ASSESSMENT — ACTIVITIES OF DAILY LIVING (ADL)
ADLS_ACUITY_SCORE: 22
ADLS_ACUITY_SCORE: 22
ADLS_ACUITY_SCORE: 24
ADLS_ACUITY_SCORE: 22
ADLS_ACUITY_SCORE: 24
ADLS_ACUITY_SCORE: 22
ADLS_ACUITY_SCORE: 22

## 2023-08-26 NOTE — PROGRESS NOTES
"[facility]   General Surgery Progress Note           Assessment and Plan:   Assessment:   5 Days Post-Op  s/p Procedure(s):  CHOLECYSTECTOMY, LAPAROSCOPIC, WITH CHOLANGIOGRAM  Ileus as expected - resolving        Plan:   Recheck labs  Pain mgmt: IV dilaudid, Oxycodone, Tylenol, Atarax available prn, IV Toradol added  Bowel: senna-s  Diet: regular  IVFs: saline lock  Activity: encourage ambulation 3-4 x daily  DVT prophylaxis: ambulation, PCD   Dispo: possible home later today vs tomorrow              Interval History:   Resting in chair.  Feeling a little better this morning but still having abdominal and back pain.  Feeling a little more bloated today after having prune juice last night.  Did have small BM this morning.  Walking in halls.          Physical Exam:   Blood pressure (!) 165/64, pulse 91, temperature 98.8  F (37.1  C), temperature source Oral, resp. rate 18, height 1.6 m (5' 3\"), weight 84 kg (185 lb 3 oz), SpO2 96 %, not currently breastfeeding.    I/O last 3 completed shifts:  In: 1760 [P.O.:1760]  Out: 1450 [Urine:1450]    Abdomen:   soft, non tender  Incisions - surgical glue in place - clean, dry, intact           Data:   Data   Recent Labs   Lab 08/26/23  0822 08/26/23  0125 08/25/23  2126 08/25/23  0922 08/25/23  0621 08/24/23  1114 08/24/23  0756 08/23/23  0805 08/23/23  0723 08/22/23  0835 08/22/23  0641   WBC  --   --   --   --  11.2*  --  16.4*  --  14.0*  --  16.9*   HGB  --   --   --   --  10.1*  --  10.7*  --  10.6*  --  11.7   MCV  --   --   --   --  94  --  96  --  96  --  97   PLT  --   --   --   --  163  --  191  --  148*  --  163   NA  --   --   --   --   --   --  137  --  138  --  140   POTASSIUM  --   --   --   --   --   --  3.5  --  3.9  --  4.0   CHLORIDE  --   --   --   --   --   --  103  --  105  --  105   CO2  --   --   --   --   --   --  26  --  25  --  24   BUN  --   --   --   --   --   --  10.0  --  10.5  --  13.6   CR  --   --   --   --   --   --  0.92  --  " 0.89  --  0.96*   ANIONGAP  --   --   --   --   --   --  8  --  8  --  11   AMBER  --   --   --   --   --   --  8.5*  --  8.2*  --  8.1*   * 144* 155*   < >  --    < > 108*   < > 119*   < > 147*   ALBUMIN  --   --   --   --   --   --   --   --  2.6*  --  2.7*   PROTTOTAL  --   --   --   --   --   --   --   --  5.6*  --  5.5*   BILITOTAL  --   --   --   --   --   --   --   --  0.8  --  1.0   ALKPHOS  --   --   --   --   --   --   --   --  120*  --  140*   ALT  --   --   --   --   --   --   --   --  112*  --  201*   AST  --   --   --   --   --   --   --   --  49*  --  91*    < > = values in this interval not displayed.            Jessica Renae PA-C

## 2023-08-26 NOTE — PLAN OF CARE
Goal Outcome Evaluation:      Plan of Care Reviewed With: patient, spouse    Overall Patient Progress: improvingOverall Patient Progress: improving         A&Ox4  VSS on RA  Complains of pain in abdomen that radiates to back, oxycodone given, see MAR  Activity: SBA walker  Consults: GI  Plan to discharge home today with       Patient discharged home via private car, transported via wheelchair with nursing staff.  Patient verbalized and received copy of discharge instructions.  Patient received medications filled by discharge Pharmacy.  Personal belongings gathered and sent with patient.  VSS. IV removed prior to discharge.

## 2023-08-26 NOTE — PROGRESS NOTES
[facility]  Hospitalist Progress Note  Jose Cutler MD 08/26/2023    Reason for Stay (Diagnosis): gallstone pancreatitis, s/p lap kimmy         Assessment and Plan:      Summary of Stay: Marivel Hernandez is a 68 year old female with a history of HTN, Migraine HA, CKD, Osteopenia who presents to the the hospital with symptomatology of abdominal pain, nausea and emesis.     Pt was found to have gallstone pancreatitis.  She underwent lap kimmy this admit.  Post op course complicated by ileus that is now slowly resolving.      Appetite has improved.  Pain is controlled with oral pain meds.  She is mobilizing in the halls     Plans today:  - d/w patient discharge plans.  I told her that if she is feeling well after lunch she can discharge this afternoon.  If not then likely tomorrow.          Acute Gallstone Pancreatitis  - s/p laparoscopic cholecystectomy 8/21/2023  - diet being slowly advanced     Ileus due to pancreatitis  - resolving.  Several BM the past 24-48 hours     SIRS versus early Sepsis (resolved)  - CT abd/pelvis revealed hepatic steatosis, changes c/w acute pancreatitis with no evidence for pancreatic necrosis and no peripancreatic fluid collection.Cholelithiasis with nonspecific gallbladder wall thickening and no CT evidence of choledocholithiasis.  - abd U/S revealed cholelithiasis with gallbladder wall thickening and negative sonographic Hou sign  - was treated with IV Zosyn 8/20-8/25; now off all antibiotics     LIZ on CKD - resolved     HTN   -Home regimen lisinopril has been resumed     Hyperglycemia   - suspect due to stress response from pancreatitis/SIRS  - No prior hx of diabetes.  -A1c at 5.8     Incidental Findings - noted on CT imaging on admission:  - multiple bilateral indeterminate adrenal nodules favored to represent adenomas. Recommend nonemergent follow-up with adrenal CT.    - Liver segment 6 lesion most likely representing a cavernous hemangioma. It is  "slightly increased in size since 2010.     DVT Prophylaxis: Pneumatic Compression Devices  Baez Catheter: Not present  Lines: None     Cardiac Monitoring: None  Code Status: Full Code     DISPO:  when tolerating diet with adequate caloric intake.  1-2 days likely           Interval History (Subjective):      Continues to feel a bit better each day albeit progress is slow.  Pain controlled with PO pain meds, appetite improved, mobilizing in halls.  D/w her possible discharge today                  Physical Exam:      Last Vital Signs:  BP (!) 165/64 (BP Location: Left arm)   Pulse 91   Temp 98.8  F (37.1  C) (Oral)   Resp 18   Ht 1.6 m (5' 3\")   Wt 84 kg (185 lb 3 oz)   SpO2 96%   BMI 32.80 kg/m        Intake/Output Summary (Last 24 hours) at 8/26/2023 1004  Last data filed at 8/26/2023 0314  Gross per 24 hour   Intake 1520 ml   Output 1150 ml   Net 370 ml       Constitutional: Awake, alert, cooperative, no apparent distress     Respiratory: Clear to auscultation bilaterally, no crackles or wheezing   Cardiovascular: Regular rate and rhythm, normal S1 and S2, and no murmur noted   Abdomen: Normal bowel sounds, soft, non-distended, non-tender   Skin: No rashes, no cyanosis, dry to touch   Neuro: Alert and oriented x3, no weakness, numbness, memory loss   Extremities: No edema, normal range of motion   Other(s): Sitting in bedside chair       All other systems: Negative          Medications:      All current medications were reviewed with changes reflected in problem list.         Data:      All new lab and imaging data was reviewed.   Labs:  Recent Labs   Lab 08/26/23  0905   WBC 10.3   HGB 9.6*   HCT 29.6*   MCV 95         Imaging:   No results found for this or any previous visit (from the past 24 hour(s)).   "

## 2023-08-26 NOTE — DISCHARGE SUMMARY
"[facility]  Hospitalist Discharge Summary      Date of Admission:  8/20/2023  Date of Discharge:  8/26/2023  Discharging Provider: Jose Cutler MD  Discharge Service: Hospitalist Service    Discharge Diagnoses   - Gallstone pancreatitis  - SIRS due to pancreatitis  - Laparoscopy cholecystectomy  - ARF on CKD  - Incidental finding of multiple bilateral indeterminate adrenal nodules favored to represent adenomas. Recommend nonemergent follow-up with adrenal CT.   - suspected hepatic hemangioma on CT imaging, noted on CT scan dating back to 2010  - Moderate Protein-Calorie Malnutrition.  Appreciate nutrition service input this admission      PMH:     Migraine HA  CKD  Osteopenia  HTN    Clinically Significant Risk Factors     # Obesity: Estimated body mass index is 32.8 kg/m  as calculated from the following:    Height as of this encounter: 1.6 m (5' 3\").    Weight as of this encounter: 84 kg (185 lb 3 oz).       Follow-ups Needed After Discharge   Follow-up Appointments     Follow-up and recommended labs and tests       See primary provider within 5-10 days after your hospitalization for   routine follow up        Recommend adrenal-focused CT scan to follow up on adrenal nodules incidentally noted on CT imaging this admission.  Discuss this with your primary provider at your next clinic visit who can arrange this for you.  Recommend you bring the copy of the CT scan report you were given to discuss with your provider.      Unresulted Labs Ordered in the Past 30 Days of this Admission       No orders found from 7/21/2023 to 8/21/2023.            Discharge Disposition   Discharged to home  Condition at discharge: Stable    Hospital Course   68 year old female with a history of HTN, Migraine HA, CKD, Osteopenia who presents to the the hospital with symptomatology of abdominal pain, nausea and emesis.     Pt was found to have gallstone pancreatitis.  She underwent lap kimmy this admit.  "     Post op course complicated by ileus related to pancreatitis and surgery that was slow to resolve.      Appetite has now improved and oral intake is adequate.  Pain is controlled with oral pain meds.  She is mobilizing in the halls.  She appears stable for discharge home    Consultations This Hospital Stay   GASTROENTEROLOGY IP CONSULT  SURGERY GENERAL IP CONSULT    Code Status   Full Code    Time Spent on this Encounter   I, Jose Cutler MD, personally saw the patient today and spent less than or equal to 30 minutes discharging this patient.       Jose Cutler MD  [facility] MEDICAL SURGICAL  [address] [facility] [address]  Phone: [phone]  Fax: [phone]  ______________________________________________________________________    Physical Exam   Vital Signs: Temp: 98.8  F (37.1  C) Temp src: Oral BP: (!) 165/64 Pulse: 91   Resp: 18 SpO2: 96 % O2 Device: None (Room air)    Weight: 185 lbs 2.98 oz  Awake, alert, interactive  RRR  CTA bilaterally  NT/ND, soft, BS present    Primary Care Physician   Daniel Lao    Discharge Orders      Reason for your hospital stay    Gallstone pancreatitis, gallbladder removal     Follow-up and recommended labs and tests     See primary provider within 5-10 days after your hospitalization for routine follow up     Activity    Your activity upon discharge: activity as tolerated     Diet    Follow this diet upon discharge: regular diet.  Avoid drinking any alcohol as this can worsen pancreatitis       Significant Results and Procedures   Results for orders placed or performed during the hospital encounter of 08/20/23   Abdomen US, limited (RUQ only)    Narrative    EXAM: US ABDOMEN LIMITED  LOCATION: [facility]  DATE: 8/20/2023    INDICATION: epigastric abd pain  COMPARISON: None.  TECHNIQUE: Limited abdominal ultrasound.    FINDINGS:    GALLBLADDER: Gallstones. Mild gallbladder wall thickening.  No pericholecystic fluid. Negative sonographic Hou sign.    BILE DUCTS: No biliary dilatation. The common duct measures 2.7 mm.    LIVER: Diffusely echogenic. No focal mass.    RIGHT KIDNEY: No hydronephrosis.    PANCREAS: The visualized portions are normal.    No ascites.      Impression    IMPRESSION:  1.  Fatty liver.  2.  Cholelithiasis with gallbladder wall thickening and negative sonographic Hou sign.       CT Abdomen Pelvis w Contrast    Narrative    EXAM: CT ABDOMEN PELVIS W CONTRAST  LOCATION: [facility]  DATE: 8/20/2023    INDICATION: abdominal pain, diffuse, worse epigastrically, cholelithiasis on ultrasound, lactic >4.  COMPARISON: Ultrasound 08/20/2023 and CT 11/26/2010  TECHNIQUE: CT scan of the abdomen and pelvis was performed following injection of IV contrast. Multiplanar reformats were obtained. Dose reduction techniques were used.  CONTRAST: 92mL Isovue 370    FINDINGS:   LOWER CHEST: Fluid-filled lower thoracic esophagus and small hiatal hernia. Mild bibasilar atelectasis/scarring.    HEPATOBILIARY: Hepatic steatosis. Within liver segment 6 there is a 2.3 x 2.7 cm lesion, previously measuring 2.1 x 2.5 cm. It demonstrates discontinuous peripheral nodular enhancement. Cholelithiasis with mild nonspecific wall thickening. No biliary   ductal dilatation. No radiodense intraductal gallstone.    PANCREAS: Moderate diffuse peripancreatic edema with free fluid. No loculated fluid collection. No evidence for pancreatic necrosis.    SPLEEN: Normal.    ADRENAL GLANDS: Bilateral indeterminate adrenal nodules including a dominant 1.9 cm right adrenal nodule and 0.9 cm right adrenal nodule, new since the previous exam. 1.5 cm left adrenal nodule, previously measuring 0.9 cm.    KIDNEYS/BLADDER: Bilateral renal cysts including multiple left peripelvic renal cysts. No follow-up is indicated. No hydronephrosis or hydroureter. Normal bladder.    BOWEL: Normal caliber small bowel  without inflammatory changes. Normal appendix. Unremarkable colon. No free air.    LYMPH NODES: Normal.    VASCULATURE: Minimal aortic atherosclerosis. Patent central SMA and SMV. Patent splenic vein.    PELVIC ORGANS: Hysterectomy. Unremarkable ovaries. No pelvic free fluid.    MUSCULOSKELETAL: Normal.      Impression    IMPRESSION:   1.  Moderate peripancreatic edema and free fluid consistent with acute interstitial edematous pancreatitis. No evidence for pancreatic necrosis and no peripancreatic fluid collection.  2.  Hepatic steatosis.  3.  Cholelithiasis with nonspecific gallbladder wall thickening. No CT evidence of choledocholithiasis.  4.  Multiple bilateral indeterminate adrenal nodules favored to represent adenomas. Recommend nonemergent follow-up with adrenal CT.  5.  Liver segment 6 lesion most likely representing a cavernous hemangioma. It is slightly increased in size since 2010.   XR Surgery MICAH L/T 5 Min Fluoro w Stills    Narrative    CHOLANGIOGRAM SURGERY  8/21/2023 2:37 PM    HISTORY: lap kimmy    COMPARISON: CT 8/20/2023.    FLUOROSCOPY TIME: 0.1 minutes.    FINDINGS:    Fluoroscopic spot images of a cystic duct injection  demonstrate opacification of the common bile duct. No filling defects  are identified within the common bile duct.      Impression    IMPRESSION:    No evidence for choledocholithiasis within the common  bile duct.    KAYLA AGUILAR MD         SYSTEM ID:  [id number]       Discharge Medications   Current Discharge Medication List        START taking these medications    Details   oxyCODONE (ROXICODONE) 5 MG tablet Take 1 tablet (5 mg) by mouth every 3 hours as needed for severe pain (IF pain not managed with non-pharmacological and non-opioid interventions)  Qty: 12 tablet, Refills: 0    Associated Diagnoses: Gall stone pancreatitis      polyethylene glycol (MIRALAX) 17 g packet Take 17 g by mouth daily as needed for constipation  Qty: 30 packet, Refills: 0    Associated  Diagnoses: Gall stone pancreatitis           CONTINUE these medications which have NOT CHANGED    Details   famotidine (PEPCID) 20 MG tablet Take 1 tablet (20 mg) by mouth nightly as needed (for epigastric pain)  Qty: 90 tablet, Refills: 3    Associated Diagnoses: Epigastric pain      lisinopril (ZESTRIL) 10 MG tablet Take 1 tablet (10 mg) by mouth daily  Qty: 90 tablet, Refills: 3    Associated Diagnoses: Benign essential hypertension      multivitamin w/minerals (THERA-VIT-M) tablet Take 1 tablet by mouth daily           Allergies   Allergies   Allergen Reactions    No Known Allergies

## 2023-08-26 NOTE — PLAN OF CARE
Pt is alert and oriented x4. Pt rated abdominal pain 8/10 and was given PRN Oxycodone during the shift. Pt is on LR @ 50ml/hr. Pt is on blood sugar checks. Pt is on  Zosyn antibiotics. Pt is assist of one in transfer and cares. Pt is sleeping in bed. Bed alarm in place and call light within reach. Will continue to monitor and assess.

## 2023-08-28 ENCOUNTER — TELEPHONE (OUTPATIENT)
Dept: PEDIATRICS | Facility: CLINIC | Age: 68
End: 2023-08-28
Payer: COMMERCIAL

## 2023-08-28 ENCOUNTER — NURSE TRIAGE (OUTPATIENT)
Dept: NURSING | Facility: CLINIC | Age: 68
End: 2023-08-28
Payer: COMMERCIAL

## 2023-08-28 ENCOUNTER — TELEPHONE (OUTPATIENT)
Dept: SURGERY | Facility: CLINIC | Age: 68
End: 2023-08-28
Payer: COMMERCIAL

## 2023-08-28 NOTE — TELEPHONE ENCOUNTER
S/p   Laparoscopic Cholecystectomy, Intraoperative cholangiogram, and use of indocyanine green dye  Procedure date: 8/21/23  Surgeon: Dr. Duckworth    Patient is calling to report abdominal bloating. She tells me that abdomen is bloated and round but soft.  She has had some loose, watery stool but not much.  She is not passing gas.    She denies nausea/vomiting, eating a regular diet.     Pt reports that she has not been taking any of the Miralax because of the small amounts of watery stool she has had and didn't want to make that worse.  She is taking oxycodone 1-2 times a day in addition to ES Tylenol.        Denies any worsening abdominal pain.  Incisions are without redness, swelling or drainage.  Abdomen feels uncomfortable when she is standing.      Patient also reports swelling in bilateral lower extremities up to her knees.    She denies shortness of breath,  no chest pain.    We discussed that she should take OTC laxative - recommended she try Miralax, but patient doesn't feel she can drink that much.  Instead she will try Milk of magnesia per package directions.  Can try a dulcolax suppository if no results.      She should call PCP office to discuss fluid retention in BLE's and make a follow up appointment.   (She will call  [facility])      If patient has no BM with the above interventions and is becoming nauseated or vomiting she is to go to ED.  She will call our office if she does not have improvement with symptoms after having a BM.     Patient verbalizes understanding of our conversation and is in agreement with plan.

## 2023-08-28 NOTE — TELEPHONE ENCOUNTER
calling for patient. Patient has surgery a week ago to have her gallbladder out.  is concerned as she is not getting around well and when she tries to drink anything it causes her pain and she feels full. Asked to speak with patient to triage.  Patient reports that she is still so bloated from all the IV fluids. Surgery was Monday and she was discharged Saturday and was on IV fluids that whole time. Patient states that when she drinks water it feels like it is just rising and overflowing and is just very bloated. Patient asking how to get rid of the bloating. Patient was up walking to the bathroom and up and down the hallway. Patient will take her pain meds with water but then drinking fluids triggers the pain.   Patient has not had a solid bowel movement since surgery but has had some diarrhea. No urge to pass a stool and no rectal fullness. Patient does not feel constipated.   Patient has swelling up to her knee on both legs and her knee feels tight when she tries to bend it.   Patient has been sleeping in a recliner as laying flat gets painful and she is unable to do it for a long period of time.   Pain is deeper in the middle by the incisions that go into her back. Pressing on the incision does not cause pain.   Patient using oxycodone twice a day and then extra strength tylenol.   Patient does not currently have a PCP as PCP has left the practice.   Patient does not have any follow up appointments scheduled.   Protocol recommends second level triage  Informed patient that speaking with the on call for her surgeon to get their input on next steps is needed. Connected patient to [employer]  to page on call to call patient back directly.   Patient then will call back to schedule follow up with primary care.   If symptoms worsen patient will call back the nurse advisors.  Bárbara Brandt RN   08/28/23 11:24 AM  [facility] Nurse Advisor        Reason for Disposition   Patient  sounds very sick or weak to the triager    Additional Information   Negative: Sounds like a life-threatening emergency to the triager   Negative: Chest pain   Negative: Difficulty breathing   Negative: Acting confused (e.g., disoriented, slurred speech) or excessively sleepy   Negative: Surgical incision symptoms and questions   Negative: Pain or burning with passing urine (urination) and male   Negative: Pain or burning with passing urine (urination) and female   Negative: Constipation   Negative: New or worsening leg (calf, thigh) pain   Negative: New or worsening leg swelling   Negative: Dizziness is severe, or persists > 24 hours after surgery   Negative: Symptoms arising from use of a urinary catheter (Baez or Coude)   Negative: Major abdominal surgical incision and wound gaping open with visible internal organs   Negative: Sounds like a life-threatening emergency to the triager   Negative: Bleeding from incision and won't stop after 10 minutes of direct pressure   Negative: Bleeding (more than a few drops) from incision and after blood vessel surgery (e.g., carotidendarterectomy, femoral bypass graft, kidney dialysis fistula, tracheostomy)   Negative: Bright red, wide-spread, sunburn-like rash   Negative: SEVERE pain in the incision   Negative: Incision gaping open and < 2 days (48 hours) since wound re-opened   Negative: Incision gaping open and length of opening > 2 inches (5 cm)   Negative: Patient sounds very sick or weak to the triager   Negative: Sounds like a serious complication to the triager   Negative: Fever > 100.4 F (38.0 C)   Negative: Incision looks infected (spreading redness, pain)   Negative: Red streak runs from the incision and longer than 1 inch (2.5 cm)   Negative: Pus or bad-smelling fluid draining from incision   Negative: Dressing soaked with blood or body fluid (e.g., drainage)   Negative: Raised bruise and size > 2 inches (5 cm) and expanding   Negative: Scant bleeding (e.g., few  drops) from incision and after blood vessel surgery (e.g., carotid endarterectomy, femoral bypass graft, kidney dialysis fistula   Negative: Caller has URGENT question and triager unable to answer question   Negative: Incision gaping open and length of opening > 1/4 inch (6 mm) and on the face and over 2 days since wound re-opened   Negative: Incision gaping open and length of opening > 1/2 inch (1 cm) and over 2 days since wound re-opened   Negative: Clear or blood-tinged fluid draining from wound and no fever   Negative: Suture or staple removal is overdue   Negative: Patient wants to be seen   Negative: INCREASING pain in incision and over 2 days (48 hours) since surgery   Negative: Suture or staple came out early and caller wants wound checked   Negative: Caller has NON-URGENT question and triager unable to answer question   Negative: Sounds like a life-threatening emergency to the triager   Negative: Chest pain   Negative: Small area of swelling and followed an insect bite to the area   Negative: Followed a knee injury   Negative: Ankle or foot injury   Negative: Pregnant with leg swelling or edema   Negative: Difficulty breathing at rest   Negative: Entire foot is cool or blue in comparison to other side   Negative: SEVERE swelling (e.g., swelling extends above knee, entire leg is swollen, weeping fluid)   Negative: Thigh or calf pain and only 1 side and present > 1 hour   Negative: Thigh, calf, or ankle swelling in only one leg   Negative: Thigh, calf, or ankle swelling in both legs, but one side is definitely more swollen (Exception: longstanding difference between legs)   Negative: Cast on leg or ankle and has increasing pain   Negative: Can't walk or can barely stand (new-onset)   Negative: Fever and red area (or area very tender to touch)   Negative: Patient sounds very sick or weak to the triager   Negative: Cast problems or questions   Negative: Medication question   Negative: Bright red, wide-spread,  sunburn-like rash   Negative: SEVERE headache and after spinal (epidural) anesthesia   Negative: Vomiting and persists > 4 hours   Negative: Vomiting and abdomen looks much more swollen than usual   Negative: Drinking very little and dehydration suspected (e.g., no urine > 12 hours, very dry mouth, very lightheaded)    Protocols used: Post-Op Incision Symptoms and [id number], Leg Swelling and Edema-A-OH, Post-Op Symptoms and [id number]-T-VM

## 2023-08-28 NOTE — TELEPHONE ENCOUNTER
Name of caller: Patient    Reason for Call:  Concerned for bloating form neck to toes also tightness in legs.    Surgeon:  Dr. Duckworth    Recent Surgery:  Yes.    If yes, when & what type:  8/21, Laparoscopic Cholecystectomy, Intraoperative cholangiogram, and use of indocyanine green dye       Best phone number to reach pt at is: [phone]  Ok to leave a message with medical info? Yes.    Pharmacy preferred (if calling for a refill): NA

## 2023-08-28 NOTE — TELEPHONE ENCOUNTER
Reason for Call:  Appointment Request    Patient requesting this type of appt:  Hospital/ED Follow-Up     Requested provider:  Keira Hernandez    Reason patient unable to be scheduled: Not within requested timeframe    When does patient want to be seen/preferred time:  5-10 days from Surgery (8/26/23)    Comments: Patient called to schedule post-op with a primary care provider at [facility], there were no soon enough appts listed to fall within the 5-10 follow up from hospital. Patient was seen at the ER and put in surgery for Gallbladder on 8/26/23 at [facility]. Please call to schedule Post Op appt. Thank you.    Could we send this information to you in [employer] or would you prefer to receive a phone call?:   Patient would prefer a phone call   Okay to leave a detailed message?: Yes at Cell number on file:    Telephone Information:   Mobile [phone]       Call taken on 8/28/2023 at 2:38 PM by Estee Cook   Soft, nontender

## 2023-09-05 ENCOUNTER — OFFICE VISIT (OUTPATIENT)
Dept: PEDIATRICS | Facility: CLINIC | Age: 68
End: 2023-09-05
Payer: COMMERCIAL

## 2023-09-05 VITALS
TEMPERATURE: 98.6 F | DIASTOLIC BLOOD PRESSURE: 82 MMHG | OXYGEN SATURATION: 98 % | HEART RATE: 77 BPM | WEIGHT: 176 LBS | SYSTOLIC BLOOD PRESSURE: 126 MMHG | RESPIRATION RATE: 16 BRPM | BODY MASS INDEX: 31.18 KG/M2

## 2023-09-05 DIAGNOSIS — E27.9 ADRENAL NODULE (H): ICD-10-CM

## 2023-09-05 DIAGNOSIS — R74.8 ELEVATED LIVER ENZYMES: ICD-10-CM

## 2023-09-05 DIAGNOSIS — D64.9 LOW HEMOGLOBIN: ICD-10-CM

## 2023-09-05 DIAGNOSIS — K85.10 GALL STONE PANCREATITIS: Primary | ICD-10-CM

## 2023-09-05 LAB
ALBUMIN SERPL BCG-MCNC: 4 G/DL (ref 3.5–5.2)
ALP SERPL-CCNC: 127 U/L (ref 35–104)
ALT SERPL W P-5'-P-CCNC: 16 U/L (ref 0–50)
ANION GAP SERPL CALCULATED.3IONS-SCNC: 12 MMOL/L (ref 7–15)
AST SERPL W P-5'-P-CCNC: 24 U/L (ref 0–45)
BASOPHILS # BLD AUTO: 0 10E3/UL (ref 0–0.2)
BASOPHILS NFR BLD AUTO: 0 %
BILIRUB SERPL-MCNC: 0.2 MG/DL
BUN SERPL-MCNC: 22.9 MG/DL (ref 8–23)
CALCIUM SERPL-MCNC: 10.3 MG/DL (ref 8.8–10.2)
CHLORIDE SERPL-SCNC: 105 MMOL/L (ref 98–107)
CREAT SERPL-MCNC: 0.97 MG/DL (ref 0.51–0.95)
DEPRECATED HCO3 PLAS-SCNC: 25 MMOL/L (ref 22–29)
EOSINOPHIL # BLD AUTO: 0.2 10E3/UL (ref 0–0.7)
EOSINOPHIL NFR BLD AUTO: 3 %
ERYTHROCYTE [DISTWIDTH] IN BLOOD BY AUTOMATED COUNT: 13.1 % (ref 10–15)
GFR SERPL CREATININE-BSD FRML MDRD: 64 ML/MIN/1.73M2
GLUCOSE SERPL-MCNC: 88 MG/DL (ref 70–99)
HCT VFR BLD AUTO: 34.8 % (ref 35–47)
HGB BLD-MCNC: 11.1 G/DL (ref 11.7–15.7)
IMM GRANULOCYTES # BLD: 0.1 10E3/UL
IMM GRANULOCYTES NFR BLD: 1 %
LYMPHOCYTES # BLD AUTO: 2 10E3/UL (ref 0.8–5.3)
LYMPHOCYTES NFR BLD AUTO: 26 %
MCH RBC QN AUTO: 30.7 PG (ref 26.5–33)
MCHC RBC AUTO-ENTMCNC: 31.9 G/DL (ref 31.5–36.5)
MCV RBC AUTO: 96 FL (ref 78–100)
MONOCYTES # BLD AUTO: 0.5 10E3/UL (ref 0–1.3)
MONOCYTES NFR BLD AUTO: 7 %
NEUTROPHILS # BLD AUTO: 4.7 10E3/UL (ref 1.6–8.3)
NEUTROPHILS NFR BLD AUTO: 62 %
PLATELET # BLD AUTO: 405 10E3/UL (ref 150–450)
POTASSIUM SERPL-SCNC: 4.6 MMOL/L (ref 3.4–5.3)
PROT SERPL-MCNC: 7.3 G/DL (ref 6.4–8.3)
RBC # BLD AUTO: 3.61 10E6/UL (ref 3.8–5.2)
SODIUM SERPL-SCNC: 142 MMOL/L (ref 136–145)
WBC # BLD AUTO: 7.5 10E3/UL (ref 4–11)

## 2023-09-05 PROCEDURE — 80053 COMPREHEN METABOLIC PANEL: CPT | Performed by: PHYSICIAN ASSISTANT

## 2023-09-05 PROCEDURE — 85025 COMPLETE CBC W/AUTO DIFF WBC: CPT | Performed by: PHYSICIAN ASSISTANT

## 2023-09-05 PROCEDURE — 99214 OFFICE O/P EST MOD 30 MIN: CPT | Performed by: PHYSICIAN ASSISTANT

## 2023-09-05 PROCEDURE — 36415 COLL VENOUS BLD VENIPUNCTURE: CPT | Performed by: PHYSICIAN ASSISTANT

## 2023-09-05 RX ORDER — ACETAMINOPHEN 325 MG/1
325-650 TABLET ORAL EVERY 6 HOURS PRN
COMMUNITY

## 2023-09-05 ASSESSMENT — PAIN SCALES - GENERAL: PAINLEVEL: NO PAIN (0)

## 2023-09-05 NOTE — PROGRESS NOTES
"  Assessment & Plan     Gall stone pancreatitis    - CBC with platelets and differential; Future  - Comprehensive metabolic panel (BMP + Alb, Alk Phos, ALT, AST, Total. Bili, TP); Future  - CBC with platelets and differential  - Comprehensive metabolic panel (BMP + Alb, Alk Phos, ALT, AST, Total. Bili, TP)    Adrenal nodule on CT 8/2023 - nonemergent adrenal CT (H)    - CT Abdomen w/o & w Contrast; Future    Low hemoglobin    - CBC with platelets and differential; Future  - CBC with platelets and differential    Elevated liver enzymes    - Comprehensive metabolic panel (BMP + Alb, Alk Phos, ALT, AST, Total. Bili, TP); Future  - Comprehensive metabolic panel (BMP + Alb, Alk Phos, ALT, AST, Total. Bili, TP)           MED REC REQUIRED  Post Medication Reconciliation Status:   BMI:   Estimated body mass index is 31.18 kg/m  as calculated from the following:    Height as of 8/21/23: 1.6 m (5' 3\").    Weight as of this encounter: 79.8 kg (176 lb).           Alis Quintanilla PA-C  [facility] CONOR Orta is a 68 year old, presenting for the following health issues:  Hospital F/U      9/5/2023     2:51 PM   Additional Questions   Roomed by Gretchen         9/5/2023     2:51 PM   Patient Reported Additional Medications   Patient reports taking the following new medications None       HPI       Hospital Follow-up Visit:    Hospital/Nursing Home/IP Rehab Facility: [facility]  Date of Admission: 8/20/2023  Date of Discharge: 8/26/2023  Reason(s) for Admission: Gall stone pancreatitis    Was your hospitalization related to COVID-19? No   Problems taking medications regularly:  None  Medication changes since discharge: None  Problems adhering to non-medication therapy:  None    Summary of hospitalization:  [facility] discharge summary reviewed  Diagnostic Tests/Treatments reviewed.  Follow up needed: Followup imaging.    Other Healthcare Providers Involved in " Patient s Care:         None  Update since discharge: improved.         Plan of care communicated with patient           Patient is here for a hospital followup.  She was seen in the ED and admitted for gallstone pancreatitis.  She states that she is doing much better today and does not have any concerns or symptoms.        Review of Systems   Constitutional, HEENT, cardiovascular, pulmonary, gi and gu systems are negative, except as otherwise noted.      Objective    There were no vitals taken for this visit.  There is no height or weight on file to calculate BMI.  Physical Exam   GENERAL: healthy, alert and no distress  NECK: no adenopathy, no asymmetry, masses, or scars and thyroid normal to palpation  RESP: lungs clear to auscultation - no rales, rhonchi or wheezes  CV: regular rate and rhythm, normal S1 S2, no S3 or S4, no murmur, click or rub, no peripheral edema and peripheral pulses strong  ABDOMEN: soft, nontender, no hepatosplenomegaly, no masses and bowel sounds normal  MS: no gross musculoskeletal defects noted, no edema  SKIN: Abdominal incisions appear to be healing well without any drainage, edema or erythema noted.      Labs - pending    Alis Quintanilla PA-C

## 2023-09-06 DIAGNOSIS — I10 BENIGN ESSENTIAL HYPERTENSION: ICD-10-CM

## 2023-09-08 RX ORDER — LISINOPRIL 10 MG/1
10 TABLET ORAL DAILY
Qty: 90 TABLET | Refills: 0 | Status: SHIPPED | OUTPATIENT
Start: 2023-09-08 | End: 2023-09-14

## 2023-09-08 NOTE — TELEPHONE ENCOUNTER
Routing refill request to provider for review/approval because:  Labs out of range:  creatinine  Patient needs to be seen because it has been more than 1 year since last office visit.    Flor Harmon RN

## 2023-09-11 ENCOUNTER — TELEPHONE (OUTPATIENT)
Dept: SURGERY | Facility: CLINIC | Age: 68
End: 2023-09-11
Payer: COMMERCIAL

## 2023-09-11 DIAGNOSIS — E87.1 LOW SODIUM LEVELS: Primary | ICD-10-CM

## 2023-09-11 NOTE — RESULT ENCOUNTER NOTE
Pancho Orta ,    Overall the labs appear to be improving.  Please be sure to followup if symptoms do not continue to improve.  Please be seen sooner with any changes or concerns.        Thank you for choosing [facility] for your health care needs,      Alis Quintanilla PA-C

## 2023-09-11 NOTE — CONFIDENTIAL NOTE
Routed to Dr Kelley  Please advise   [facility]  Post op call note     Marivel Hernandez was called for an update regarding her recovery.  She underwent laparoscopic cholecystectomy by Dr. Duckworth on 8/21/2023. Today she tells me she is doing well and denies any complaints. She is eating a normal diet and her bowels are regular. She does report feeling full quickly with meals. She states her wounds are healing well.    The pathology revealed chronic cholecystitis and cholelithiasis.  This was discussed with the patient.     She was instructed to slowly and gradually resume all normal activities. She states all of her questions were answered.  She understands our discussion.  She agrees to follow up as needed or to call our office with any concerns.    Chelsea Gerardo PA-C

## 2023-09-13 SDOH — ECONOMIC STABILITY: FOOD INSECURITY: WITHIN THE PAST 12 MONTHS, YOU WORRIED THAT YOUR FOOD WOULD RUN OUT BEFORE YOU GOT MONEY TO BUY MORE.: NEVER TRUE

## 2023-09-13 SDOH — ECONOMIC STABILITY: INCOME INSECURITY: HOW HARD IS IT FOR YOU TO PAY FOR THE VERY BASICS LIKE FOOD, HOUSING, MEDICAL CARE, AND HEATING?: NOT HARD AT ALL

## 2023-09-13 SDOH — ECONOMIC STABILITY: INCOME INSECURITY: IN THE LAST 12 MONTHS, WAS THERE A TIME WHEN YOU WERE NOT ABLE TO PAY THE MORTGAGE OR RENT ON TIME?: NO

## 2023-09-13 SDOH — ECONOMIC STABILITY: FOOD INSECURITY: WITHIN THE PAST 12 MONTHS, THE FOOD YOU BOUGHT JUST DIDN'T LAST AND YOU DIDN'T HAVE MONEY TO GET MORE.: NEVER TRUE

## 2023-09-13 SDOH — HEALTH STABILITY: PHYSICAL HEALTH: ON AVERAGE, HOW MANY MINUTES DO YOU ENGAGE IN EXERCISE AT THIS LEVEL?: 20 MIN

## 2023-09-13 ASSESSMENT — ENCOUNTER SYMPTOMS
HEMATURIA: 0
DIARRHEA: 0
HEADACHES: 0
DYSURIA: 0
ARTHRALGIAS: 0
ABDOMINAL PAIN: 0
COUGH: 0
PALPITATIONS: 1
DIZZINESS: 0
SORE THROAT: 0
EYE PAIN: 0
CHILLS: 0
HEMATOCHEZIA: 0
SHORTNESS OF BREATH: 0
FREQUENCY: 0
FEVER: 0
CONSTIPATION: 0
JOINT SWELLING: 0
HEARTBURN: 0
MYALGIAS: 0
NERVOUS/ANXIOUS: 0
BREAST MASS: 0
NAUSEA: 0
PARESTHESIAS: 0
WEAKNESS: 0

## 2023-09-13 ASSESSMENT — LIFESTYLE VARIABLES
HOW OFTEN DO YOU HAVE A DRINK CONTAINING ALCOHOL: 2-3 TIMES A WEEK
SKIP TO QUESTIONS 9-10: 1
HOW OFTEN DO YOU HAVE SIX OR MORE DRINKS ON ONE OCCASION: NEVER
HOW MANY STANDARD DRINKS CONTAINING ALCOHOL DO YOU HAVE ON A TYPICAL DAY: 1 OR 2
AUDIT-C TOTAL SCORE: 3

## 2023-09-13 ASSESSMENT — SOCIAL DETERMINANTS OF HEALTH (SDOH)
IN A TYPICAL WEEK, HOW MANY TIMES DO YOU TALK ON THE PHONE WITH FAMILY, FRIENDS, OR NEIGHBORS?: MORE THAN THREE TIMES A WEEK
DO YOU BELONG TO ANY CLUBS OR ORGANIZATIONS SUCH AS CHURCH GROUPS UNIONS, FRATERNAL OR ATHLETIC GROUPS, OR SCHOOL GROUPS?: NO
HOW OFTEN DO YOU GET TOGETHER WITH FRIENDS OR RELATIVES?: TWICE A WEEK
HOW OFTEN DO YOU ATTEND CHURCH OR RELIGIOUS SERVICES?: 1 TO 4 TIMES PER YEAR

## 2023-09-13 ASSESSMENT — ACTIVITIES OF DAILY LIVING (ADL): CURRENT_FUNCTION: NO ASSISTANCE NEEDED

## 2023-09-14 ENCOUNTER — OFFICE VISIT (OUTPATIENT)
Dept: PEDIATRICS | Facility: CLINIC | Age: 68
End: 2023-09-14
Payer: COMMERCIAL

## 2023-09-14 VITALS
DIASTOLIC BLOOD PRESSURE: 80 MMHG | HEIGHT: 64 IN | BODY MASS INDEX: 29.71 KG/M2 | RESPIRATION RATE: 16 BRPM | SYSTOLIC BLOOD PRESSURE: 110 MMHG | WEIGHT: 174 LBS | OXYGEN SATURATION: 99 % | HEART RATE: 82 BPM | TEMPERATURE: 98.2 F

## 2023-09-14 DIAGNOSIS — K85.10 GALL STONE PANCREATITIS: ICD-10-CM

## 2023-09-14 DIAGNOSIS — Z00.00 WELCOME TO MEDICARE PREVENTIVE VISIT: Primary | ICD-10-CM

## 2023-09-14 DIAGNOSIS — Z13.21 ENCOUNTER FOR VITAMIN DEFICIENCY SCREENING: ICD-10-CM

## 2023-09-14 DIAGNOSIS — I10 BENIGN ESSENTIAL HYPERTENSION: ICD-10-CM

## 2023-09-14 DIAGNOSIS — R93.7 ABNORMAL BONE DENSITY SCREENING: ICD-10-CM

## 2023-09-14 DIAGNOSIS — E27.9 ADRENAL NODULE (H): ICD-10-CM

## 2023-09-14 DIAGNOSIS — Z13.220 SCREENING CHOLESTEROL LEVEL: ICD-10-CM

## 2023-09-14 DIAGNOSIS — Z78.0 ASYMPTOMATIC MENOPAUSAL STATE: ICD-10-CM

## 2023-09-14 PROBLEM — N18.30 CHRONIC KIDNEY DISEASE, STAGE 3 (H): Status: RESOLVED | Noted: 2020-10-22 | Resolved: 2023-09-14

## 2023-09-14 LAB
ALBUMIN SERPL BCG-MCNC: 4.3 G/DL (ref 3.5–5.2)
ALP SERPL-CCNC: 119 U/L (ref 35–104)
ALT SERPL W P-5'-P-CCNC: 16 U/L (ref 0–50)
ANION GAP SERPL CALCULATED.3IONS-SCNC: 11 MMOL/L (ref 7–15)
AST SERPL W P-5'-P-CCNC: 19 U/L (ref 0–45)
BILIRUB SERPL-MCNC: 0.3 MG/DL
BUN SERPL-MCNC: 17.4 MG/DL (ref 8–23)
CALCIUM SERPL-MCNC: 10 MG/DL (ref 8.8–10.2)
CHLORIDE SERPL-SCNC: 104 MMOL/L (ref 98–107)
CHOLEST SERPL-MCNC: 208 MG/DL
CREAT SERPL-MCNC: 0.99 MG/DL (ref 0.51–0.95)
DEPRECATED CALCIDIOL+CALCIFEROL SERPL-MC: 45 UG/L (ref 20–75)
DEPRECATED HCO3 PLAS-SCNC: 26 MMOL/L (ref 22–29)
EGFRCR SERPLBLD CKD-EPI 2021: 62 ML/MIN/1.73M2
GLUCOSE SERPL-MCNC: 98 MG/DL (ref 70–99)
HDLC SERPL-MCNC: 45 MG/DL
LDLC SERPL CALC-MCNC: 98 MG/DL
NONHDLC SERPL-MCNC: 163 MG/DL
POTASSIUM SERPL-SCNC: 3.8 MMOL/L (ref 3.4–5.3)
PROT SERPL-MCNC: 7.3 G/DL (ref 6.4–8.3)
SODIUM SERPL-SCNC: 141 MMOL/L (ref 136–145)
TRIGL SERPL-MCNC: 326 MG/DL

## 2023-09-14 PROCEDURE — 80061 LIPID PANEL: CPT | Performed by: STUDENT IN AN ORGANIZED HEALTH CARE EDUCATION/TRAINING PROGRAM

## 2023-09-14 PROCEDURE — 36415 COLL VENOUS BLD VENIPUNCTURE: CPT | Performed by: STUDENT IN AN ORGANIZED HEALTH CARE EDUCATION/TRAINING PROGRAM

## 2023-09-14 PROCEDURE — 80053 COMPREHEN METABOLIC PANEL: CPT | Performed by: STUDENT IN AN ORGANIZED HEALTH CARE EDUCATION/TRAINING PROGRAM

## 2023-09-14 PROCEDURE — 82306 VITAMIN D 25 HYDROXY: CPT | Performed by: STUDENT IN AN ORGANIZED HEALTH CARE EDUCATION/TRAINING PROGRAM

## 2023-09-14 PROCEDURE — G0402 INITIAL PREVENTIVE EXAM: HCPCS | Performed by: STUDENT IN AN ORGANIZED HEALTH CARE EDUCATION/TRAINING PROGRAM

## 2023-09-14 RX ORDER — LISINOPRIL 10 MG/1
10 TABLET ORAL DAILY
Qty: 90 TABLET | Refills: 4 | Status: SHIPPED | OUTPATIENT
Start: 2023-09-14

## 2023-09-14 ASSESSMENT — PAIN SCALES - GENERAL: PAINLEVEL: NO PAIN (0)

## 2023-09-14 ASSESSMENT — ENCOUNTER SYMPTOMS
FEVER: 0
PARESTHESIAS: 0
NAUSEA: 0
SHORTNESS OF BREATH: 0
DIARRHEA: 0
HEADACHES: 0
HEMATURIA: 0
JOINT SWELLING: 0
EYE PAIN: 0
SORE THROAT: 0
ARTHRALGIAS: 0
HEMATOCHEZIA: 0
COUGH: 0
CONSTIPATION: 0
BREAST MASS: 0
MYALGIAS: 0
HEARTBURN: 0
ABDOMINAL PAIN: 0
CHILLS: 0
DYSURIA: 0
WEAKNESS: 0
NERVOUS/ANXIOUS: 0
FREQUENCY: 0
DIZZINESS: 0
PALPITATIONS: 1

## 2023-09-14 ASSESSMENT — ACTIVITIES OF DAILY LIVING (ADL): CURRENT_FUNCTION: NO ASSISTANCE NEEDED

## 2023-09-14 NOTE — PATIENT INSTRUCTIONS
[facility] Same Day or Next Day Provider:  -JESICA Roca  -Send [employer] message or call clinic  -Her appointments are often not visible on MyChart        CALCIUM Recommendations:  Men and Postmenopausal women on estrogen: 1200mg/day  Postmenopausal women not on estrogen: 1500 mg/day    If you are not eating dairy products you also need 400 IU of vitamin D per day which can be obtained in either a multivitamin or in some of the Calcium tablets.    Dietary sources: These also contain vitamin D  Milk                                         8 oz            300 mg  Yogurt                                    1 cup           400 mg  Hard cheese                          1.5 oz          300 mg  Cottage cheese                     2 cup           300 mg  Orange juice with Calcium       8 oz            300 mg  Low fat dairy sources are recommended    Supplements:  Tums EX                             300 mg  Tums Ultra                          400 mg  Caltrate 600                        600 mg  Oscal                                  500 mg  Oscal/D                              500 mg plus vitamin D  Women's Formula Multivitamin    450 mg       Patient Education   Personalized Prevention Plan  You are due for the preventive services outlined below.  Your care team is available to assist you in scheduling these services.  If you have already completed any of these items, please share that information with your care team to update in your medical record.  Health Maintenance Due   Topic Date Due    ANNUAL REVIEW OF HM ORDERS  Never done    Pneumococcal Vaccine (2 - PPSV23 or PCV20) 08/30/2021    COVID-19 Vaccine (4 - Pfizer series) 03/08/2022    Cholesterol Lab  08/24/2023    Kidney Microalbumin Urine Test  08/24/2023    Flu Vaccine (1) Never done    Zoster (Shingles) Vaccine (2 of 2) 06/28/2023     Learning About Dietary Guidelines  What are the Dietary Guidelines for Americans?     Dietary Guidelines for Americans  provide tips for eating well and staying healthy. This helps reduce the risk for long-term (chronic) diseases.  These guidelines recommend that you:  Eat and drink the right amount for you. The U.[place] government's food guide is called MyPlate. It can help you make your own well-balanced eating plan.  Try to balance your eating with your activity. This helps you stay at a healthy weight.  Drink alcohol in moderation, if at all.  Limit foods high in salt, saturated fat, trans fat, and added sugar.  These guidelines are from the U.S. Department of Agriculture and the U.S. Department of Health and Human Services. They are updated every 5 years.  What is MyPlate?  MyPlate is the U.[place]. government's food guide. It can help you make your own well-balanced eating plan. A balanced eating plan means that you eat enough, but not too much, and that your food gives you the nutrients you need to stay healthy.  MyPlate focuses on eating plenty of whole grains, fruits, and vegetables, and on limiting fat and sugar. It is available online at www.ChooseMyPlate.gov.  How can you get started?  If you're trying to eat healthier, you can slowly change your eating habits over time. You don't have to make big changes all at once. Start by adding one or two healthy foods to your meals each day.  Grains  Choose whole-grain breads and cereals and whole-wheat pasta and whole-grain crackers.  Vegetables  Eat a variety of vegetables every day. They have lots of nutrients and are part of a heart-healthy diet.  Fruits  Eat a variety of fruits every day. Fruits contain lots of nutrients. Choose fresh fruit instead of fruit juice.  Protein foods  Choose fish and lean poultry more often. Eat red meat and fried meats less often. Dried beans, tofu, and nuts are also good sources of protein.  Dairy  Choose low-fat or fat-free products from this food group. If you have problems digesting milk, try eating cheese or yogurt instead.  Fats and oils  Limit fats  "and oils if you're trying to cut calories. Choose healthy fats when you cook. These include canola oil and olive oil.  Where can you learn more?  Go to https://www.healthwise.net/patiented  Enter D676 in the search box to learn more about \"Learning About Dietary Guidelines.\"       "

## 2023-09-14 NOTE — PROGRESS NOTES
"SUBJECTIVE:   Marivel is a 68 year old who presents for Preventive Visit.      9/14/2023     9:57 AM   Additional Questions   Roomed by Gretchen         9/14/2023     9:57 AM   Patient Reported Additional Medications   Patient reports taking the following new medications None       Are you in the first 12 months of your Medicare coverage?  Yes,  Visual Acuity:  Right Eye: 20/20   Left Eye: 20/20  Both Eyes: 20/20    Healthy Habits:     In general, how would you rate your overall health?  Excellent    Frequency of exercise:  2-3 days/week    Duration of exercise:  15-30 minutes    Do you usually eat at least 4 servings of fruit and vegetables a day, include whole grains    & fiber and avoid regularly eating high fat or \"junk\" foods?  No    Taking medications regularly:  Yes    Barriers to taking medications:  None    Medication side effects:  None    Ability to successfully perform activities of daily living:  No assistance needed    Home Safety:  No safety concerns identified    Hearing Impairment:  No hearing concerns    In the past 6 months, have you been bothered by leaking of urine?  No    In general, how would you rate your overall mental or emotional health?  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 (For example, a Health Directive, POLST, or a discussion with a medical provider or your loved ones about your wishes): Yes, patient states has an Advance Care Planning document and will bring a copy to the clinic.      Fall risk  Fallen 2 or more times in the past year?: No  Any fall with injury in the past year?: No    Cognitive Screening   1) Repeat 3 items (Leader, Season, Table)    2) Clock draw: NORMAL  3) 3 item recall: Recalls 3 " objects  Results: 3 items recalled: COGNITIVE IMPAIRMENT LESS LIKELY    Mini-CogTM Copyright NEPTALI Graham. Licensed by the author for use in [facility]; reprinted with permission (janes@.[facility]). All rights reserved.      Do you have sleep apnea, excessive snoring or daytime drowsiness? : no    Reviewed and updated as needed this visit by clinical staff   Tobacco  Allergies  Meds              Reviewed and updated as needed this visit by Provider                 Social History     Tobacco Use    Smoking status: Never    Smokeless tobacco: Never   Substance Use Topics    Alcohol use: Yes     Comment: occasional             9/13/2023     9:18 AM   Alcohol Use   Prescreen: >3 drinks/day or >7 drinks/week? No     Do you have a current opioid prescription? No  Do you use any other controlled substances or medications that are not prescribed by a provider? None          Current providers sharing in care for this patient include:   Patient Care Team:  Danni Oleary MD as PCP - General (Internal Medicine - Pediatrics)  Daniel Lao MD as Assigned PCP  Katie Bates MD as MD (Internal Medicine - Pediatrics)  Katie Esparza MD as MD (Dermatology)  Kay Gee MD as MD (Otolaryngology)  Kay Gee MD as Assigned Surgical Provider  Daniel Lao MD as Assigned Pain Medication Provider    The following health maintenance items are reviewed in Epic and correct as of today:  Health Maintenance   Topic Date Due    Pneumococcal Vaccine: 65+ Years (2 - PPSV23 or PCV20) 08/30/2021    COVID-19 Vaccine (4 - Pfizer series) 03/08/2022    MICROALBUMIN  08/24/2023    INFLUENZA VACCINE (1) Never done    ZOSTER IMMUNIZATION (2 of 2) 06/28/2023    MAMMO SCREENING  07/11/2024    BMP  09/05/2024    MEDICARE ANNUAL WELLNESS VISIT  09/14/2024    ANNUAL REVIEW OF HM ORDERS  09/14/2024    FALL RISK ASSESSMENT  09/14/2024    DTAP/TDAP/TD IMMUNIZATION (3 - Td  or Tdap) 12/23/2026    LIPID  08/24/2027    COLORECTAL CANCER SCREENING  12/08/2027    ADVANCE CARE PLANNING  09/14/2028    DEXA  06/18/2033    HEPATITIS C SCREENING  Completed    MIGRAINE ACTION PLAN  Completed    PHQ-2 (once per calendar year)  Completed    IPV IMMUNIZATION  Aged Out    HPV IMMUNIZATION  Aged Out    MENINGITIS IMMUNIZATION  Aged Out    PAP  Discontinued         FHS-7:       6/27/2022     8:35 AM 8/23/2022     9:38 PM 7/11/2023    10:04 AM 9/13/2023     9:33 AM   Breast CA Risk Assessment (FHS-7)   Did any of your first-degree relatives have breast or ovarian cancer? Yes Yes  Yes   Did any of your relatives have bilateral breast cancer? No No  No   Did any man in your family have breast cancer? No No  No   Did any woman in your family have breast and ovarian cancer? No Yes  Yes   Did any woman in your family have breast cancer before age 50 y? No Yes Yes Yes   Do you have 2 or more relatives with breast and/or ovarian cancer? No No  No   Do you have 2 or more relatives with breast and/or bowel cancer? Yes No  No       Mammogram Screening: Recommended annual mammography  Pertinent mammograms are reviewed under the imaging tab.    Review of Systems   Constitutional:  Negative for chills and fever.   HENT:  Negative for congestion, ear pain, hearing loss and sore throat.    Eyes:  Negative for pain and visual disturbance.   Respiratory:  Negative for cough and shortness of breath.    Cardiovascular:  Positive for palpitations. Negative for chest pain and peripheral edema.   Gastrointestinal:  Negative for abdominal pain, constipation, diarrhea, heartburn, hematochezia and nausea.   Breasts:  Negative for tenderness, breast mass and discharge.   Genitourinary:  Negative for dysuria, frequency, genital sores, hematuria, pelvic pain, urgency, vaginal bleeding and vaginal discharge.   Musculoskeletal:  Negative for arthralgias, joint swelling and myalgias.   Skin:  Negative for rash.   Neurological:   "Negative for dizziness, weakness, headaches and paresthesias.   Psychiatric/Behavioral:  Negative for mood changes. The patient is not nervous/anxious.      OBJECTIVE:   /80   Pulse 82   Temp 98.2  F (36.8  C)   Resp 16   Ht 1.619 m (5' 3.75\")   Wt 78.9 kg (174 lb)   SpO2 99%   BMI 30.10 kg/m   Estimated body mass index is 30.1 kg/m  as calculated from the following:    Height as of this encounter: 1.619 m (5' 3.75\").    Weight as of this encounter: 78.9 kg (174 lb).  Physical Exam      ASSESSMENT / PLAN:       ICD-10-CM    1. Welcome to Medicare preventive visit  Z00.00       2. Benign essential hypertension  I10 lisinopril (ZESTRIL) 10 MG tablet     Comprehensive metabolic panel (BMP + Alb, Alk Phos, ALT, AST, Total. Bili, TP)     Comprehensive metabolic panel (BMP + Alb, Alk Phos, ALT, AST, Total. Bili, TP)      3. Gall stone pancreatitis  K85.10       4. Screening cholesterol level  Z13.220 Lipid panel reflex to direct LDL Non-fasting     Lipid panel reflex to direct LDL Non-fasting      5. Encounter for vitamin deficiency screening  Z13.21 Vitamin D Deficiency     Vitamin D Deficiency      6. Abnormal bone density screening  R93.7 DX Hip/Pelvis/Spine      7. Asymptomatic menopausal state  Z78.0 DX Hip/Pelvis/Spine      8. Adrenal nodule (H)  E27.8         3. S/p cholecystectomy. Doing well. Abstaining from alcohol until November per surgery recommendations.  8. Has follow up imaging scheduled.    MED REC REQUIRED  Post Medication Reconciliation Status: patient was not discharged from an inpatient facility or TCU    COUNSELING:  Reviewed preventive health counseling, as reflected in patient instructions      BMI:   Estimated body mass index is 30.1 kg/m  as calculated from the following:    Height as of this encounter: 1.619 m (5' 3.75\").    Weight as of this encounter: 78.9 kg (174 lb).     She reports that she has never smoked. She has never used smokeless tobacco.      Appropriate preventive " services were discussed with this patient, including applicable screening as appropriate for cardiovascular disease, diabetes, osteopenia/osteoporosis, and glaucoma.  As appropriate for age/gender, discussed screening for colorectal cancer, prostate cancer, breast cancer, and cervical cancer. Checklist reviewing preventive services available has been given to the patient.    Reviewed patients plan of care and provided an AVS. The Basic Care Plan (routine screening as documented in Health Maintenance) for Marivel meets the Care Plan requirement. This Care Plan has been established and reviewed with the Patient.          Danni Oleary MD  [facility]    Identified Health Risks:  I have reviewed Opioid Use Disorder and Substance Use Disorder risk factors and made any needed referrals. The patient was counseled and encouraged to consider modifying their diet and eating habits. She was provided with information on recommended healthy diet options.

## 2023-10-19 ENCOUNTER — ANCILLARY PROCEDURE (OUTPATIENT)
Dept: BONE DENSITY | Facility: CLINIC | Age: 68
End: 2023-10-19
Attending: STUDENT IN AN ORGANIZED HEALTH CARE EDUCATION/TRAINING PROGRAM
Payer: COMMERCIAL

## 2023-10-19 DIAGNOSIS — Z78.0 ASYMPTOMATIC MENOPAUSAL STATE: ICD-10-CM

## 2023-10-19 DIAGNOSIS — R93.7 ABNORMAL BONE DENSITY SCREENING: ICD-10-CM

## 2023-10-19 PROCEDURE — 77080 DXA BONE DENSITY AXIAL: CPT | Performed by: INTERNAL MEDICINE

## 2023-11-01 ENCOUNTER — MYC MEDICAL ADVICE (OUTPATIENT)
Dept: PEDIATRICS | Facility: CLINIC | Age: 68
End: 2023-11-01

## 2023-11-01 DIAGNOSIS — E27.9 ADRENAL NODULE (H): Primary | ICD-10-CM

## 2023-11-01 NOTE — TELEPHONE ENCOUNTER
Patient calling, inquiring on order for follow up imaging. Per CT from 8/20/23 impression: 4.  Multiple bilateral indeterminate adrenal nodules favored to represent adenomas. Recommend nonemergent follow-up with adrenal CT.       Osorio FARRELL RN 11/1/2023 at 4:56 PM

## 2023-11-03 NOTE — TELEPHONE ENCOUNTER
Hello,     Im looking over the imaging report from 8/20 when the nodules were seen, the recommendation was a non-emergent adrenal CT.  I do not see that this is an ordering option.  Triage RN, Can we please call radiology to have them look over this read and see specifically what order they advise?  Do they want an MRI?  Or do they want another CT scan, and if so, the only option I am seeing is another CT of the abdomen.      Thank you,    Alis Quintanilla PA-C

## 2023-11-03 NOTE — TELEPHONE ENCOUNTER
[facility] called and spoke with Alina, in [facility] radiology. Radiology advised the current order that was placed on 9/5/23 is exactly what they recommend.    Unsure of what patient is referring to with the MRI.     Thanks,  Raina COWAN RN on 11/3/2023 at 12:54 PM

## 2023-11-06 NOTE — TELEPHONE ENCOUNTER
Thank you for this clarity.  So there is an order placed and it is what was advised?  Is this what was denied by insurance?  I am unfortunately unsure where to go from there.  Has patient contacted insurance to inquire why this was denied?    Thank you,    Alis Quintanilla PA-C

## 2023-11-06 NOTE — TELEPHONE ENCOUNTER
Call placed to pt regarding her [facility] message  She meant CT not MRI and she does not know why the CT was denied    Call placed to Radiology to find out what the next steps are- can we appeal the denial?  They advised I call their PA team Rocco at [phone]  Left detailed message with pt MRN and why I am calling- advised to please return call to the clinic and ask to speak to a triage RN    Called pt with an update    Anabel Bond RN on 11/6/2023 at 2:02 PM

## 2023-12-20 NOTE — TELEPHONE ENCOUNTER
Called and spoke with patient. RN relayed provider message below. Patient verbalized understanding and will let clinic know if there are any issues.     Osorio FARRELL RN 12/20/2023 at 11:19 AM

## 2023-12-20 NOTE — TELEPHONE ENCOUNTER
May benefit more from dedicated adrenal MRI rather than repeat CT scan.    Adrenal MRI ordered to follow up nodules seen on CT scan in August.    Please let patient know MRI ordered but would recommend checking with insurance first about coverage given past denial of imaging study.    Danni Oleary MD  Internal Medicine & Pediatrics  [facility]  She/her

## 2024-01-11 ENCOUNTER — HOSPITAL ENCOUNTER (OUTPATIENT)
Dept: MRI IMAGING | Facility: CLINIC | Age: 69
Discharge: HOME | End: 2024-01-11
Attending: STUDENT IN AN ORGANIZED HEALTH CARE EDUCATION/TRAINING PROGRAM | Admitting: STUDENT IN AN ORGANIZED HEALTH CARE EDUCATION/TRAINING PROGRAM
Payer: COMMERCIAL

## 2024-01-11 DIAGNOSIS — E27.9 ADRENAL NODULE (H): ICD-10-CM

## 2024-01-11 PROCEDURE — 255N000002 HC RX 255 OP 636: Performed by: STUDENT IN AN ORGANIZED HEALTH CARE EDUCATION/TRAINING PROGRAM

## 2024-01-11 PROCEDURE — A9585 GADOBUTROL INJECTION: HCPCS | Performed by: STUDENT IN AN ORGANIZED HEALTH CARE EDUCATION/TRAINING PROGRAM

## 2024-01-11 PROCEDURE — 74183 MRI ABD W/O CNTR FLWD CNTR: CPT

## 2024-01-11 RX ORDER — GADOBUTROL 604.72 MG/ML
8 INJECTION INTRAVENOUS ONCE
Status: COMPLETED | OUTPATIENT
Start: 2024-01-11 | End: 2024-01-11

## 2024-01-11 RX ADMIN — GADOBUTROL 8 ML: 604.72 INJECTION INTRAVENOUS at 12:44

## 2024-03-12 ENCOUNTER — TELEPHONE (OUTPATIENT)
Dept: PEDIATRICS | Facility: CLINIC | Age: 69
End: 2024-03-12

## 2024-03-12 ENCOUNTER — OFFICE VISIT (OUTPATIENT)
Dept: PEDIATRICS | Facility: CLINIC | Age: 69
End: 2024-03-12
Payer: COMMERCIAL

## 2024-03-12 VITALS
HEIGHT: 63 IN | DIASTOLIC BLOOD PRESSURE: 76 MMHG | BODY MASS INDEX: 30.62 KG/M2 | RESPIRATION RATE: 14 BRPM | HEART RATE: 70 BPM | TEMPERATURE: 97.4 F | SYSTOLIC BLOOD PRESSURE: 127 MMHG | WEIGHT: 172.8 LBS | OXYGEN SATURATION: 100 %

## 2024-03-12 DIAGNOSIS — F41.9 ANXIETY: ICD-10-CM

## 2024-03-12 DIAGNOSIS — R30.0 DYSURIA: Primary | ICD-10-CM

## 2024-03-12 DIAGNOSIS — N95.1 HOT FLASHES DUE TO MENOPAUSE: ICD-10-CM

## 2024-03-12 DIAGNOSIS — H92.02 LEFT EAR PAIN: ICD-10-CM

## 2024-03-12 LAB
ALBUMIN UR-MCNC: NEGATIVE MG/DL
APPEARANCE UR: CLEAR
BILIRUB UR QL STRIP: NEGATIVE
COLOR UR AUTO: YELLOW
GLUCOSE UR STRIP-MCNC: NEGATIVE MG/DL
HGB UR QL STRIP: NEGATIVE
KETONES UR STRIP-MCNC: NEGATIVE MG/DL
LEUKOCYTE ESTERASE UR QL STRIP: NEGATIVE
NITRATE UR QL: NEGATIVE
PH UR STRIP: 5.5 [PH] (ref 5–7)
SP GR UR STRIP: <=1.005 (ref 1–1.03)
UROBILINOGEN UR STRIP-ACNC: 0.2 E.U./DL

## 2024-03-12 PROCEDURE — 99214 OFFICE O/P EST MOD 30 MIN: CPT | Performed by: PHYSICIAN ASSISTANT

## 2024-03-12 PROCEDURE — 81003 URINALYSIS AUTO W/O SCOPE: CPT | Performed by: PHYSICIAN ASSISTANT

## 2024-03-12 RX ORDER — HYDROXYZINE HYDROCHLORIDE 25 MG/1
25 TABLET, FILM COATED ORAL 3 TIMES DAILY PRN
Qty: 30 TABLET | Refills: 1 | Status: SHIPPED | OUTPATIENT
Start: 2024-03-12 | End: 2024-10-07

## 2024-03-12 RX ORDER — RESPIRATORY SYNCYTIAL VIRUS VACCINE 120MCG/0.5
0.5 KIT INTRAMUSCULAR ONCE
Qty: 1 EACH | Refills: 0 | Status: CANCELLED | OUTPATIENT
Start: 2024-03-12 | End: 2024-03-12

## 2024-03-12 RX ORDER — NAPROXEN SODIUM 220 MG
220 TABLET ORAL PRN
COMMUNITY

## 2024-03-12 ASSESSMENT — ANXIETY QUESTIONNAIRES
GAD7 TOTAL SCORE: 2
3. WORRYING TOO MUCH ABOUT DIFFERENT THINGS: SEVERAL DAYS
7. FEELING AFRAID AS IF SOMETHING AWFUL MIGHT HAPPEN: NOT AT ALL
5. BEING SO RESTLESS THAT IT IS HARD TO SIT STILL: NOT AT ALL
IF YOU CHECKED OFF ANY PROBLEMS ON THIS QUESTIONNAIRE, HOW DIFFICULT HAVE THESE PROBLEMS MADE IT FOR YOU TO DO YOUR WORK, TAKE CARE OF THINGS AT HOME, OR GET ALONG WITH OTHER PEOPLE: SOMEWHAT DIFFICULT
6. BECOMING EASILY ANNOYED OR IRRITABLE: NOT AT ALL
7. FEELING AFRAID AS IF SOMETHING AWFUL MIGHT HAPPEN: NOT AT ALL
2. NOT BEING ABLE TO STOP OR CONTROL WORRYING: NOT AT ALL
4. TROUBLE RELAXING: NOT AT ALL
GAD7 TOTAL SCORE: 2
8. IF YOU CHECKED OFF ANY PROBLEMS, HOW DIFFICULT HAVE THESE MADE IT FOR YOU TO DO YOUR WORK, TAKE CARE OF THINGS AT HOME, OR GET ALONG WITH OTHER PEOPLE?: SOMEWHAT DIFFICULT
GAD7 TOTAL SCORE: 2
1. FEELING NERVOUS, ANXIOUS, OR ON EDGE: SEVERAL DAYS

## 2024-03-12 ASSESSMENT — PAIN SCALES - GENERAL: PAINLEVEL: NO PAIN (0)

## 2024-03-12 NOTE — PATIENT INSTRUCTIONS
Try Hydroxyzine for anxiety as needed, take 30 minutes prior to car ride.   Consider daily medication. We discussed Venlafaxine to consider. Medication also helps with Hot Flashes.     Get urine done today. If positive for infection will treat with antibiotics.   If negative, consider referral to gynecology or urology

## 2024-03-12 NOTE — PROGRESS NOTES
Assessment & Plan     Dysuria  Recommend UA for further evaluation today.   Patient not having symptoms today.   If negative may consider recommending UA when symptomatic or referral to gyn/urology.   - UA Macroscopic with reflex to Microscopic and Culture - Clinic Collect    Anxiety  Discussed starting daily medication (Venlafaxine or something similar) and patient not agreeable today.   Recommend discussing this with therapist.   Will start medication as needed but will have to take prior to car ride and may be difficult to predict when this might be.  Pt does have appointment with eye doctor today and plans to bring up vision concerns as this may be contributing to anxiety  - hydrOXYzine HCl (ATARAX) 25 MG tablet  Dispense: 30 tablet; Refill: 1    Hot flashes due to menopause  Patient states not very bothersome to daily life but they are there.   Consider SSRi/SNRI that would also treat hot flashes if needed.    Left ear pain  Suspect TMJ. Encouraged some massage and NSAIDs as needed.  Could seen ENT or try PT if not improving.   Could discuss with dentist at next visit.          Fabricio Orta is a 68 year old, presenting for the following health issues:  UTI        3/12/2024     8:40 AM   Additional Questions   Roomed by Eve NO   Accompanied by N/A         3/12/2024     8:40 AM   Patient Reported Additional Medications   Patient reports taking the following new medications None     UTI    History of Present Illness       Reason for visit:  Bladder, hot flashes, anxiety meds, outer ear pain  Symptom onset:  More than a month (2-3 months)  Symptoms include:  Burning when I urinate sometimes, hot flashes,  Symptom intensity:  Mild  Symptom progression:  Staying the same  Had these symptoms before:  Yes  Has tried/received treatment for these symptoms:  No  What makes it worse:  Unknown  What makes it better:  Unknown    She eats 2-3 servings of fruits and vegetables daily.She consumes 0 sweetened  beverage(s) daily.She exercises with enough effort to increase her heart rate 30 to 60 minutes per day.  She exercises with enough effort to increase her heart rate 5 days per week.   She is taking medications regularly.    Dysuria   Patient is a 68 y.o. female who presents to the clinic for dysuria. Has history of fallen bladder (2006), is worried about the same thing. Reports she has burning with urination on and off. She does not have it today. She denies feeling like she can't empty or has bladder spasms. Patient states the burning occurs during urination and then about 10 minutes after and then goes away. She feels the urine looks more yellow when she has the symptoms. She denies abdominal pain, back pain or fever. Denies allergies to antibiotics. No recent antibiotics. Denies vaginal discharge or recurrent UTIs.     Patient also had gallbladder removed in August, has noticed some changes in bowel movement/urination since then.    Anxiety  Patient wishes to discuss anxiety medication for anxiety when riding as a passenger in cars. She is seeing a therapist weekly about this concern. She reports she has anxiety about driving herself but worse when riding with others. She feels people don't stop soon enough and everything goes around her faster than it should. She does have some concerns it is a control thing but also wonders if she is struggling with vision and that's why she perceives others driving differently. Patient has never been on anxiety or depression medication. She does feel she has been a little bit more down due to these concerns. She does find herself trying to map out what might be the simplest way and without going on the highway. She does get nervous about others driving her days before it occurs.    Hot flashes  Patient is reporting some hot flashes. She typically just deals with them. Not better or worse. She had partial hysterectomy in 2006. Has not been on hormones or other medications for  "hot flashes.    Ear pain  Patient reports some left ear pain that goes around the outer part of her ear. Patient states some massage of the periauricular area helps relieve pain. It can be severe and then goes away. Sometimes finds this when she wakes up and is laying on that side.           Review of Systems  Constitutional, HEENT, cardiovascular, pulmonary, gi and gu systems are negative, except as otherwise noted.      Objective    /76 (BP Location: Right arm, Patient Position: Sitting, Cuff Size: Adult Regular)   Pulse 70   Temp 97.4  F (36.3  C) (Tympanic)   Resp 14   Ht 1.59 m (5' 2.6\")   Wt 78.4 kg (172 lb 12.8 oz)   SpO2 100%   BMI 31.00 kg/m    Body mass index is 31 kg/m .    Physical Exam   GENERAL: alert and no distress  EYES: Eyes grossly normal to inspection, PERRL and conjunctivae and sclerae normal  HENT: ear canals and TM's normal, nose and mouth without ulcers or lesions  NECK: no adenopathy, no asymmetry, masses, or scars  RESP: lungs clear to auscultation - no rales, rhonchi or wheezes  CV: regular rate and rhythm, normal S1 S2, no S3 or S4, no murmur, click or rub, no peripheral edema  ABDOMEN: soft, nontender, no hepatosplenomegaly, no masses and bowel sounds normal  MS: no gross musculoskeletal defects noted, no edema  BACK: no CVA tenderness  SKIN: no suspicious lesions or rashes  NEURO: Normal strength and tone, mentation intact and speech normal  PSYCH: mentation appears normal, affect normal/bright        Signed Electronically by: Dede Larson PA-C    "

## 2024-03-12 NOTE — TELEPHONE ENCOUNTER
RN attempted to reach patient. LVMTCB and speak with nurse.       Raina COWAN RN on 3/12/2024 at 1:33 PM

## 2024-03-12 NOTE — TELEPHONE ENCOUNTER
----- Message from Dede Larson PA-C sent at 3/12/2024 12:52 PM CDT -----  Please inform patient that urine is normal. I recommend either watchful waiting and see what happens. If she get symptoms again, she could submit an e-visit and we could do another urine test to see if urine is abnormal at that time. The other option would be to refer to gynecologist to discuss urinary symptoms as it may be related to previous bladder sling. Please let me know what patient would prefer to do. Thanks!    Dede Larson PA-C

## 2024-03-13 NOTE — TELEPHONE ENCOUNTER
RN called and spoke with patient. Relayed provider message. Patient was given an opportunity to ask questions, verbalized understanding of plan, and is agreeable.       Patient would prefer referral at this time.     Raina COWAN RN on 3/13/2024 at 8:47 AM

## 2024-06-03 ENCOUNTER — OFFICE VISIT (OUTPATIENT)
Dept: OBGYN | Facility: CLINIC | Age: 69
End: 2024-06-03
Attending: PHYSICIAN ASSISTANT
Payer: COMMERCIAL

## 2024-06-03 VITALS — SYSTOLIC BLOOD PRESSURE: 120 MMHG | WEIGHT: 171.1 LBS | DIASTOLIC BLOOD PRESSURE: 80 MMHG | BODY MASS INDEX: 30.7 KG/M2

## 2024-06-03 DIAGNOSIS — F43.21 ADJUSTMENT DISORDER WITH DEPRESSED MOOD: ICD-10-CM

## 2024-06-03 DIAGNOSIS — N95.1 VASOMOTOR SYMPTOMS DUE TO MENOPAUSE: Primary | ICD-10-CM

## 2024-06-03 DIAGNOSIS — R30.0 DYSURIA: ICD-10-CM

## 2024-06-03 DIAGNOSIS — N81.11 CYSTOCELE, MIDLINE: ICD-10-CM

## 2024-06-03 PROCEDURE — 99204 OFFICE O/P NEW MOD 45 MIN: CPT | Performed by: OBSTETRICS & GYNECOLOGY

## 2024-06-03 RX ORDER — CITALOPRAM HYDROBROMIDE 20 MG/1
20 TABLET ORAL DAILY
Qty: 30 TABLET | Refills: 11 | Status: SHIPPED | OUTPATIENT
Start: 2024-06-03

## 2024-06-03 NOTE — PROGRESS NOTES
"SUBJECTIVE:   CC: menopause symptoms, dysuria.                                               Marivel Hernandez is a 68 year old  female who presents to clinic today for evaluation of dysuria that occurred a few months ago. She felt like she felt a bulge, but no longer has this sensation. UA was negative 2 months ago. Symptoms have resolved with increased hydration.  - she reports a mesh based cystocele repair at time of her  \"partial hysterectomy\", which was performed abdominally.    S/p hysterectomy in  through [facility], uncertain when menopause occurred. Previously she had intermittent hot flashes but now in the last few months she reports frequent hot flashes, more moodiness, and more \"down days\".    She had her gallbladder removed in 2023 which is when her above symptoms started. At the time there was noted a growth on her adrenal gland.     Problem list and histories reviewed & adjusted, as indicated.  Additional history: as documented.      Patient Active Problem List   Diagnosis    Migraine    Sciatica    CARDIOVASCULAR SCREENING; LDL GOAL LESS THAN 160    Class 1 obesity due to excess calories with serious comorbidity and body mass index (BMI) of 32.0 to 32.9 in adult    Osteopenia of spine    Benign essential hypertension    Lactic acidosis    Gall stone pancreatitis    Adrenal nodule on CT 2023 - nonemergent adrenal CT (H)     Past Surgical History:   Procedure Laterality Date    ABDOMEN SURGERY  23    COLONOSCOPY  2007    HYSTERECTOMY, AMY  2006    LAPAROSCOPIC CHOLECYSTECTOMY WITH CHOLANGIOGRAMS N/A 2023    Procedure: CHOLECYSTECTOMY, LAPAROSCOPIC, WITH CHOLANGIOGRAM;  Surgeon: Kavon Duckworth MD;  Location:  OR    [facility] NONSPECIFIC PROCEDURE  1994    Tubal Ligation      Social History     Tobacco Use    Smoking status: Never    Smokeless tobacco: Never   Substance Use Topics    Alcohol use: Yes     Comment: occasional      Problem (# of " Occurrences) Relation (Name,Age of Onset)    Anxiety Disorder (1) Mother (Jazmin)    Cancer (1) Mother (Jazmin): colon at age 90    Diabetes (1) Maternal Aunt: all 6 aunts (Mother's sisters)    Heart Disease (1) Father (Henry, 60): aneurysm    Hypertension (2) Mother (Jazmin), Maternal Grandmother (Sabine)    Breast Cancer (1) Sister (Bárbara, 47)    Coronary Artery Disease (1) Father (Henry):  at 60 from Aneurysm    Colon Cancer (1) Mother (Jazmin)              Current Outpatient Medications   Medication Sig Dispense Refill    acetaminophen (TYLENOL) 325 MG tablet Take 325-650 mg by mouth every 6 hours as needed for mild pain      hydrOXYzine HCl (ATARAX) 25 MG tablet Take 1 tablet (25 mg) by mouth 3 times daily as needed for anxiety 30 tablet 1    lisinopril (ZESTRIL) 10 MG tablet Take 1 tablet (10 mg) by mouth daily 90 tablet 4    multivitamin w/minerals (THERA-VIT-M) tablet Take 1 tablet by mouth daily      naproxen sodium (ANAPROX) 220 MG tablet Take 220 mg by mouth as needed for moderate pain       No current facility-administered medications for this visit.     Allergies   Allergen Reactions    No Known Allergies        OBJECTIVE:   /80   Wt 77.6 kg (171 lb 1.6 oz)   BMI 30.70 kg/m     Const: sitting in chair in no acute distress, comfortable  Eyes: no scleral icterus, EOMI  CV: regular rate, well perfused  Pulm: no increased work of breathing, no cough  Skin: warm and dry, no rashes/lesions  Psych: mood stable, appropriate affect  Abd: soft, no hepatosplenomegaly, non-tender to palpation  Neuro: A+Ox3   : External genitalia atrophic.  No obvious excoriations or rashes. She has milia and multiple sebaceous cysts along the medial aspect of both labia majora, R>L, non-tender or inflamed. Bartholins, urethra, normal.  Pale vaginal mucosa.  SSE: cervix is surgically absent. With single blade exam a grade 1 cystocele is appreciated with valsalva. No leaking.  Bimanual: surgically absent uterus and  cervix, cuff intact. No adnexal masses or tenderness appreciated.     ASSESSMENT/PLAN:                                                    Marivel Hernandez is a 68 year old female  here for evaluation of dysuria, pelvic organ prolapse, new onset vasomotor symptoms, and low mood.     1. Dysuria  - resolved with increased hydration. Encouraged ongoing hydration.  - discussed vaseline to vulva if discomfort is occurring in the external tissue rather than within the urethra.    2. Vasomotor symptoms  - we discussed menopause and vasomotor symptoms at length. We reviewed the average age of menopause is 51, and that the further one is from menopause the riskier HRT is in terms of stroke and VTE risk. I discouraged use of exogenous estrogen at the juncture as I think it is highly likely that s he is >10 years from onset of menopause. Timing is challenging due to hysterectomy at age 50, prior to menopause.   - I am also concerned that the onset of symptoms started right after gallbladder surgery and that she is reporting finding of an adrenal mass that has not had further evaluation. I recommended that she follow up with PCP and consider Endocrine evaluation for this finding.   - citalopram (CELEXA) 20 MG tablet; Take 1 tablet (20 mg) by mouth daily  Dispense: 30 tablet; Refill: 11    3. Adjustment disorder with depressed mood  - given mood component of concerns, and potential benefit for vasomotor symptoms, patient agrees to start citalopram. She is familiar with this medication as her  is on it. Medication info provided.   - citalopram (CELEXA) 20 MG tablet; Take 1 tablet (20 mg) by mouth daily  Dispense: 30 tablet; Refill: 11     4. Cystocele, midline  - mild, asymptomatic. No indication for surgical intervention at this time. She is advised that this is not dangerous, and that she can keep an eye on things and wait for any treatment until when or if she needs it. She knows she will need to come back in if  her symptoms change enough to be bothersome, if she develops any urinary retention or persistent leakage, if bowel movements are difficult and splinting is required to evacuate the rectum, or if she has any vaginal bleeding.    Follow up med start in 6 weeks.    Neeta Hernandez MD  Obstetrics and Gynecology   [facility]

## 2024-06-03 NOTE — NURSING NOTE
"Chief Complaint   Patient presents with    Consult     For fallen bladder, patient had bladder sling and partial hysterectomy in . No current urinary issues. No abnormal bleeding, does not feel any bulges or masses now, has in the past.     Menopausal Sx     For the past 5 months. Getting more noticeable. Is not taking any type of HRT.       Initial /80   Wt 77.6 kg (171 lb 1.6 oz)   BMI 30.70 kg/m   Estimated body mass index is 30.7 kg/m  as calculated from the following:    Height as of 3/12/24: 1.59 m (5' 2.6\").    Weight as of this encounter: 77.6 kg (171 lb 1.6 oz).  BP completed using cuff size: regular    Questioned patient about current smoking habits.  Pt. has never smoked.          The following HM Due: NONE    Dean Elliott CMA               "

## 2024-07-09 ENCOUNTER — OFFICE VISIT (OUTPATIENT)
Dept: DERMATOLOGY | Facility: CLINIC | Age: 69
End: 2024-07-09
Payer: COMMERCIAL

## 2024-07-09 DIAGNOSIS — L71.9 ROSACEA: ICD-10-CM

## 2024-07-09 DIAGNOSIS — L82.1 SEBORRHEIC KERATOSIS: Primary | ICD-10-CM

## 2024-07-09 DIAGNOSIS — L91.8 SKIN TAG: ICD-10-CM

## 2024-07-09 DIAGNOSIS — D22.9 MULTIPLE NEVI: ICD-10-CM

## 2024-07-09 PROCEDURE — 99203 OFFICE O/P NEW LOW 30 MIN: CPT | Performed by: DERMATOLOGY

## 2024-07-09 NOTE — LETTER
7/9/2024      RE: Marivel Hernandez  [address]  [place] [address]     Dear Colleague,    Thank you for the opportunity to participate in the care of your patient, Marivel Hernandez, at the [facility] [place] at [facility]. Please see a copy of my visit note below.    Dermatology Clinic Note    Dermatology Problem List:  1. Seborrheic keratosis   2. Rosacea ETT subtype  3. Benign pigmented nevi   4. Skin tags      Assessment and Plan:  Seborrheic keratoses: Benign hyperkeratotic papules and plaques. No treatment advised unless irritation occurs.    2. Skin tags: Benign pedunculated papules, common in skin fold areas. No treatment advised unless bothersome or irritated. Insurance codes provided.   3. Rosacea, ETT subtype. Noted treatment with PDL would be most effective. Patient declines referral for now.   4. Benign pigmented nevi: No lesions of concern. Sun protection recommended. Discussed ABCDEs of malignant melanoma.      RTC in 2 years, sooner if requesting skin tag removal.     Thank you for involving me in this patient's care.     Katie Esparza MD   of Dermatology  [facility]    CC: Katie Bates MD  [address] [place] [address]    Deirdre E. Milligan, MD    ____________________________________________________________________________________________________________________________________________    CC: Patient presents with:  Consult: New pt, PCP Dr. Valentin current concerns are lesion on right forearm-growing bigger, redness of the nose hx Rosacea tx none, full body skin check       HPI: Marivel Hernandez is a 69 year old female presenting for skin check seen at the request of Milligan, Deirdre E.  Patient has no history of skin cancer.  She reports that she has a spot on her right arm that seems to be getting bigger and darker.  She does have a history of multiple peeling  sunburns.  She has used tanning beds previously.  She has no known family history of skin cancer.      Patient Active Problem List   Diagnosis    Migraine    Sciatica    CARDIOVASCULAR SCREENING; LDL GOAL LESS THAN 160    Class 1 obesity due to excess calories with serious comorbidity and body mass index (BMI) of 32.0 to 32.9 in adult    Osteopenia of spine    Benign essential hypertension    Lactic acidosis    Gall stone pancreatitis    Adrenal nodule on CT 8/2023 - nonemergent adrenal CT (H)       Allergies   Allergen Reactions    No Known Allergies          Current Outpatient Medications   Medication Sig Dispense Refill    citalopram (CELEXA) 20 MG tablet Take 1 tablet (20 mg) by mouth daily 30 tablet 11    lisinopril (ZESTRIL) 10 MG tablet Take 1 tablet (10 mg) by mouth daily 90 tablet 4    multivitamin w/minerals (THERA-VIT-M) tablet Take 1 tablet by mouth daily      acetaminophen (TYLENOL) 325 MG tablet Take 325-650 mg by mouth every 6 hours as needed for mild pain (Patient not taking: Reported on 7/9/2024)      hydrOXYzine HCl (ATARAX) 25 MG tablet Take 1 tablet (25 mg) by mouth 3 times daily as needed for anxiety (Patient not taking: Reported on 7/9/2024) 30 tablet 1    naproxen sodium (ANAPROX) 220 MG tablet Take 220 mg by mouth as needed for moderate pain (Patient not taking: Reported on 7/9/2024)       No current facility-administered medications for this visit.         EXAM:  There were no vitals taken for this visit.  GEN: Alert, no distress  SKIN: Full body exam excluding genitals  --R forearm with waxy brown papule  --Telangiectasias on the nose and mid cheeks  --Waxy papules on the back, under the breasts  --Few medium brown 2-3 mm macules on the arms, legs, back  --Fleshy papules x2 in the L axilla    Please do not hesitate to contact me if you have any questions/concerns.     Sincerely,       Katie Esparza MD

## 2024-07-09 NOTE — PATIENT INSTRUCTIONS
Please call insurance to determine coverage:    Skin tag codes:  Diagnosis: L91.8  Removal:  [id number]

## 2024-07-09 NOTE — PROGRESS NOTES
Dermatology Clinic Note    Dermatology Problem List:  1. Seborrheic keratosis   2. Rosacea ETT subtype  3. Benign pigmented nevi   4. Skin tags      Assessment and Plan:  Seborrheic keratoses: Benign hyperkeratotic papules and plaques. No treatment advised unless irritation occurs.    2. Skin tags: Benign pedunculated papules, common in skin fold areas. No treatment advised unless bothersome or irritated. Insurance codes provided.   3. Rosacea, ETT subtype. Noted treatment with PDL would be most effective. Patient declines referral for now.   4. Benign pigmented nevi: No lesions of concern. Sun protection recommended. Discussed ABCDEs of malignant melanoma.      RTC in 2 years, sooner if requesting skin tag removal.     Thank you for involving me in this patient's care.     Katie Esparza MD   of Dermatology  [facility]    CC: Katie Bates MD  [address] [place] [address]    Deirdre E. Milligan, MD    ____________________________________________________________________________________________________________________________________________    CC: Patient presents with:  Consult: New pt, PCP Dr. Valentin current concerns are lesion on right forearm-growing bigger, redness of the nose hx Rosacea tx none, full body skin check       HPI: Marivel Hernandez is a 69 year old female presenting for skin check seen at the request of Milligan, Deirdre E.  Patient has no history of skin cancer.  She reports that she has a spot on her right arm that seems to be getting bigger and darker.  She does have a history of multiple peeling sunburns.  She has used tanning beds previously.  She has no known family history of skin cancer.      Patient Active Problem List   Diagnosis    Migraine    Sciatica    CARDIOVASCULAR SCREENING; LDL GOAL LESS THAN 160    Class 1 obesity due to excess calories with serious comorbidity and body mass index (BMI) of 32.0 to 32.9 in adult     Osteopenia of spine    Benign essential hypertension    Lactic acidosis    Gall stone pancreatitis    Adrenal nodule on CT 8/2023 - nonemergent adrenal CT (H)       Allergies   Allergen Reactions    No Known Allergies          Current Outpatient Medications   Medication Sig Dispense Refill    citalopram (CELEXA) 20 MG tablet Take 1 tablet (20 mg) by mouth daily 30 tablet 11    lisinopril (ZESTRIL) 10 MG tablet Take 1 tablet (10 mg) by mouth daily 90 tablet 4    multivitamin w/minerals (THERA-VIT-M) tablet Take 1 tablet by mouth daily      acetaminophen (TYLENOL) 325 MG tablet Take 325-650 mg by mouth every 6 hours as needed for mild pain (Patient not taking: Reported on 7/9/2024)      hydrOXYzine HCl (ATARAX) 25 MG tablet Take 1 tablet (25 mg) by mouth 3 times daily as needed for anxiety (Patient not taking: Reported on 7/9/2024) 30 tablet 1    naproxen sodium (ANAPROX) 220 MG tablet Take 220 mg by mouth as needed for moderate pain (Patient not taking: Reported on 7/9/2024)       No current facility-administered medications for this visit.         EXAM:  There were no vitals taken for this visit.  GEN: Alert, no distress  SKIN: Full body exam excluding genitals  --R forearm with waxy brown papule  --Telangiectasias on the nose and mid cheeks  --Waxy papules on the back, under the breasts  --Few medium brown 2-3 mm macules on the arms, legs, back  --Fleshy papules x2 in the L axilla

## 2024-07-16 ENCOUNTER — ANCILLARY PROCEDURE (OUTPATIENT)
Dept: MAMMOGRAPHY | Facility: CLINIC | Age: 69
End: 2024-07-16
Payer: COMMERCIAL

## 2024-07-16 DIAGNOSIS — Z12.31 VISIT FOR SCREENING MAMMOGRAM: ICD-10-CM

## 2024-07-16 PROCEDURE — 77063 BREAST TOMOSYNTHESIS BI: CPT | Mod: TC | Performed by: RADIOLOGY

## 2024-07-16 PROCEDURE — 77067 SCR MAMMO BI INCL CAD: CPT | Mod: TC | Performed by: RADIOLOGY

## 2024-10-02 SDOH — HEALTH STABILITY: PHYSICAL HEALTH: ON AVERAGE, HOW MANY DAYS PER WEEK DO YOU ENGAGE IN MODERATE TO STRENUOUS EXERCISE (LIKE A BRISK WALK)?: 5 DAYS

## 2024-10-02 SDOH — HEALTH STABILITY: PHYSICAL HEALTH: ON AVERAGE, HOW MANY MINUTES DO YOU ENGAGE IN EXERCISE AT THIS LEVEL?: 50 MIN

## 2024-10-02 ASSESSMENT — SOCIAL DETERMINANTS OF HEALTH (SDOH): HOW OFTEN DO YOU GET TOGETHER WITH FRIENDS OR RELATIVES?: ONCE A WEEK

## 2024-10-07 ENCOUNTER — OFFICE VISIT (OUTPATIENT)
Dept: PEDIATRICS | Facility: CLINIC | Age: 69
End: 2024-10-07
Attending: STUDENT IN AN ORGANIZED HEALTH CARE EDUCATION/TRAINING PROGRAM
Payer: COMMERCIAL

## 2024-10-07 VITALS
DIASTOLIC BLOOD PRESSURE: 81 MMHG | WEIGHT: 164.2 LBS | TEMPERATURE: 97.1 F | HEIGHT: 63 IN | OXYGEN SATURATION: 98 % | HEART RATE: 82 BPM | RESPIRATION RATE: 16 BRPM | BODY MASS INDEX: 29.09 KG/M2 | SYSTOLIC BLOOD PRESSURE: 119 MMHG

## 2024-10-07 DIAGNOSIS — R79.9 ABNORMAL FINDING OF BLOOD CHEMISTRY, UNSPECIFIED: ICD-10-CM

## 2024-10-07 DIAGNOSIS — R23.2 HOT FLASHES: ICD-10-CM

## 2024-10-07 DIAGNOSIS — I10 BENIGN ESSENTIAL HYPERTENSION: ICD-10-CM

## 2024-10-07 DIAGNOSIS — E27.9 ADRENAL NODULE (H): ICD-10-CM

## 2024-10-07 DIAGNOSIS — R00.2 PALPITATIONS: ICD-10-CM

## 2024-10-07 DIAGNOSIS — Z00.00 ENCOUNTER FOR MEDICARE ANNUAL WELLNESS EXAM: Primary | ICD-10-CM

## 2024-10-07 LAB
ALBUMIN SERPL BCG-MCNC: 4.6 G/DL (ref 3.5–5.2)
ALP SERPL-CCNC: 104 U/L (ref 40–150)
ALT SERPL W P-5'-P-CCNC: 13 U/L (ref 0–50)
ANION GAP SERPL CALCULATED.3IONS-SCNC: 11 MMOL/L (ref 7–15)
AST SERPL W P-5'-P-CCNC: 19 U/L (ref 0–45)
BILIRUB SERPL-MCNC: 0.3 MG/DL
BUN SERPL-MCNC: 25.3 MG/DL (ref 8–23)
CALCIUM SERPL-MCNC: 10 MG/DL (ref 8.8–10.4)
CHLORIDE SERPL-SCNC: 105 MMOL/L (ref 98–107)
CREAT SERPL-MCNC: 1.18 MG/DL (ref 0.51–0.95)
CREAT UR-MCNC: 88.6 MG/DL
EGFRCR SERPLBLD CKD-EPI 2021: 50 ML/MIN/1.73M2
ERYTHROCYTE [DISTWIDTH] IN BLOOD BY AUTOMATED COUNT: 12.7 % (ref 10–15)
FERRITIN SERPL-MCNC: 206 NG/ML (ref 11–328)
GLUCOSE SERPL-MCNC: 93 MG/DL (ref 70–99)
HCO3 SERPL-SCNC: 26 MMOL/L (ref 22–29)
HCT VFR BLD AUTO: 38.4 % (ref 35–47)
HGB BLD-MCNC: 12.6 G/DL (ref 11.7–15.7)
MCH RBC QN AUTO: 31 PG (ref 26.5–33)
MCHC RBC AUTO-ENTMCNC: 32.8 G/DL (ref 31.5–36.5)
MCV RBC AUTO: 95 FL (ref 78–100)
MICROALBUMIN UR-MCNC: <12 MG/L
MICROALBUMIN/CREAT UR: NORMAL MG/G{CREAT}
PLATELET # BLD AUTO: 223 10E3/UL (ref 150–450)
POTASSIUM SERPL-SCNC: 4.7 MMOL/L (ref 3.4–5.3)
PROT SERPL-MCNC: 7.3 G/DL (ref 6.4–8.3)
RBC # BLD AUTO: 4.06 10E6/UL (ref 3.8–5.2)
SODIUM SERPL-SCNC: 142 MMOL/L (ref 135–145)
TSH SERPL DL<=0.005 MIU/L-ACNC: 2.96 UIU/ML (ref 0.3–4.2)
WBC # BLD AUTO: 5.4 10E3/UL (ref 4–11)

## 2024-10-07 PROCEDURE — 80053 COMPREHEN METABOLIC PANEL: CPT | Performed by: STUDENT IN AN ORGANIZED HEALTH CARE EDUCATION/TRAINING PROGRAM

## 2024-10-07 PROCEDURE — 84443 ASSAY THYROID STIM HORMONE: CPT | Mod: GZ | Performed by: STUDENT IN AN ORGANIZED HEALTH CARE EDUCATION/TRAINING PROGRAM

## 2024-10-07 PROCEDURE — 99214 OFFICE O/P EST MOD 30 MIN: CPT | Mod: 25 | Performed by: STUDENT IN AN ORGANIZED HEALTH CARE EDUCATION/TRAINING PROGRAM

## 2024-10-07 PROCEDURE — 82570 ASSAY OF URINE CREATININE: CPT | Performed by: STUDENT IN AN ORGANIZED HEALTH CARE EDUCATION/TRAINING PROGRAM

## 2024-10-07 PROCEDURE — 82728 ASSAY OF FERRITIN: CPT | Performed by: STUDENT IN AN ORGANIZED HEALTH CARE EDUCATION/TRAINING PROGRAM

## 2024-10-07 PROCEDURE — G0438 PPPS, INITIAL VISIT: HCPCS | Performed by: STUDENT IN AN ORGANIZED HEALTH CARE EDUCATION/TRAINING PROGRAM

## 2024-10-07 PROCEDURE — 36415 COLL VENOUS BLD VENIPUNCTURE: CPT | Performed by: STUDENT IN AN ORGANIZED HEALTH CARE EDUCATION/TRAINING PROGRAM

## 2024-10-07 PROCEDURE — 82043 UR ALBUMIN QUANTITATIVE: CPT | Performed by: STUDENT IN AN ORGANIZED HEALTH CARE EDUCATION/TRAINING PROGRAM

## 2024-10-07 PROCEDURE — 85027 COMPLETE CBC AUTOMATED: CPT | Performed by: STUDENT IN AN ORGANIZED HEALTH CARE EDUCATION/TRAINING PROGRAM

## 2024-10-07 NOTE — PROGRESS NOTES
Preventive Care Visit  [facility] Deirdre E. Milligan, MD, Internal Medicine - Pediatrics  Oct 7, 2024      Assessment & Plan     Encounter for Medicare annual wellness exam  - CBC with platelets; Future  - TSH with free T4 reflex; Future  - CBC with platelets  - TSH with free T4 reflex    Benign essential hypertension  Blood pressure well controlled on lisinopril. Continue.  - Albumin Random Urine Quantitative with Creat Ratio; Future  - Comprehensive metabolic panel (BMP + Alb, Alk Phos, ALT, AST, Total. Bili, TP); Future  - Albumin Random Urine Quantitative with Creat Ratio  - Comprehensive metabolic panel (BMP + Alb, Alk Phos, ALT, AST, Total. Bili, TP)    Adrenal nodule (H)  Patient with bilateral adrenal nodules and due for 1 year MRI follow up. In setting of episodic symptoms of palpitations, sweating, and elevated blood pressure recommend evaluation for pheo with urinary studies to start.  - Metanephrine random or 24 hr urine; Future  - Catecholamines fractioned free urine; Future  - MR Adrenal with Contrast; Future  - Metanephrine random or 24 hr urine  - Catecholamines fractioned free urine    Hot flashes  Likely >10 years from menopause and menopausal hormone therapy was previously not recommended. Other Differential diagnosis in setting of fatigue includes iron deficiency anemia, thyroid disease, pheo (see above). Recommend labs per below.  - Metanephrine random or 24 hr urine; Future  - Catecholamines fractioned free urine; Future  - Comprehensive metabolic panel (BMP + Alb, Alk Phos, ALT, AST, Total. Bili, TP); Future  - CBC with platelets; Future  - TSH with free T4 reflex; Future  - Ferritin; Future  - Metanephrine random or 24 hr urine  - Catecholamines fractioned free urine  - Comprehensive metabolic panel (BMP + Alb, Alk Phos, ALT, AST, Total. Bili, TP)  - CBC with platelets  - TSH with free T4 reflex  - Ferritin    Palpitations  See above.  - TSH with free T4 reflex;  "Future  - TSH with free T4 reflex    Abnormal finding of blood chemistry, unspecified  See above.  - Ferritin; Future  - Ferritin            BMI  Estimated body mass index is 29.46 kg/m  as calculated from the following:    Height as of this encounter: 1.59 m (5' 2.6\").    Weight as of this encounter: 74.5 kg (164 lb 3.2 oz).       Counseling  Appropriate preventive services were addressed with this patient via screening, questionnaire, or discussion as appropriate for fall prevention, nutrition, physical activity, Tobacco-use cessation, social engagement, weight loss and cognition.  Checklist reviewing preventive services available has been given to the patient.  Reviewed patient's diet, addressing concerns and/or questions.   She is at risk for psychosocial distress and has been provided with information to reduce risk.   Discussed possible causes of fatigue.         Fabricio Orta is a 69 year old, presenting for the following:  Physical        10/7/2024     9:40 AM   Additional Questions   Roomed by UT         10/7/2024     9:40 AM   Patient Reported Additional Medications   Patient reports taking the following new medications None         Health Care Directive  Patient does not have a Health Care Directive or Living Will: Discussed advance care planning with patient; however, patient declined at this time.    HPI    Increase in gas and nausea and bloating   -was trying to lose weight so eating more greens, beans, salads, high fiber   - having same issue      Fell on a walk and landed on left shoulder  Was getting better but can reinjure   -lifting worsens (abduction)   -works out M-F    Sometimes heart racing/pounding episodes  Hot flashes - sudden onset sweating, back of neck is wet  Feeling more tired during the day    Started taking celexa a few months ago  -taking it before bed      Taking a multivitamin      Was having anxiety about being passenger in car  -saw therapist  -started celexa (did " not help hot flashes) but now in front seat again                10/2/2024   General Health   How would you rate your overall physical health? Excellent   Feel stress (tense, anxious, or unable to sleep) Only a little      (!) STRESS CONCERN      10/2/2024   Nutrition   Diet: Regular (no restrictions)            10/2/2024   Exercise   Days per week of moderate/strenous exercise 5 days   Average minutes spent exercising at this level 50 min            10/2/2024   Social Factors   Frequency of gathering with friends or relatives Once a week   Worry food won't last until get money to buy more No   Food not last or not have enough money for food? No   Do you have housing? (Housing is defined as stable permanent housing and does not include staying ouside in a car, in a tent, in an abandoned building, in an overnight shelter, or couch-surfing.) Yes   Are you worried about losing your housing? No   Lack of transportation? No   Unable to get utilities (heat,electricity)? No            10/2/2024   Fall Risk   Fallen 2 or more times in the past year? No    No   Trouble with walking or balance? No    No       Multiple values from one day are sorted in reverse-chronological order          10/2/2024   Activities of Daily Living- Home Safety   Needs help with the following daily activites None of the above   Safety concerns in the home None of the above            10/2/2024   Dental   Dentist two times every year? Yes            10/2/2024   Hearing Screening   Hearing concerns? None of the above            10/2/2024   Driving Risk Screening   Patient/family members have concerns about driving No            10/2/2024   General Alertness/Fatigue Screening   Have you been more tired than usual lately? (!) YES            10/2/2024   Urinary Incontinence Screening   Bothered by leaking urine in past 6 months No            10/2/2024   TB Screening   Were you born outside of the [place]? No            Today's PHQ-2 Score:       10/7/2024      9:39 AM   PHQ-2 ( 1999 [employer])   Q1: Little interest or pleasure in doing things 0   Q2: Feeling down, depressed or hopeless 1   PHQ-2 Score 1   Q1: Little interest or pleasure in doing things Not at all   Q2: Feeling down, depressed or hopeless Several days   PHQ-2 Score 1           10/2/2024   Substance Use   Alcohol more than 3/day or more than 7/wk No   Do you have a current opioid prescription? No   How severe/bad is pain from 1 to 10? 0/10 (No Pain)   Do you use any other substances recreationally? No        Social History     Tobacco Use    Smoking status: Never     Passive exposure: Never    Smokeless tobacco: Never   Vaping Use    Vaping status: Never Used   Substance Use Topics    Alcohol use: Yes     Comment: occasional    Drug use: No           7/16/2024   LAST FHS-7 RESULTS   1st degree relative breast or ovarian cancer Yes   Any relative bilateral breast cancer No   Any male have breast cancer No   Any ONE woman have BOTH breast AND ovarian cancer No   Any woman with breast cancer before 50yrs Yes   2 or more relatives with breast AND/OR ovarian cancer No   2 or more relatives with breast AND/OR bowel cancer No                 History of abnormal Pap smear: Status post hysterectomy with removal of cervix and no history of CIN2 or greater or cervical cancer. Health Maintenance and Surgical History updated.       ASCVD Risk   The 10-year ASCVD risk score (Casandra DK, et al., 2019) is: 9.5%    Values used to calculate the score:      Age: 69 years      Sex: Female      Is Non- : No      Diabetic: No      Tobacco smoker: No      Systolic Blood Pressure: 119 mmHg      Is BP treated: Yes      HDL Cholesterol: 45 mg/dL      Total Cholesterol: 208 mg/dL            Reviewed and updated as needed this visit by Provider                      Current providers sharing in care for this patient include:  Patient Care Team:  Milligan, Deirdre E, MD as PCP - General (Internal Medicine -  "Pediatrics)  Katie Bates MD as MD (Internal Medicine - Pediatrics)  Katie Esparza MD as MD (Dermatology)  Kay Gee MD as MD (Otolaryngology)  Milligan, Deirdre E, MD as Assigned PCP  Neeta Hernandez MD as MD (OB/Gyn)  Dede Larson PA-C as Physician Assistant (Physician Assistant - Medical)  Neeta Hernandez MD as Assigned OBGYN Provider  Katie Esparza MD as Assigned Surgical Provider    The following health maintenance items are reviewed in Epic and correct as of today:  Health Maintenance   Topic Date Due    Pneumococcal Vaccine: 65+ Years (2 of 2 - PPSV23 or PCV20) 07/05/2022    MICROALBUMIN  08/24/2023    INFLUENZA VACCINE (1) Never done    COVID-19 Vaccine (4 - 2024-25 season) 09/01/2024    BMP  09/14/2024    ANNUAL REVIEW OF HM ORDERS  09/14/2024    MEDICARE ANNUAL WELLNESS VISIT  09/14/2024    MAMMO SCREENING  07/16/2025    FALL RISK ASSESSMENT  10/07/2025    GLUCOSE  09/14/2026    DTAP/TDAP/TD IMMUNIZATION (3 - Td or Tdap) 12/23/2026    COLORECTAL CANCER SCREENING  12/08/2027    LIPID  09/14/2028    ADVANCE CARE PLANNING  09/14/2028    RSV VACCINE (1 - 1-dose 75+ series) 06/17/2031    DEXA  10/19/2038    HEPATITIS C SCREENING  Completed    MIGRAINE ACTION PLAN  Completed    PHQ-2 (once per calendar year)  Completed    ZOSTER IMMUNIZATION  Completed    HPV IMMUNIZATION  Aged Out    MENINGITIS IMMUNIZATION  Aged Out    RSV MONOCLONAL ANTIBODY  Aged Out    PAP  Discontinued            Objective    Exam  /81 (BP Location: Right arm, Patient Position: Sitting, Cuff Size: Adult Large)   Pulse 82   Temp 97.1  F (36.2  C) (Temporal)   Resp 16   Ht 1.59 m (5' 2.6\")   Wt 74.5 kg (164 lb 3.2 oz)   SpO2 98%   BMI 29.46 kg/m     Estimated body mass index is 29.46 kg/m  as calculated from the following:    Height as of this encounter: 1.59 m (5' 2.6\").    Weight as of this encounter: 74.5 kg (164 lb 3.2 oz).    Physical Exam  GENERAL: alert and no " distress  EYES: Eyes grossly normal to inspection, and conjunctivae and sclerae normal  HENT: ear canals and TM's normal, nose and mouth without ulcers or lesions  NECK: no adenopathy, no asymmetry, masses, or scars  RESP: lungs clear to auscultation - no rales, rhonchi or wheezes  CV: regular rate and rhythm, normal S1 S2, no S3 or S4, no murmur, click or rub, no peripheral edema  ABDOMEN: soft, nontender, no hepatosplenomegaly, no masses  MS: no gross musculoskeletal defects noted, no edema  SKIN: no suspicious lesions or rashes  NEURO: Normal strength and tone, mentation intact and speech normal  PSYCH: mentation appears normal, affect normal/bright        10/7/2024   Mini Cog   Clock Draw Score 2 Normal   3 Item Recall 3 objects recalled   Mini Cog Total Score 5                   Signed Electronically by: Deirdre E. Milligan, MD

## 2024-10-07 NOTE — PATIENT INSTRUCTIONS
Rotator cuff tendinopathy exercises   -abduction  Can try Voltaren (diclofenac) gel on the shoulder    Can try Culturelle       Get the MRI adrenal in January  Radiology scheduling phone number: [phone] [facility] [place]      Can try to cut Celexa (citalopram) in half to 10mg dosing for 2-3 weeks and seeing how symptoms go      Patient Education   Preventive Care Advice   This is general advice given by our system to help you stay healthy. However, your care team may have specific advice just for you. Please talk to your care team about your preventive care needs.  Nutrition  Eat 5 or more servings of fruits and vegetables each day.  Try wheat bread, brown rice and whole grain pasta (instead of white bread, rice, and pasta).  Get enough calcium and vitamin D. Check the label on foods and aim for 100% of the RDA (recommended daily allowance).  Lifestyle  Exercise at least 150 minutes each week  (30 minutes a day, 5 days a week).  Do muscle strengthening activities 2 days a week. These help control your weight and prevent disease.  No smoking.  Wear sunscreen to prevent skin cancer.  Have a dental exam and cleaning every 6 months.  Yearly exams  See your health care team every year to talk about:  Any changes in your health.  Any medicines your care team has prescribed.  Preventive care, family planning, and ways to prevent chronic diseases.  Shots (vaccines)   HPV shots (up to age 26), if you've never had them before.  Hepatitis B shots (up to age 59), if you've never had them before.  COVID-19 shot: Get this shot when it's due.  Flu shot: Get a flu shot every year.  Tetanus shot: Get a tetanus shot every 10 years.  Pneumococcal, hepatitis A, and RSV shots: Ask your care team if you need these based on your risk.  Shingles shot (for age 50 and up)  General health tests  Diabetes screening:  Starting at age 35, Get screened for diabetes at least every 3 years.  If you are younger  than age 35, ask your care team if you should be screened for diabetes.  Cholesterol test: At age 39, start having a cholesterol test every 5 years, or more often if advised.  Bone density scan (DEXA): At age 50, ask your care team if you should have this scan for osteoporosis (brittle bones).  Hepatitis C: Get tested at least once in your life.  STIs (sexually transmitted infections)  Before age 24: Ask your care team if you should be screened for STIs.  After age 24: Get screened for STIs if you're at risk. You are at risk for STIs (including HIV) if:  You are sexually active with more than one person.  You don't use condoms every time.  You or a partner was diagnosed with a sexually transmitted infection.  If you are at risk for HIV, ask about PrEP medicine to prevent HIV.  Get tested for HIV at least once in your life, whether you are at risk for HIV or not.  Cancer screening tests  Cervical cancer screening: If you have a cervix, begin getting regular cervical cancer screening tests starting at age 21.  Breast cancer scan (mammogram): If you've ever had breasts, begin having regular mammograms starting at age 40. This is a scan to check for breast cancer.  Colon cancer screening: It is important to start screening for colon cancer at age 45.  Have a colonoscopy test every 10 years (or more often if you're at risk) Or, ask your provider about stool tests like a FIT test every year or Cologuard test every 3 years.  To learn more about your testing options, visit:   .  For help making a decision, visit:   https://bit.ly/vq60336.  Prostate cancer screening test: If you have a prostate, ask your care team if a prostate cancer screening test (PSA) at age 55 is right for you.  Lung cancer screening: If you are a current or former smoker ages 50 to 80, ask your care team if ongoing lung cancer screenings are right for you.  For informational purposes only. Not to replace the advice of your health care provider.  Copyright   2023 [facility]. All rights reserved. Clinically reviewed by the [facility] Transitions Program. [employer] 563857 - REV 01/24.  Learning About Sleeping Well  What does sleeping well mean?     Sleeping well means getting enough sleep to feel good and stay healthy. How much sleep is enough varies among people.  The number of hours you sleep and how you feel when you wake up are both important. If you do not feel refreshed, you probably need more sleep. Another sign of not getting enough sleep is feeling tired during the day.  Experts recommend that adults get at least 7 or more hours of sleep per day. Children and older adults need more sleep.  Why is getting enough sleep important?  Getting enough quality sleep is a basic part of good health. When your sleep suffers, your physical health, mood, and your thoughts can suffer too. You may find yourself feeling more grumpy or stressed. Not getting enough sleep also can lead to serious problems, including injury, accidents, anxiety, and depression.  What might cause poor sleeping?  Many things can cause sleep problems, including:  Changes to your sleep schedule.  Stress. Stress can be caused by fear about a single event, such as giving a speech. Or you may have ongoing stress, such as worry about work or school.  Depression, anxiety, and other mental or emotional conditions.  Changes in your sleep habits or surroundings. This includes changes that happen where you sleep, such as noise, light, or sleeping in a different bed. It also includes changes in your sleep pattern, such as having jet lag or working a late shift.  Health problems, such as pain, breathing problems, and restless legs syndrome.  Lack of regular exercise.  Using alcohol, nicotine, or caffeine before bed.  How can you help yourself?  Here are some tips that may help you sleep more soundly and wake up feeling more refreshed.  Your sleeping area   Use your bedroom only for  "sleeping and sex. A bit of light reading may help you fall asleep. But if it doesn't, do your reading elsewhere in the house. Try not to use your TV, computer, smartphone, or tablet while you are in bed.  Be sure your bed is big enough to stretch out comfortably, especially if you have a sleep partner.  Keep your bedroom quiet, dark, and cool. Use curtains, blinds, or a sleep mask to block out light. To block out noise, use earplugs, soothing music, or a \"white noise\" machine.  Your evening and bedtime routine   Create a relaxing bedtime routine. You might want to take a warm shower or bath, or listen to soothing music.  Go to bed at the same time every night. And get up at the same time every morning, even if you feel tired.  What to avoid   Limit caffeine (coffee, tea, caffeinated sodas) during the day, and don't have any for at least 6 hours before bedtime.  Avoid drinking alcohol before bedtime. Alcohol can cause you to wake up more often during the night.  Try not to smoke or use tobacco, especially in the evening. Nicotine can keep you awake.  Limit naps during the day, especially close to bedtime.  Avoid lying in bed awake for too long. If you can't fall asleep or if you wake up in the middle of the night and can't get back to sleep within about 20 minutes, get out of bed and go to another room until you feel sleepy.  Avoid taking medicine right before bed that may keep you awake or make you feel hyper or energized. Your doctor can tell you if your medicine may do this and if you can take it earlier in the day.  If you can't sleep   Imagine yourself in a peaceful, pleasant scene. Focus on the details and feelings of being in a place that is relaxing.  Get up and do a quiet or boring activity until you feel sleepy.  Avoid drinking any liquids before going to bed to help prevent waking up often to use the bathroom.  Where can you learn more?  Go to https://www.[employer].net/patiented  Enter J942 in the search " "box to learn more about \"Learning About Sleeping Well.\"  Current as of: July 10, 2023  Content Version: 14.2 2024 [facility] [employer], [place].   Care instructions adapted under license by your healthcare professional. If you have questions about a medical condition or this instruction, always ask your healthcare professional. [employer] disclaims any warranty or liability for your use of this information.       "

## 2024-10-10 PROCEDURE — 99000 SPECIMEN HANDLING OFFICE-LAB: CPT | Performed by: STUDENT IN AN ORGANIZED HEALTH CARE EDUCATION/TRAINING PROGRAM

## 2024-10-10 PROCEDURE — 82384 ASSAY THREE CATECHOLAMINES: CPT | Mod: 90 | Performed by: STUDENT IN AN ORGANIZED HEALTH CARE EDUCATION/TRAINING PROGRAM

## 2024-10-10 PROCEDURE — 83835 ASSAY OF METANEPHRINES: CPT | Mod: 90 | Performed by: STUDENT IN AN ORGANIZED HEALTH CARE EDUCATION/TRAINING PROGRAM

## 2024-10-13 LAB
CREAT 24H UR-MRATE: 1658 MG/D
CREAT UR-MCNC: 65 MG/DL
METANEPH 24H UR-MCNC: 26 UG/L
METANEPH 24H UR-MRATE: 66 UG/D
METANEPH+NORMETANEPH UR-IMP: ABNORMAL
METANEPH/CREAT 24H UR: 40 UG/G CRT
NORMETANEPHRINE 24H UR-MCNC: 148 UG/L
NORMETANEPHRINE 24H UR-MRATE: 377 UG/D
NORMETANEPHRINE/CREAT 24H UR: 228 UG/G CRT

## 2024-10-16 LAB
CATECHOLS UR-IMP: ABNORMAL
CREAT 24H UR-MRATE: 1658 MG/D
CREAT UR-MCNC: 65 MG/DL
DOPAMINE 24H UR-MRATE: 56 UG/D
DOPAMINE UR-MCNC: 22 UG/L
DOPAMINE/CREAT UR: 34 UG/G CRT
EPINEPH 24H UR-MRATE: <3 UG/D
EPINEPH UR-MCNC: <1 UG/L
EPINEPH/CREAT UR: <2 UG/G CRT
NOREPINEPH 24H UR-MRATE: 23 UG/D
NOREPINEPH UR-MCNC: 9 UG/L
NOREPINEPH/CREAT UR: 14 UG/G CRT

## 2024-10-23 ENCOUNTER — LAB (OUTPATIENT)
Dept: LAB | Facility: CLINIC | Age: 69
End: 2024-10-23
Attending: STUDENT IN AN ORGANIZED HEALTH CARE EDUCATION/TRAINING PROGRAM
Payer: COMMERCIAL

## 2024-10-23 DIAGNOSIS — K76.0 FATTY LIVER: ICD-10-CM

## 2024-10-23 DIAGNOSIS — R79.89 ELEVATED SERUM CREATININE: Primary | ICD-10-CM

## 2024-10-23 DIAGNOSIS — I10 BENIGN ESSENTIAL HYPERTENSION: ICD-10-CM

## 2024-10-23 LAB
CREAT SERPL-MCNC: 1.28 MG/DL (ref 0.51–0.95)
EGFRCR SERPLBLD CKD-EPI 2021: 45 ML/MIN/1.73M2

## 2024-10-23 PROCEDURE — 36415 COLL VENOUS BLD VENIPUNCTURE: CPT

## 2024-10-23 PROCEDURE — 82610 CYSTATIN C: CPT

## 2024-10-23 PROCEDURE — 82565 ASSAY OF CREATININE: CPT

## 2024-10-24 LAB
CYSTATIN C (ROCHE): 1.4 MG/L (ref 0.6–1)
GFR/BSA.PRED SERPLBLD CYS-BASED-ARV: 45 ML/MIN/1.73M2

## 2024-10-29 ENCOUNTER — TRANSFERRED RECORDS (OUTPATIENT)
Dept: HEALTH INFORMATION MANAGEMENT | Facility: CLINIC | Age: 69
End: 2024-10-29
Payer: COMMERCIAL

## 2024-11-04 ENCOUNTER — PATIENT OUTREACH (OUTPATIENT)
Dept: CARE COORDINATION | Facility: CLINIC | Age: 69
End: 2024-11-04
Payer: COMMERCIAL

## 2024-11-06 ENCOUNTER — HOSPITAL ENCOUNTER (OUTPATIENT)
Dept: ULTRASOUND IMAGING | Facility: CLINIC | Age: 69
Discharge: HOME | End: 2024-11-06
Attending: STUDENT IN AN ORGANIZED HEALTH CARE EDUCATION/TRAINING PROGRAM | Admitting: STUDENT IN AN ORGANIZED HEALTH CARE EDUCATION/TRAINING PROGRAM
Payer: COMMERCIAL

## 2024-11-06 DIAGNOSIS — I10 BENIGN ESSENTIAL HYPERTENSION: ICD-10-CM

## 2024-11-06 PROCEDURE — 76770 US EXAM ABDO BACK WALL COMP: CPT

## 2024-11-26 ENCOUNTER — HOSPITAL ENCOUNTER (OUTPATIENT)
Dept: ULTRASOUND IMAGING | Facility: CLINIC | Age: 69
Discharge: HOME | End: 2024-11-26
Attending: STUDENT IN AN ORGANIZED HEALTH CARE EDUCATION/TRAINING PROGRAM
Payer: COMMERCIAL

## 2024-11-26 DIAGNOSIS — K76.0 FATTY LIVER: ICD-10-CM

## 2024-11-26 PROCEDURE — 76705 ECHO EXAM OF ABDOMEN: CPT

## 2024-12-04 ENCOUNTER — TELEPHONE (OUTPATIENT)
Dept: PEDIATRICS | Facility: CLINIC | Age: 69
End: 2024-12-04
Payer: COMMERCIAL

## 2024-12-04 NOTE — TELEPHONE ENCOUNTER
Joseph with [employer] calling to relay information regarding patient MRI. [facility] abdominal MRI was ordered related to liver diagnosis CPT 10590 by Dr. Milligan on 11/29/24. [facility] this was not approved by insurance as per the patient's plan advanced imaging is limited to one in a 12 month period for a 1-2 cm nodule. [facility] the patient had an MRI of abdomen done on 1/11/24.      [facility] this decision was reviewed by [facility] physicians reviewer on behalf of the patient's [facility] plan and requires additional information to be approved. [facility] ordering provider can call [phone] to discuss with a physician reviewer.    [facility] case closes 12/9/24 so needs to be addressed prior in order to complete the MRI. [facility] can also request to speak to Rn to give more information at [phone]    Dr. Milligan please review and advise.    Anju Dwyer RN

## 2024-12-06 ENCOUNTER — MYC MEDICAL ADVICE (OUTPATIENT)
Dept: PEDIATRICS | Facility: CLINIC | Age: 69
End: 2024-12-06
Payer: COMMERCIAL

## 2024-12-06 NOTE — TELEPHONE ENCOUNTER
Called [phone] and spoke with RN at [facility].    [facility] document was uploaded and reviewed and imaging got approved after additional information was provided on 12/2/24. [facility] authorization was approved on 12/5/24. States at the time of the call below, more information was needed, but at this time there is no more information needed and the patient can continue with the MRI.    For patient information if needed:  Order # [id number] valid 11/29/24 to 12/26/25      Called and left voice message for patient to call [phone] and ask to speak to a nurse. Also sent [employer].    Upon call back please  -relay approval of MRI    Anju Dwyer RN

## 2024-12-06 NOTE — TELEPHONE ENCOUNTER
Can get it done in January.    Deirdre Milligan, MD  Internal Medicine & Pediatrics  [facility] [place]  She/her

## 2024-12-09 NOTE — TELEPHONE ENCOUNTER
See the other encounter for details.     Joseph HERNÁNDEZ  Clinic RN  [facility]

## 2024-12-09 NOTE — TELEPHONE ENCOUNTER
Pt read the  message that we sent & has no questions.    Joseph HERNÁNDEZ  Clinic RN  [facility]

## 2024-12-16 ENCOUNTER — TRANSCRIBE ORDERS (OUTPATIENT)
Dept: OTHER | Age: 69
End: 2024-12-16

## 2024-12-16 DIAGNOSIS — Z47.89 AFTERCARE FOLLOWING SURGERY OF THE MUSCULOSKELETAL SYSTEM: ICD-10-CM

## 2024-12-16 DIAGNOSIS — S52.271D CLOSED MONTEGGIA'S FRACTURE OF RIGHT ULNA WITH ROUTINE HEALING, SUBSEQUENT ENCOUNTER: Primary | ICD-10-CM

## 2024-12-17 DIAGNOSIS — I10 BENIGN ESSENTIAL HYPERTENSION: ICD-10-CM

## 2024-12-18 RX ORDER — LISINOPRIL 10 MG/1
10 TABLET ORAL DAILY
Qty: 90 TABLET | Refills: 4 | Status: SHIPPED | OUTPATIENT
Start: 2024-12-18

## 2024-12-20 ENCOUNTER — HOSPITAL ENCOUNTER (OUTPATIENT)
Dept: MRI IMAGING | Facility: CLINIC | Age: 69
Discharge: HOME | End: 2024-12-20
Attending: STUDENT IN AN ORGANIZED HEALTH CARE EDUCATION/TRAINING PROGRAM | Admitting: STUDENT IN AN ORGANIZED HEALTH CARE EDUCATION/TRAINING PROGRAM
Payer: COMMERCIAL

## 2024-12-20 DIAGNOSIS — K76.89 LIVER NODULE: ICD-10-CM

## 2024-12-20 DIAGNOSIS — K76.0 FATTY LIVER: ICD-10-CM

## 2024-12-20 PROCEDURE — 255N000002 HC RX 255 OP 636: Performed by: STUDENT IN AN ORGANIZED HEALTH CARE EDUCATION/TRAINING PROGRAM

## 2024-12-20 PROCEDURE — 74183 MRI ABD W/O CNTR FLWD CNTR: CPT

## 2024-12-20 PROCEDURE — A9585 GADOBUTROL INJECTION: HCPCS | Performed by: STUDENT IN AN ORGANIZED HEALTH CARE EDUCATION/TRAINING PROGRAM

## 2024-12-20 RX ORDER — GADOBUTROL 604.72 MG/ML
7.5 INJECTION INTRAVENOUS ONCE
Status: COMPLETED | OUTPATIENT
Start: 2024-12-20 | End: 2024-12-20

## 2024-12-20 RX ADMIN — GADOBUTROL 7.5 ML: 604.72 INJECTION INTRAVENOUS at 12:35

## 2024-12-30 ENCOUNTER — THERAPY VISIT (OUTPATIENT)
Dept: OCCUPATIONAL THERAPY | Facility: CLINIC | Age: 69
End: 2024-12-30
Attending: ORTHOPAEDIC SURGERY
Payer: COMMERCIAL

## 2024-12-30 DIAGNOSIS — S52.271D CLOSED MONTEGGIA'S FRACTURE OF RIGHT ULNA WITH ROUTINE HEALING, SUBSEQUENT ENCOUNTER: Primary | ICD-10-CM

## 2024-12-30 DIAGNOSIS — Z47.89 AFTERCARE FOLLOWING SURGERY OF THE MUSCULOSKELETAL SYSTEM: ICD-10-CM

## 2024-12-30 DIAGNOSIS — M25.621 STIFFNESS OF ELBOW JOINT, RIGHT: ICD-10-CM

## 2024-12-30 PROCEDURE — 97110 THERAPEUTIC EXERCISES: CPT | Mod: GO | Performed by: OCCUPATIONAL THERAPIST

## 2024-12-30 PROCEDURE — 97165 OT EVAL LOW COMPLEX 30 MIN: CPT | Mod: GO | Performed by: OCCUPATIONAL THERAPIST

## 2024-12-30 PROCEDURE — 97530 THERAPEUTIC ACTIVITIES: CPT | Mod: GO | Performed by: OCCUPATIONAL THERAPIST

## 2024-12-30 NOTE — PROGRESS NOTES
OCCUPATIONAL THERAPY EVALUATION  Type of Visit: Evaluation        Fall Risk Screen:  Fall screen completed by: OT  Have you fallen 2 or more times in the past year?: Yes  Have you fallen and had an injury in the past year?: Yes  Is patient a fall risk?: Yes    Subjective        Presenting condition or subjective complaint:    Date of onset: 10/30/24 (12/16/2024 order date)    Relevant medical history: (Patient-Rptd) High blood pressure; Migraines or headaches; Overweight   Past Medical History:   Diagnosis Date    Hypertension     Migraine, unspecified, without mention of intractable migraine without mention of status migrainosus     Sciatica     MRI 1999; L4L5 mild disc protrusion    Ulcerative colitis (H)    Dates & types of surgery:    Past Surgical History:   Procedure Laterality Date    ABDOMEN SURGERY  8/21/23    COLONOSCOPY  2007    HYSTERECTOMY, AMY  01/01/2006    LAPAROSCOPIC CHOLECYSTECTOMY WITH CHOLANGIOGRAMS N/A 08/21/2023    Procedure: CHOLECYSTECTOMY, LAPAROSCOPIC, WITH CHOLANGIOGRAM;  Surgeon: Kavon Duckworth MD;  Location:  OR    [facility] NONSPECIFIC PROCEDURE  01/01/1994    Tubal Ligation     Prior diagnostic imaging/testing results: (Patient-Rptd) X-ray     Prior therapy history for the same diagnosis, illness or injury: (Patient-Rptd) No      Prior Level of Function  Transfers:   Ambulation:   ADL:   IADL:     Living Environment  Social support: (Patient-Rptd) With a significant other or spouse   Type of home: (Patient-Rptd) Apartment/condo   Stairs to enter the home: (Patient-Rptd) No       Ramp: (Patient-Rptd) No   Stairs inside the home: (Patient-Rptd) No       Help at home: (Patient-Rptd) Home management tasks (cooking, cleaning); Assist for driving and community activities  Equipment owned:       Employment: (Patient-Rptd) No    Hobbies/Interests:      Patient goals for therapy: (Patient-Rptd) touch my right shoulder with my right hand  Lifting items    Closed Monteggia's fracture of right  ulna with routine healing, subsequent encounter   Aftercare following surgery of the musculoskeletal system     Pain assessment:      Objective   ADDITIONAL HISTORY:  Left hand dominant  Patient reports symptoms of pain and stiffness/loss of motion  Transportation: Assistance  Currently not working   -Notes a sciatica-like sensation, feels like a bruised rib, on L side. Knee still hurts from the fall, pain with palpation but otherwise doesn't feel it.     Functional Outcome Measure:   Upper Extremity Functional Index Score:  SCORE:   Column Totals: /80: (Patient-Rptd) 65   (A lower score indicates greater disability.)    Pain Level (Scale 0-10):   12/30/2024   At Rest 0/10   With Use 5/10     Pain Description:  Date 12/30/2024   Location elbow   Pain Quality Aching   Frequency intermittent     Pain is worst  daytime   Exacerbated by  Lifting, overdoing it, exercises, odd position   Relieved by rest   Progression Gradually getting better.      EDEMA: Mild     SCAR/WOUND: Sensitivity: mildly sensitive, sensitive when resting against a hard surface  Quality: Mild to moderate adherence    SENSATION: WNL throughout all nerve distributions; per patient report     ROM  Pain Report: - none  + mild    ++ moderate    +++ severe   Elbow 12/30/2024 12/30/2024   AROM (PROM) L R   Extension -15 -43 pre  -28 post   Flexion 146 118   Supination 69 61   Pronation 89 81     ROM  Pain Report: - none  + mild    ++ moderate    +++ severe   Wrist 12/30/2024 12/30/2024   AROM (PROM) L R   Extension 76 65   Flexion 55 59   RD 24 21   UD 31 31     STRENGTH: Contraindicated    PALPATION:   Elbow Palpation    Spiral Groove    Distal Triceps    Anconeus    ECRB Origin    ECU at Origin    EDC at Origin    Radial Head    Posterior Interosseous Nerve (PIN)    Extensor Wad    Distal Bicep Tendon    Medial Epicondyle +   Flexor Origin +   Flexor Wad +   Pronator Teres +     Assessment & Plan   CLINICAL IMPRESSIONS  Medical Diagnosis: Closed  Cotyia's fracture of right ulna with routine healing, subsequent encounter  Aftercare following surgery of the musculoskeletal system    Treatment Diagnosis: R elbow stiffness    Impression/Assessment: Pt is a 69 year old female presenting to Occupational Therapy due to ground-level fall at the gym per chart review.  The following significant findings have been identified: Impaired activity tolerance, Impaired ROM, Impaired strength, Pain, and Post-surgical precautions.  These identified deficits interfere with their ability to perform self care tasks, recreational activities, household chores, household mobility, and meal planning and preparation as compared to previous level of function.     Clinical Decision Making (Complexity):  Assessment of Occupational Performance: 5 or more Performance Deficits  Occupational Performance Limitations: bathing/showering, toileting, dressing, feeding, functional mobility, hygiene and grooming, health management and maintenance, home establishment and management, meal preparation and cleanup, shopping, and leisure activities  Clinical Decision Making (Complexity): Low complexity    PLAN OF CARE  Treatment Interventions:  Modalities:  Hot Packs  Therapeutic Exercise:  AROM, PROM, Isotonics, Isometrics, and Stabilization  Neuromuscular re-education:  Proprioceptive Training, Posture, Kinesiotaping, Isometrics, and Stabilization  Manual Techniques:  Joint mobilization, Scar mobilization, Friction massage, Myofascial release, and Manual edema mobilization  Orthotic Fabrication:  Static progressive  Self Care:  Self Care Tasks and Ergonomic Considerations    Long Term Goals   OT Goal 1  Goal Identifier: Household IADLs - gripping  Goal Description: Open a tight or new jar with mild to no difficulty  Rationale: In order to maximize safety and independence with performance of self-care activities  Goal Progress: 1lb weight lifting precautions - unable to currently attempt  Target  Date: 03/24/25  OT Goal 2  Goal Identifier: Household IADLs - carrying a shopping bag  Goal Description: No difficulty carrying a shopping bag equal to or greater than 10lb  Rationale: In order to maximize safety and independence with performance of self-care activities  Goal Progress: 1lb weight lifting precautions - unable to currently attempt  Target Date: 02/24/25  OT Goal 3  Goal Identifier: ADL - feeding  Goal Description: 130 degrees elbow flexion needed to feed self  Rationale: In order to maximize safety and independence with performance of self-care activities  Goal Progress: 118 flexion upon eval  Target Date: 02/24/25      Frequency of Treatment: 1x/week  Duration of Treatment: 4 weeks, tapering to biweekly for 8 weeks     Recommended Referrals to Other Professionals:   Education Assessment: Learner/Method: Patient;Demonstration  Education Comments: PTRX printed     Risks and benefits of evaluation/treatment have been explained.   Patient/Family/caregiver agrees with Plan of Care.     Evaluation Time:    OT Eval, Low Complexity Minutes (22017): 24       Signing Clinician: Dalila Navarro OT        [facility]                                                                                   OUTPATIENT OCCUPATIONAL THERAPY      PLAN OF TREATMENT FOR OUTPATIENT REHABILITATION   Patient's Last Name, First Name, Marivel Lilly YOB: 1955   Provider's Name   [facility]   Medical Record No.  [id number]     Onset Date: 10/30/24 (12/16/2024 order date) Start of Care Date: 12/30/24     Medical Diagnosis:  Closed Monteggia's fracture of right ulna with routine healing, subsequent encounter  Aftercare following surgery of the musculoskeletal system      OT Treatment Diagnosis:  R elbow stiffness Plan of Treatment  Frequency/Duration:1x/week/4 weeks, tapering to biweekly for 8 weeks    Certification date from 12/30/24   To 02/24/25         See note for plan of treatment details and functional goals     Dalila Navarro OT                         I CERTIFY THE NEED FOR THESE SERVICES FURNISHED UNDER        THIS PLAN OF TREATMENT AND WHILE UNDER MY CARE .             Physician Signature               Date    X_____________________________________________________                  Referring Provider:  Juan Snyder    Initial Assessment  See Epic Evaluation- 12/30/24

## 2025-01-06 ENCOUNTER — THERAPY VISIT (OUTPATIENT)
Dept: OCCUPATIONAL THERAPY | Facility: CLINIC | Age: 70
End: 2025-01-06
Attending: ORTHOPAEDIC SURGERY
Payer: COMMERCIAL

## 2025-01-06 DIAGNOSIS — M25.621 STIFFNESS OF ELBOW JOINT, RIGHT: ICD-10-CM

## 2025-01-06 DIAGNOSIS — Z47.89 AFTERCARE FOLLOWING SURGERY OF THE MUSCULOSKELETAL SYSTEM: ICD-10-CM

## 2025-01-06 DIAGNOSIS — S52.271D CLOSED MONTEGGIA'S FRACTURE OF RIGHT ULNA WITH ROUTINE HEALING, SUBSEQUENT ENCOUNTER: Primary | ICD-10-CM

## 2025-01-06 PROCEDURE — 97140 MANUAL THERAPY 1/> REGIONS: CPT | Mod: GO | Performed by: OCCUPATIONAL THERAPIST

## 2025-01-06 PROCEDURE — 97110 THERAPEUTIC EXERCISES: CPT | Mod: GO | Performed by: OCCUPATIONAL THERAPIST

## 2025-01-06 PROCEDURE — 97010 HOT OR COLD PACKS THERAPY: CPT | Mod: GO | Performed by: OCCUPATIONAL THERAPIST

## 2025-01-13 ENCOUNTER — THERAPY VISIT (OUTPATIENT)
Dept: OCCUPATIONAL THERAPY | Facility: CLINIC | Age: 70
End: 2025-01-13
Attending: ORTHOPAEDIC SURGERY
Payer: COMMERCIAL

## 2025-01-13 DIAGNOSIS — S52.271D CLOSED MONTEGGIA'S FRACTURE OF RIGHT ULNA WITH ROUTINE HEALING, SUBSEQUENT ENCOUNTER: Primary | ICD-10-CM

## 2025-01-13 DIAGNOSIS — M25.621 STIFFNESS OF ELBOW JOINT, RIGHT: ICD-10-CM

## 2025-01-13 DIAGNOSIS — Z47.89 AFTERCARE FOLLOWING SURGERY OF THE MUSCULOSKELETAL SYSTEM: ICD-10-CM

## 2025-01-13 PROCEDURE — 97110 THERAPEUTIC EXERCISES: CPT | Mod: GO | Performed by: OCCUPATIONAL THERAPIST

## 2025-01-14 ENCOUNTER — TRANSCRIBE ORDERS (OUTPATIENT)
Dept: OTHER | Age: 70
End: 2025-01-14

## 2025-01-14 DIAGNOSIS — M54.32 SCIATICA OF LEFT SIDE: Primary | ICD-10-CM

## 2025-01-24 PROBLEM — M54.42 CHRONIC BILATERAL LOW BACK PAIN WITH LEFT-SIDED SCIATICA: Status: ACTIVE | Noted: 2025-01-24

## 2025-01-24 PROBLEM — G89.29 CHRONIC BILATERAL LOW BACK PAIN WITH LEFT-SIDED SCIATICA: Status: ACTIVE | Noted: 2025-01-24

## 2025-01-27 ENCOUNTER — THERAPY VISIT (OUTPATIENT)
Dept: OCCUPATIONAL THERAPY | Facility: CLINIC | Age: 70
End: 2025-01-27
Payer: COMMERCIAL

## 2025-01-27 DIAGNOSIS — S52.271D CLOSED MONTEGGIA'S FRACTURE OF RIGHT ULNA WITH ROUTINE HEALING, SUBSEQUENT ENCOUNTER: Primary | ICD-10-CM

## 2025-01-27 DIAGNOSIS — M25.621 STIFFNESS OF ELBOW JOINT, RIGHT: ICD-10-CM

## 2025-01-27 DIAGNOSIS — Z47.89 AFTERCARE FOLLOWING SURGERY OF THE MUSCULOSKELETAL SYSTEM: ICD-10-CM

## 2025-01-27 PROCEDURE — 97110 THERAPEUTIC EXERCISES: CPT | Mod: GO | Performed by: OCCUPATIONAL THERAPIST

## 2025-01-30 ENCOUNTER — THERAPY VISIT (OUTPATIENT)
Dept: PHYSICAL THERAPY | Facility: CLINIC | Age: 70
End: 2025-01-30
Payer: COMMERCIAL

## 2025-01-30 DIAGNOSIS — M54.42 CHRONIC BILATERAL LOW BACK PAIN WITH LEFT-SIDED SCIATICA: Primary | ICD-10-CM

## 2025-01-30 DIAGNOSIS — G89.29 CHRONIC BILATERAL LOW BACK PAIN WITH LEFT-SIDED SCIATICA: Primary | ICD-10-CM

## 2025-02-07 ENCOUNTER — HOSPITAL ENCOUNTER (OUTPATIENT)
Dept: ULTRASOUND IMAGING | Facility: CLINIC | Age: 70
Discharge: HOME | End: 2025-02-07
Attending: STUDENT IN AN ORGANIZED HEALTH CARE EDUCATION/TRAINING PROGRAM | Admitting: STUDENT IN AN ORGANIZED HEALTH CARE EDUCATION/TRAINING PROGRAM
Payer: COMMERCIAL

## 2025-02-07 DIAGNOSIS — I10 BENIGN ESSENTIAL HYPERTENSION: ICD-10-CM

## 2025-02-07 PROCEDURE — 76770 US EXAM ABDO BACK WALL COMP: CPT

## 2025-02-17 ENCOUNTER — THERAPY VISIT (OUTPATIENT)
Dept: OCCUPATIONAL THERAPY | Facility: CLINIC | Age: 70
End: 2025-02-17
Payer: COMMERCIAL

## 2025-02-17 DIAGNOSIS — S52.271D CLOSED MONTEGGIA'S FRACTURE OF RIGHT ULNA WITH ROUTINE HEALING, SUBSEQUENT ENCOUNTER: Primary | ICD-10-CM

## 2025-02-17 DIAGNOSIS — M25.621 STIFFNESS OF ELBOW JOINT, RIGHT: ICD-10-CM

## 2025-02-17 DIAGNOSIS — Z47.89 AFTERCARE FOLLOWING SURGERY OF THE MUSCULOSKELETAL SYSTEM: ICD-10-CM

## 2025-02-17 PROCEDURE — 97010 HOT OR COLD PACKS THERAPY: CPT | Mod: GO | Performed by: OCCUPATIONAL THERAPIST

## 2025-02-17 PROCEDURE — 97760 ORTHOTIC MGMT&TRAING 1ST ENC: CPT | Mod: GO | Performed by: OCCUPATIONAL THERAPIST

## 2025-02-20 ENCOUNTER — LAB (OUTPATIENT)
Dept: LAB | Facility: CLINIC | Age: 70
End: 2025-02-20
Attending: STUDENT IN AN ORGANIZED HEALTH CARE EDUCATION/TRAINING PROGRAM
Payer: COMMERCIAL

## 2025-02-20 DIAGNOSIS — I10 BENIGN ESSENTIAL HYPERTENSION: ICD-10-CM

## 2025-02-20 LAB
ALBUMIN UR-MCNC: NEGATIVE MG/DL
APPEARANCE UR: CLEAR
BILIRUB UR QL STRIP: NEGATIVE
COLOR UR AUTO: YELLOW
CREAT SERPL-MCNC: 1.1 MG/DL (ref 0.51–0.95)
CREAT UR-MCNC: 58.4 MG/DL
EGFRCR SERPLBLD CKD-EPI 2021: 54 ML/MIN/1.73M2
GLUCOSE UR STRIP-MCNC: NEGATIVE MG/DL
HGB UR QL STRIP: NEGATIVE
KETONES UR STRIP-MCNC: NEGATIVE MG/DL
LEUKOCYTE ESTERASE UR QL STRIP: NEGATIVE
MICROALBUMIN UR-MCNC: <12 MG/L
MICROALBUMIN/CREAT UR: NORMAL MG/G{CREAT}
NITRATE UR QL: NEGATIVE
PH UR STRIP: 5.5 [PH] (ref 5–7)
RBC #/AREA URNS AUTO: NORMAL /HPF
SP GR UR STRIP: 1.01 (ref 1–1.03)
UROBILINOGEN UR STRIP-ACNC: 0.2 E.U./DL
WBC #/AREA URNS AUTO: NORMAL /HPF

## 2025-03-06 ENCOUNTER — THERAPY VISIT (OUTPATIENT)
Dept: PHYSICAL THERAPY | Facility: CLINIC | Age: 70
End: 2025-03-06
Payer: COMMERCIAL

## 2025-03-06 DIAGNOSIS — M54.42 CHRONIC BILATERAL LOW BACK PAIN WITH LEFT-SIDED SCIATICA: Primary | ICD-10-CM

## 2025-03-06 DIAGNOSIS — G89.29 CHRONIC BILATERAL LOW BACK PAIN WITH LEFT-SIDED SCIATICA: Primary | ICD-10-CM

## 2025-03-10 ENCOUNTER — THERAPY VISIT (OUTPATIENT)
Dept: OCCUPATIONAL THERAPY | Facility: CLINIC | Age: 70
End: 2025-03-10
Payer: COMMERCIAL

## 2025-03-10 DIAGNOSIS — Z47.89 AFTERCARE FOLLOWING SURGERY OF THE MUSCULOSKELETAL SYSTEM: ICD-10-CM

## 2025-03-10 DIAGNOSIS — M25.621 STIFFNESS OF ELBOW JOINT, RIGHT: ICD-10-CM

## 2025-03-10 DIAGNOSIS — S52.271D CLOSED MONTEGGIA'S FRACTURE OF RIGHT ULNA WITH ROUTINE HEALING, SUBSEQUENT ENCOUNTER: Primary | ICD-10-CM

## 2025-03-10 PROCEDURE — 97110 THERAPEUTIC EXERCISES: CPT | Mod: GO | Performed by: OCCUPATIONAL THERAPIST

## 2025-03-10 NOTE — PROGRESS NOTES
03/10/25 0500   Appointment Info   Treating Provider Dalila Navarro, OTR/L, CHT   Total/Authorized Visits 8 (POC)   Visits Used 6   Medical Diagnosis Closed Monteggia's fracture of right ulna with routine healing, subsequent encounter  Aftercare following surgery of the musculoskeletal system   OT Tx Diagnosis R elbow stiffness   Precautions/Limitations Next: PN/re-cert   Other pertinent information R radial head arthroplasty, ORIF R coronoid base fx, transfer radial head fragment in coronoid tip (reconstruct type 2 coronoid fx)   Progress Note/Certification   Start Of Care Date 12/30/24   Onset of Illness/Injury or Date of Surgery 10/30/24  (12/16/2024 order date)   Therapy Frequency 1x/month   Predicted Duration 2 months   Certification date from 03/10/25   Certification date to 05/10/25   Progress Note Due Date 04/10/25   Progress Note Completed Date 03/10/25   Goals   OT Goals 2;3   OT Goal 1   Goal Identifier Household IADLs - gripping   Goal Description Open a tight or new jar with mild to no difficulty; revised 3/10/2025 to no difficulty (4 UEFI score)   Rationale In order to maximize safety and independence with performance of self-care activities   Goal Progress Mild difficulty (3 UEFI score) - goal met. Advanced goal to no difficulty   Target Date 03/24/25   OT Goal 2   Goal Identifier Household IADLs - carrying a shopping bag   Goal Description No difficulty carrying a shopping bag equal to or greater than 10lb   Rationale In order to maximize safety and independence with performance of self-care activities   Goal Progress No difficulty (4 UEFI score) - goal met   Target Date 02/24/25   Date Met 03/10/25   OT Goal 3   Goal Identifier ADL - feeding   Goal Description 130 degrees elbow flexion needed to feed self   Rationale In order to maximize safety and independence with performance of self-care activities   Goal Progress 130 degrees of flexion. Goal met   Target Date 02/24/25   Date Met 03/10/25  "  Subjective Report   Subjective Report \"Good\"   Objective Measures   Objective Measures Hand Obj Measures;Objective Measure 1   Hand Objective Measures ROM   ROM Elbow ROM   Objective Measure 1   Objective Measure Pain report   Details In elbow joint, dull, shoulder pain becomes worse with donning/doffing coat   Elbow ROM    Extension 34   Flexion 130   Treatment Interventions (OT)   Interventions Therapeutic Activity;Therapeutic Procedure/Exercise;Manual Therapy;Orthotics   Therapeutic Procedure/Exercise   Therapeutic Procedure: strength, endurance, ROM, flexibillity minutes (71624) 25   PTRx Ther Proc 1 Elbow Passive Range of Motion Flexion   PTRx Ther Proc 1 - Details HEP continue. Noted elbow feels tight at times. -You can also stretch against a wall.   PTRx Ther Proc 2 Elbow Strengthening Extension with Theraband   PTRx Ther Proc 2 - Details 15 reps in clinic cueing: -squeeze your shoulder blades together while completing this exercise   PTRx Ther Proc 3 Elbow Strengthening Flexion with Theraband   PTRx Ther Proc 3 - Details 15 reps in clinic cueing for form/accuracy working in supination 3-4x/week every other day   PTRx Ther Proc 4 Shoulder Theraband External Rotation   PTRx Ther Proc 4 - Details 3 sets 10 reps 3-4x/week or every other day. Added to HEP   PTRx Ther Proc 5 Scapular Retraction/Depression   PTRx Ther Proc 5 - Details 15 reps 3-4x/week or every other day added to HEP.   Skilled Intervention Advancement of HEP to isotonics, shoulder ER, to reduce   Patient Response/Progress Good   Therapeutic Activity   Therapeutic Activity Minutes (46282) 3   PTRx Ther Act 1 Education Sheet General   PTRx Ther Act 1 - Details -Wear the splint for 20-30 minutes 1x/day taking it off sooner or loosening if your hand goes numb. If it is still numb bring in your splint so we can adjust it.-If bending your elbow feels really tight apply a heating pad and gently massage the triceps starting from your elbow down toward " your shoulder. This is usually easier lying down. You can use the flat side of a butter knife though this can be uncomfortable   Skilled Intervention Discussed wearing schedule for orthosis, strategies to complete triceps massage at home   Orthotics   HAND Splinting Long arm   Long Arm Splint Static Progressive   Desciption Volar   Wear Schedule Day   Comments R UE   Skilled Intervention Continue wearing static progressive orthosis for 30mins 1x/day. Able to achieve 22 degrees of extension previous visit in orthosis   Patient Response/Progress Good   Education   Learner/Method Patient;Demonstration   Education Comments PTRX printed   Plan   Home program see flowsheet/PTRX   Updates to plan of care HEP advancement   Plan for next session Adjust static progressive extension orthosis   Total Session Time   Timed Code Treatment Minutes 28   Total Treatment Time (sum of timed and untimed services) 28   Upper Extremity Functional Index Score:  SCORE:   Column Totals: /80: 72   (A lower score indicates greater disability.)    [facility]                                                                                   OUTPATIENT OCCUPATIONAL THERAPY    PLAN OF TREATMENT FOR OUTPATIENT REHABILITATION   Patient's Last Name, First Name, Marivel Lilly YOB: 1955   Provider's Name   [facility]   Medical Record No.  [id number]     Onset Date: 10/30/24 (12/16/2024 order date) Start of Care Date: 12/30/24     Medical Diagnosis:  Closed Monteggia's fracture of right ulna with routine healing, subsequent encounter  Aftercare following surgery of the musculoskeletal system      OT Treatment Diagnosis:  R elbow stiffness Plan of Treatment  Frequency/Duration:1x/month/2 months    Certification date from 03/10/25   To 05/10/25        See note for plan of treatment details and functional goals     Dalila Navarro, OT                         I CERTIFY THE  NEED FOR THESE SERVICES FURNISHED UNDER        THIS PLAN OF TREATMENT AND WHILE UNDER MY CARE .             Physician Signature               Date    X_____________________________________________________                  Referring Provider:  Juan Snyder    Initial Assessment  See Epic Evaluation- 12/30/24          PLAN  Continue therapy per current plan of care.  Weaned to 1x/month, return for orthosis adjustments PRN. Otherwise, manage independently at home with isotonic HEP.    Beginning/End Dates of Progress Note Reporting Period:  12/30/2024 to 03/10/2025    Referring Provider:  Juan Snyder

## 2025-03-20 ENCOUNTER — OFFICE VISIT (OUTPATIENT)
Dept: PEDIATRICS | Facility: CLINIC | Age: 70
End: 2025-03-20
Payer: COMMERCIAL

## 2025-03-20 VITALS
DIASTOLIC BLOOD PRESSURE: 79 MMHG | WEIGHT: 159.5 LBS | HEART RATE: 67 BPM | BODY MASS INDEX: 27.23 KG/M2 | HEIGHT: 64 IN | TEMPERATURE: 98.3 F | OXYGEN SATURATION: 97 % | SYSTOLIC BLOOD PRESSURE: 118 MMHG | RESPIRATION RATE: 16 BRPM

## 2025-03-20 DIAGNOSIS — R42 LIGHTHEADEDNESS: ICD-10-CM

## 2025-03-20 DIAGNOSIS — K13.70 MOUTH LESION: Primary | ICD-10-CM

## 2025-03-20 PROCEDURE — 1126F AMNT PAIN NOTED NONE PRSNT: CPT

## 2025-03-20 PROCEDURE — 3078F DIAST BP <80 MM HG: CPT

## 2025-03-20 PROCEDURE — 3074F SYST BP LT 130 MM HG: CPT

## 2025-03-20 PROCEDURE — 99213 OFFICE O/P EST LOW 20 MIN: CPT | Mod: GE

## 2025-03-20 ASSESSMENT — PAIN SCALES - GENERAL: PAINLEVEL_OUTOF10: NO PAIN (0)

## 2025-03-20 NOTE — PROGRESS NOTES
"  Assessment & Plan     Marivel is a 70yo F presenting for evaluation of mouth lesion, lightheadedness.    1. Mouth lesion (Primary)  Small papule on R tonsillar pillar. Given it has not changed in size, non-painful, not actively draining any fluid generally low suspicion for something insidious. She had prior cyst that required drainage by ENT, but this is not large enough at this time. Encouraged her to continue to monitor--should it start to change is size, became painful or start draining fluid, will have her see ENT.     2. Lightheadedness  Given sx have only shown up since recent gastroenteritis, suspect some aspect of dehydration and orthostasis playing a role. No headaches, vision changes, hearing changes, vertigo, chest pain, diaphoresis, shortness of breath that would be concerning for new underlying neuro or CV disorder. Encouraged oral rehydration and recommended that she follow up if symptoms not improved with hydration.       BMI  Estimated body mass index is 27.81 kg/m  as calculated from the following:    Height as of this encounter: 1.613 m (5' 3.5\").    Weight as of this encounter: 72.3 kg (159 lb 8 oz).       Subjective   Marivel is a 69 year old, presenting for the following health issues:  Dizziness        3/20/2025     2:05 PM   Additional Questions   Roomed by Shania AGUIRRE   Accompanied by Self         3/20/2025     2:05 PM   Patient Reported Additional Medications   Patient reports taking the following new medications NA     History of Present Illness       Reason for visit:  Light headed and white spots on throat  Symptom onset:  3-7 days ago  Symptoms include:  Dizziness, bumps on throat, ear starting to hurt, drainage when tipping head back.  Symptom intensity:  Moderate  Symptom progression:  Staying the same  Had these symptoms before:  No  Prior treatment description:  NA  What makes it worse:  Morning  What makes it better:  Nothing    She eats 0-1 servings of fruits and vegetables " "daily.She consumes 0 sweetened beverage(s) daily.She exercises with enough effort to increase her heart rate 20 to 29 minutes per day.  She exercises with enough effort to increase her heart rate 5 days per week.   She is taking medications regularly.        Bumps in back of throat--noticed them a couple weeks ago. She was looking at her teeth in the mirror and noticed some back of her throat. There was only one when she first looked, now there are 2. No active drainage from bumps, no uncomfortable. No sore throat.     Had norovirus over the past weekend. Since recovering from norovirus, she has noticed she gets lightheaded when getting out of bed in the morning and when bending over. No vertigo. No hearing changes, blurry visions. Denies heart palpitations, shortness of breath.         Objective    /79 (BP Location: Right arm, Patient Position: Sitting, Cuff Size: Adult Regular)   Pulse 67   Temp 98.3  F (36.8  C) (Oral)   Resp 16   Ht 1.613 m (5' 3.5\")   Wt 72.3 kg (159 lb 8 oz)   SpO2 97%   BMI 27.81 kg/m    Body mass index is 27.81 kg/m .  Physical Exam   GENERAL: alert and no distress  HENT: ear canals and TM's normal, small ~2mm papule on R tonsillar pillar  RESP: no respiratory distress  MS: no gross musculoskeletal defects noted, no edema  SKIN: no suspicious lesions or rashes  NEURO: Normal strength and tone, mentation intact and speech normal          Signed Electronically by: Lucy Mccoy MD  {Email feedback regarding this note to [email or web address]   :[id number]}  "

## 2025-04-29 PROBLEM — G89.29 CHRONIC BILATERAL LOW BACK PAIN WITH LEFT-SIDED SCIATICA: Status: RESOLVED | Noted: 2025-01-24 | Resolved: 2025-04-29

## 2025-04-29 PROBLEM — M54.42 CHRONIC BILATERAL LOW BACK PAIN WITH LEFT-SIDED SCIATICA: Status: RESOLVED | Noted: 2025-01-24 | Resolved: 2025-04-29

## 2025-05-08 ENCOUNTER — OFFICE VISIT (OUTPATIENT)
Dept: PEDIATRICS | Facility: CLINIC | Age: 70
End: 2025-05-08
Payer: COMMERCIAL

## 2025-05-08 VITALS
OXYGEN SATURATION: 97 % | SYSTOLIC BLOOD PRESSURE: 128 MMHG | HEIGHT: 64 IN | BODY MASS INDEX: 27.66 KG/M2 | TEMPERATURE: 97.5 F | RESPIRATION RATE: 14 BRPM | DIASTOLIC BLOOD PRESSURE: 81 MMHG | HEART RATE: 68 BPM | WEIGHT: 162 LBS

## 2025-05-08 DIAGNOSIS — R42 DIZZINESS: ICD-10-CM

## 2025-05-08 DIAGNOSIS — R53.83 FATIGUE, UNSPECIFIED TYPE: ICD-10-CM

## 2025-05-08 DIAGNOSIS — J35.9 LESION OF TONSIL: Primary | ICD-10-CM

## 2025-05-08 LAB
ALBUMIN SERPL BCG-MCNC: 4.4 G/DL (ref 3.5–5.2)
ALP SERPL-CCNC: 103 U/L (ref 40–150)
ALT SERPL W P-5'-P-CCNC: 10 U/L (ref 0–50)
ANION GAP SERPL CALCULATED.3IONS-SCNC: 11 MMOL/L (ref 7–15)
AST SERPL W P-5'-P-CCNC: 20 U/L (ref 0–45)
BASOPHILS # BLD AUTO: 0 10E3/UL (ref 0–0.2)
BASOPHILS NFR BLD AUTO: 0 %
BILIRUB SERPL-MCNC: 0.2 MG/DL
BUN SERPL-MCNC: 23.8 MG/DL (ref 8–23)
CALCIUM SERPL-MCNC: 9.8 MG/DL (ref 8.8–10.4)
CHLORIDE SERPL-SCNC: 106 MMOL/L (ref 98–107)
CREAT SERPL-MCNC: 1.02 MG/DL (ref 0.51–0.95)
EGFRCR SERPLBLD CKD-EPI 2021: 60 ML/MIN/1.73M2
EOSINOPHIL # BLD AUTO: 0.1 10E3/UL (ref 0–0.7)
EOSINOPHIL NFR BLD AUTO: 1 %
ERYTHROCYTE [DISTWIDTH] IN BLOOD BY AUTOMATED COUNT: 13 % (ref 10–15)
GLUCOSE SERPL-MCNC: 89 MG/DL (ref 70–99)
HCO3 SERPL-SCNC: 23 MMOL/L (ref 22–29)
HCT VFR BLD AUTO: 37.6 % (ref 35–47)
HGB BLD-MCNC: 12.3 G/DL (ref 11.7–15.7)
IMM GRANULOCYTES # BLD: 0 10E3/UL
IMM GRANULOCYTES NFR BLD: 0 %
LYMPHOCYTES # BLD AUTO: 1.6 10E3/UL (ref 0.8–5.3)
LYMPHOCYTES NFR BLD AUTO: 26 %
MCH RBC QN AUTO: 30.4 PG (ref 26.5–33)
MCHC RBC AUTO-ENTMCNC: 32.7 G/DL (ref 31.5–36.5)
MCV RBC AUTO: 93 FL (ref 78–100)
MONOCYTES # BLD AUTO: 0.4 10E3/UL (ref 0–1.3)
MONOCYTES NFR BLD AUTO: 7 %
NEUTROPHILS # BLD AUTO: 4 10E3/UL (ref 1.6–8.3)
NEUTROPHILS NFR BLD AUTO: 66 %
PLATELET # BLD AUTO: 203 10E3/UL (ref 150–450)
POTASSIUM SERPL-SCNC: 4.6 MMOL/L (ref 3.4–5.3)
PROT SERPL-MCNC: 7 G/DL (ref 6.4–8.3)
RBC # BLD AUTO: 4.04 10E6/UL (ref 3.8–5.2)
SODIUM SERPL-SCNC: 140 MMOL/L (ref 135–145)
TSH SERPL DL<=0.005 MIU/L-ACNC: 2.37 UIU/ML (ref 0.3–4.2)
WBC # BLD AUTO: 6 10E3/UL (ref 4–11)

## 2025-05-08 NOTE — PROGRESS NOTES
Assessment & Plan     Lesion of tonsil    - Adult ENT  Referral; Future    Dizziness    - TSH with free T4 reflex; Future  - CBC with platelets and differential; Future  - Comprehensive metabolic panel (BMP + Alb, Alk Phos, ALT, AST, Total. Bili, TP); Future  - EKG 12-lead complete w/read - Clinics  - ZIO PATCH MAIL OUT; Future  - Physical Therapy  Referral; Future  - TSH with free T4 reflex  - CBC with platelets and differential  - Comprehensive metabolic panel (BMP + Alb, Alk Phos, ALT, AST, Total. Bili, TP)    Fatigue, unspecified type    - TSH with free T4 reflex; Future  - CBC with platelets and differential; Future  - Comprehensive metabolic panel (BMP + Alb, Alk Phos, ALT, AST, Total. Bili, TP); Future  - EKG 12-lead complete w/read - Clinics  - ZIO PATCH MAIL OUT; Future  - TSH with free T4 reflex  - CBC with platelets and differential  - Comprehensive metabolic panel (BMP + Alb, Alk Phos, ALT, AST, Total. Bili, TP)      Patient was seen today for a tonsillar lesion and dizziness. She does have a tonsillar lesion noted on the right side tonsil that is small, round and whit-serenity in color.  There is no surrounding edema or erythema and the Uvula is midline.  Her airway is open and non-obstructed.  I did give her a referral to ENT for followup to address this lesion and consider removal.  For her other symptoms, the dizziness specifically, her EKG and orthostatic BPs are unremarkable today.  Will have labs and Zio patch done for further evaluation.  Patient is not having chest pains or shortness of breath, and parts of her dizziness is positional changes and may be more vertigo in nature.  I have given her a referral to the vestibular clinic for treatment of the dizziness, she will consider this option.  Patient was advised to maintain close followup, and seek more emergent followup if symptoms change or worsen in any way.  Patient understands and agrees with the plan today.    "      BMI  Estimated body mass index is 28.25 kg/m  as calculated from the following:    Height as of this encounter: 1.613 m (5' 3.5\").    Weight as of this encounter: 73.5 kg (162 lb).       Fabricio Orta is a 69 year old, presenting for the following health issues:  Consult (Spot on back of throat and light headed. Seen a month ago and spot has gotten bigger. )      5/8/2025     8:47 AM   Additional Questions   Roomed by Edyta Romo   Accompanied by n/a         5/8/2025     8:47 AM   Patient Reported Additional Medications   Patient reports taking the following new medications No     History of Present Illness       Reason for visit:  Light headed/balance and spot in the back of throat    She eats 0-1 servings of fruits and vegetables daily.She consumes 0 sweetened beverage(s) daily.She exercises with enough effort to increase her heart rate 20 to 29 minutes per day.  She exercises with enough effort to increase her heart rate 5 days per week.   She is taking medications regularly.        Patient has a spot in the back of her throat that has somewhat changed color and has gotten bigger.  She states that it does not hurt, but she feels like it needs to be gone.  She does not feel like this is in the way of her breathing or swallowing.  Since she has gotten this lesion, she has felt SO tired.   She is also still having symptoms of mild dizziness.  She says she mostly notices it when changing position or getting up out of bed, but she has also felt unsteady walking through a restaurant the other night.  She states that she is not having chest pains or shortness of breath, but she took a fall this past fall season and she feels like she aged 10 years.  She is feeling like an old lady now and she didn't before.         Review of Systems  Constitutional, neuro, ENT, endocrine, pulmonary, cardiac, gastrointestinal, genitourinary, musculoskeletal, integument and psychiatric systems are negative, except as otherwise " "noted.      Objective    /81 (BP Location: Right arm, Patient Position: Sitting, Cuff Size: Adult Regular)   Pulse 68   Temp 97.5  F (36.4  C) (Temporal)   Resp 14   Ht 1.613 m (5' 3.5\")   Wt 73.5 kg (162 lb)   SpO2 97%   BMI 28.25 kg/m    Body mass index is 28.25 kg/m .  Physical Exam   GENERAL: alert and no distress  EYES: Eyes grossly normal to inspection, PERRL and conjunctivae and sclerae normal  HENT: ear canals and TM's normal, nose and mouth without ulcers or lesions.  Right side tonsil with small, round white appearing lesion that \"hides\" behind tonsillar pilar.  No surrounding edema or erythema.  Uvula is midline and airway is patent without obstruction.    NECK: no adenopathy, no asymmetry, masses, or scars  RESP: lungs clear to auscultation - no rales, rhonchi or wheezes  CV: regular rate and rhythm, normal S1 S2, no S3 or S4, no murmur, click or rub, no peripheral edema  ABDOMEN: soft, nontender, no hepatosplenomegaly, no masses and bowel sounds normal  MS: no gross musculoskeletal defects noted, no edema          Signed Electronically by: Alis Quintanilla PA-C    "

## 2025-05-09 ENCOUNTER — RESULTS FOLLOW-UP (OUTPATIENT)
Dept: PEDIATRICS | Facility: CLINIC | Age: 70
End: 2025-05-09

## 2025-05-09 ENCOUNTER — ORDERS ONLY (AUTO-RELEASED) (OUTPATIENT)
Dept: PEDIATRICS | Facility: CLINIC | Age: 70
End: 2025-05-09
Payer: COMMERCIAL

## 2025-05-09 DIAGNOSIS — R42 DIZZINESS: ICD-10-CM

## 2025-05-09 DIAGNOSIS — R53.83 FATIGUE, UNSPECIFIED TYPE: ICD-10-CM

## 2025-05-09 NOTE — RESULT ENCOUNTER NOTE
Pancho Orta ,    The results from your recent lab work are within normal limits and overall stable to labs in the past.      Thank you for choosing [facility] for your health care needs,      Alis Quintanilla PA-C

## 2025-06-04 LAB — CV ZIO PRELIM RESULTS: NORMAL

## 2025-07-18 ENCOUNTER — ANCILLARY PROCEDURE (OUTPATIENT)
Dept: MAMMOGRAPHY | Facility: CLINIC | Age: 70
End: 2025-07-18
Attending: STUDENT IN AN ORGANIZED HEALTH CARE EDUCATION/TRAINING PROGRAM
Payer: COMMERCIAL

## 2025-07-18 DIAGNOSIS — Z12.31 VISIT FOR SCREENING MAMMOGRAM: ICD-10-CM

## 2025-07-18 PROCEDURE — 77063 BREAST TOMOSYNTHESIS BI: CPT | Mod: TC | Performed by: RADIOLOGY

## 2025-07-18 PROCEDURE — 77067 SCR MAMMO BI INCL CAD: CPT | Mod: TC | Performed by: RADIOLOGY

## (undated) DEVICE — NDL INSUFFLATION 150MM VERRES

## (undated) DEVICE — BLADE KNIFE SURG 11 371111

## (undated) DEVICE — ESU GROUND PAD ADULT W/CORD E7507

## (undated) DEVICE — ENDO TROCAR FIRST ENTRY KII FIOS Z-THRD 11X100MM CTF33

## (undated) DEVICE — RAD RX ISOVUE 300 (50ML) 61% IOPAMIDOL CHARGE PER ML

## (undated) DEVICE — CATH IV ANGIO INTRO 12GA 382277

## (undated) DEVICE — MANIFOLD NEPTUNE 4 PORT 700-20

## (undated) DEVICE — ENDO TROCAR FIRST ENTRY KII FIOS Z-THRD 05X100MM CTF03

## (undated) DEVICE — DRAPE C-ARM 60X42" 1013

## (undated) DEVICE — LINEN FULL SHEET 5511

## (undated) DEVICE — ESU CORD MONOPOLAR 10'  E0510

## (undated) DEVICE — SU DERMABOND ADVANCED .7ML DNX12

## (undated) DEVICE — GLOVE BIOGEL PI MICRO INDICATOR UNDERGLOVE SZ 7.5 48975

## (undated) DEVICE — SYR 30ML LL W/O NDL 302832

## (undated) DEVICE — Device

## (undated) DEVICE — CLIP APPLIER ENDO 5MM M/L LIGAMAX EL5ML

## (undated) DEVICE — ENDO POUCH UNIV RETRIEVAL SYSTEM INZII 10MM CD001

## (undated) DEVICE — SU VICRYL 4-0 PS-2 18" UND J496H

## (undated) DEVICE — DRAPE LEGGINGS 8421

## (undated) DEVICE — LINEN HALF SHEET 5512

## (undated) DEVICE — LINEN TOWEL PACK X5 5464

## (undated) DEVICE — SOL NACL 0.9% INJ 250ML BAG 2B1322Q

## (undated) DEVICE — CATH CHOLANGIOGRAM 4.5FR TAUT METAL TIP 20018-M55

## (undated) DEVICE — SU VICRYL 0 CT-2 CR 8X18" J727D

## (undated) DEVICE — PREP CHLORAPREP 26ML TINTED HI-LITE ORANGE 930815

## (undated) DEVICE — SU VICRYL 0 UR-6 27" J603H

## (undated) DEVICE — ESU ELEC BLADE 2.75" COATED/INSULATED E1455

## (undated) DEVICE — SOL NACL 0.9% IRRIG 1000ML BOTTLE 2F7124

## (undated) DEVICE — DECANTER BAG 2002S

## (undated) DEVICE — CONNECTOR STOPCOCK 3 WAY MALE LL HI-FLO MX9311L

## (undated) DEVICE — LINEN POUCH DBL 5427

## (undated) DEVICE — TUBING IV EXTENSION SET ANESTHESIA 34" MLL 2C6227

## (undated) DEVICE — ENDO TROCAR SLEEVE KII Z-THREADED 05X100MM CTS02

## (undated) DEVICE — BAG CLEAR TRASH 1.3M 39X33" P4040C

## (undated) RX ORDER — FENTANYL CITRATE 50 UG/ML
INJECTION, SOLUTION INTRAMUSCULAR; INTRAVENOUS
Status: DISPENSED
Start: 2023-08-21

## (undated) RX ORDER — DEXAMETHASONE SODIUM PHOSPHATE 4 MG/ML
INJECTION, SOLUTION INTRA-ARTICULAR; INTRALESIONAL; INTRAMUSCULAR; INTRAVENOUS; SOFT TISSUE
Status: DISPENSED
Start: 2023-08-21

## (undated) RX ORDER — ACETAMINOPHEN 325 MG/1
TABLET ORAL
Status: DISPENSED
Start: 2023-08-21

## (undated) RX ORDER — CEFAZOLIN SODIUM/WATER 2 G/20 ML
SYRINGE (ML) INTRAVENOUS
Status: DISPENSED
Start: 2023-08-21

## (undated) RX ORDER — BUPIVACAINE HYDROCHLORIDE AND EPINEPHRINE 2.5; 5 MG/ML; UG/ML
INJECTION, SOLUTION EPIDURAL; INFILTRATION; INTRACAUDAL; PERINEURAL
Status: DISPENSED
Start: 2023-08-21

## (undated) RX ORDER — PHENYLEPHRINE HYDROCHLORIDE 10 MG/ML
INJECTION INTRAVENOUS
Status: DISPENSED
Start: 2023-08-21

## (undated) RX ORDER — INDOCYANINE GREEN AND WATER 25 MG
KIT INJECTION
Status: DISPENSED
Start: 2023-08-21

## (undated) RX ORDER — FENTANYL CITRATE-0.9 % NACL/PF 10 MCG/ML
PLASTIC BAG, INJECTION (ML) INTRAVENOUS
Status: DISPENSED
Start: 2023-08-21